# Patient Record
Sex: FEMALE | Race: WHITE | NOT HISPANIC OR LATINO | Employment: OTHER | URBAN - METROPOLITAN AREA
[De-identification: names, ages, dates, MRNs, and addresses within clinical notes are randomized per-mention and may not be internally consistent; named-entity substitution may affect disease eponyms.]

---

## 2018-03-13 LAB — INR PPP: 1.2 (ref 0.9–1.1)

## 2019-01-25 LAB — HEP C HIM: NORMAL

## 2019-03-14 LAB
CHOLEST SERPL-MCNC: 150 MG/DL
HBA1C MFR BLD: 4.8 % (ref 4–6)
HDLC SERPL-MCNC: 71 MG/DL (ref 23–92)
LDLC SERPL CALC-MCNC: 66 MG/DL
NONHDLC SERPL-MCNC: 79 MG/DL
TRIGL SERPL-MCNC: 65 MG/DL

## 2019-04-15 ENCOUNTER — TRANSFERRED RECORDS (OUTPATIENT)
Dept: HEALTH INFORMATION MANAGEMENT | Facility: CLINIC | Age: 44
End: 2019-04-15

## 2019-06-14 ENCOUNTER — TRANSFERRED RECORDS (OUTPATIENT)
Dept: HEALTH INFORMATION MANAGEMENT | Facility: CLINIC | Age: 44
End: 2019-06-14

## 2019-06-28 ENCOUNTER — TRANSFERRED RECORDS (OUTPATIENT)
Dept: HEALTH INFORMATION MANAGEMENT | Facility: CLINIC | Age: 44
End: 2019-06-28

## 2019-08-13 ENCOUNTER — TRANSFERRED RECORDS (OUTPATIENT)
Dept: HEALTH INFORMATION MANAGEMENT | Facility: CLINIC | Age: 44
End: 2019-08-13

## 2019-08-23 ENCOUNTER — MEDICAL CORRESPONDENCE (OUTPATIENT)
Dept: HEALTH INFORMATION MANAGEMENT | Facility: CLINIC | Age: 44
End: 2019-08-23

## 2019-08-30 NOTE — TELEPHONE ENCOUNTER
MEDICAL RECORDS REQUEST   [facility]  Urology Clinic  [address] [place] [address]  PHONE: [phone]  Fax: [phone]        FUTURE VISIT INFORMATION                                                   Marsha Mcneill YOB: 1975 scheduled for future visit at [facility]    APPOINTMENT INFORMATION:    Date: 19 12PM     Provider:  1975    Reason for Visit/Diagnosis: NGB     REFERRAL INFORMATION:    Referring provider:  Cruztio Pardo     Specialty: MD    Referring providers clinic: [facility] contact number:  [phone]    RECORDS REQUESTED FOR VISIT                                                     NOTES  STATUS/DETAILS   OFFICE NOTE from referring provider  yes   OFFICE NOTE from other specialist  mo   DISCHARGE SUMMARY from hospital  no   DISCHARGE REPORT from the ER  no   OPERATIVE REPORT  yes   MEDICATION LIST  yes   NEUROGENIC BLADDER      CT ABDOMEN/PELVIS (IMAGES & REPORT)  yes   CYSTOGRAM (IMAGES & REPORT)  yes   IVP (IMAGES & REPORT)  no   KUB (IMAGES & REPORT)  no   LABS  yes   RENAL/BLADDER ULTRASOUND (IMAGES & REPORT)  Yes      PRE-VISIT CHECKLIST      Record collection complete Yes- [facility] recs scanned in epic   Images in  PACS    If no, please explain: Called and LM for [facility] to fax over recs -   Amy called from [facility], will fax over orders.    Appointment appropriately scheduled           (right time/right provider) Yes   MyChart activation If no, please explain: In process    Questionnaire complete If no, please explain: In process      Completed by: Carie Barcenas

## 2019-09-11 ENCOUNTER — TRANSFERRED RECORDS (OUTPATIENT)
Dept: HEALTH INFORMATION MANAGEMENT | Facility: CLINIC | Age: 44
End: 2019-09-11

## 2019-09-12 ENCOUNTER — TRANSFERRED RECORDS (OUTPATIENT)
Dept: HEALTH INFORMATION MANAGEMENT | Facility: CLINIC | Age: 44
End: 2019-09-12

## 2019-09-18 ENCOUNTER — TRANSFERRED RECORDS (OUTPATIENT)
Dept: HEALTH INFORMATION MANAGEMENT | Facility: CLINIC | Age: 44
End: 2019-09-18

## 2019-09-24 ENCOUNTER — PRE VISIT (OUTPATIENT)
Dept: UROLOGY | Facility: CLINIC | Age: 44
End: 2019-09-24

## 2019-09-24 DIAGNOSIS — N31.9 NEUROGENIC BLADDER: Primary | ICD-10-CM

## 2019-09-24 DIAGNOSIS — G35 MS (MULTIPLE SCLEROSIS) (H): ICD-10-CM

## 2019-09-24 NOTE — TELEPHONE ENCOUNTER
Reason for visit: Neurogenic bladder     Relevant information: history of MS    Records/imaging/labs/orders: labs available, imaging orders placed    Pt called: message routed to scheduling team    At Rooming: regular

## 2019-09-25 ENCOUNTER — TRANSFERRED RECORDS (OUTPATIENT)
Dept: HEALTH INFORMATION MANAGEMENT | Facility: CLINIC | Age: 44
End: 2019-09-25

## 2019-09-26 ENCOUNTER — TRANSFERRED RECORDS (OUTPATIENT)
Dept: HEALTH INFORMATION MANAGEMENT | Facility: CLINIC | Age: 44
End: 2019-09-26

## 2019-09-29 ASSESSMENT — PATIENT HEALTH QUESTIONNAIRE - PHQ9: SUM OF ALL RESPONSES TO PHQ QUESTIONS 1-9: 16

## 2019-09-30 ENCOUNTER — PRE VISIT (OUTPATIENT)
Dept: UROLOGY | Facility: CLINIC | Age: 44
End: 2019-09-30

## 2019-11-04 ENCOUNTER — TELEPHONE (OUTPATIENT)
Dept: UROLOGY | Facility: CLINIC | Age: 44
End: 2019-11-04

## 2019-11-04 ENCOUNTER — PRE VISIT (OUTPATIENT)
Dept: UROLOGY | Facility: CLINIC | Age: 44
End: 2019-11-04

## 2019-11-08 NOTE — TELEPHONE ENCOUNTER
Health Call Center    Phone Message    May a detailed message be left on voicemail: yes    Reason for Call: Other: Patient called she said she cant get a medical cab do to being so far out. Patient want to know if we telemedicine or flash video call? Patient said appointment is with Pietro and she is trying to make it work. Patient said she live 2 1/2 hours away please call to discuss.     Action Taken: Other: p urology

## 2019-11-08 NOTE — TELEPHONE ENCOUNTER
Patient called and message left she needs to come in for the first visit face to face and get her renal ultrasound  Not able to do this on the phone but going forward there is a possibility we could do phone call Manisha Lloyd LPN Staff Nurse

## 2019-11-20 ENCOUNTER — PRE VISIT (OUTPATIENT)
Dept: UROLOGY | Facility: CLINIC | Age: 44
End: 2019-11-20

## 2019-11-26 ENCOUNTER — TRANSFERRED RECORDS (OUTPATIENT)
Dept: HEALTH INFORMATION MANAGEMENT | Facility: CLINIC | Age: 44
End: 2019-11-26

## 2019-12-02 ENCOUNTER — TELEPHONE (OUTPATIENT)
Dept: UROLOGY | Facility: CLINIC | Age: 44
End: 2019-12-02

## 2019-12-02 ENCOUNTER — OFFICE VISIT (OUTPATIENT)
Dept: UROLOGY | Facility: CLINIC | Age: 44
End: 2019-12-02
Payer: COMMERCIAL

## 2019-12-02 VITALS — DIASTOLIC BLOOD PRESSURE: 65 MMHG | SYSTOLIC BLOOD PRESSURE: 99 MMHG | RESPIRATION RATE: 16 BRPM | HEART RATE: 65 BPM

## 2019-12-02 DIAGNOSIS — N31.9 NEUROGENIC BLADDER: Primary | ICD-10-CM

## 2019-12-02 RX ORDER — DIAZEPAM 2 MG
2 TABLET ORAL 2 TIMES DAILY PRN
COMMUNITY
Start: 2018-12-21 | End: 2020-09-23

## 2019-12-02 RX ORDER — OXYCODONE HYDROCHLORIDE 5 MG/1
TABLET ORAL
Status: ON HOLD | COMMUNITY
Start: 2019-10-04 | End: 2020-02-28

## 2019-12-02 RX ORDER — SULFAMETHOXAZOLE/TRIMETHOPRIM 800-160 MG
1 TABLET ORAL 2 TIMES DAILY
Refills: 0 | Status: ON HOLD | COMMUNITY
Start: 2020-01-20 | End: 2020-02-28

## 2019-12-02 RX ORDER — ACETAMINOPHEN 500 MG
1000 TABLET ORAL 3 TIMES DAILY
COMMUNITY
End: 2023-02-14

## 2019-12-02 RX ORDER — SACCHAROMYCES BOULARDII 250 MG
CAPSULE ORAL
Refills: 4 | COMMUNITY
Start: 2019-11-04 | End: 2020-02-03

## 2019-12-02 RX ORDER — IBUPROFEN 800 MG/1
TABLET, FILM COATED ORAL
Refills: 99 | Status: ON HOLD | COMMUNITY
Start: 2019-11-15 | End: 2020-02-28

## 2019-12-02 RX ORDER — VANCOMYCIN HYDROCHLORIDE 1 G/20ML
1500 INJECTION, POWDER, LYOPHILIZED, FOR SOLUTION INTRAVENOUS
COMMUNITY
End: 2020-01-13

## 2019-12-02 RX ORDER — OXYBUTYNIN CHLORIDE 15 MG/1
TABLET, EXTENDED RELEASE ORAL
Refills: 99 | COMMUNITY
Start: 2019-11-13 | End: 2020-05-04

## 2019-12-02 RX ORDER — SODIUM CHLORIDE 0.9 % (FLUSH) 0.9 %
SYRINGE (ML) INJECTION
Refills: 99 | COMMUNITY
Start: 2019-11-15 | End: 2020-02-03

## 2019-12-02 RX ORDER — ONDANSETRON 4 MG/1
TABLET, FILM COATED ORAL
Refills: 99 | COMMUNITY
Start: 2019-09-23 | End: 2020-04-23

## 2019-12-02 RX ORDER — FLUCONAZOLE 100 MG/1
TABLET ORAL
Refills: 0 | COMMUNITY
Start: 2019-11-01 | End: 2020-01-13

## 2019-12-02 RX ORDER — TRAZODONE HYDROCHLORIDE 100 MG/1
TABLET ORAL
Refills: 99 | COMMUNITY
Start: 2019-11-13

## 2019-12-02 RX ORDER — DOXEPIN HYDROCHLORIDE 25 MG/1
CAPSULE ORAL
Refills: 99 | COMMUNITY
Start: 2019-11-13 | End: 2023-02-14

## 2019-12-02 RX ORDER — GABAPENTIN 300 MG/1
300 CAPSULE ORAL
COMMUNITY
Start: 2018-12-21 | End: 2020-02-03

## 2019-12-02 ASSESSMENT — ENCOUNTER SYMPTOMS
SKIN CHANGES: 0
WEAKNESS: 1
DECREASED APPETITE: 0
BACK PAIN: 0
DISTURBANCES IN COORDINATION: 1
PARALYSIS: 1
DYSURIA: 0
DIZZINESS: 1
SEIZURES: 0
POLYDIPSIA: 0
MUSCLE CRAMPS: 0
NECK PAIN: 0
MEMORY LOSS: 0
ARTHRALGIAS: 1
JOINT SWELLING: 0
WEIGHT GAIN: 1
DIFFICULTY URINATING: 1
HEADACHES: 0
MYALGIAS: 1
ALTERED TEMPERATURE REGULATION: 0
NAIL CHANGES: 0
NUMBNESS: 0
CHILLS: 0
INCREASED ENERGY: 1
POLYPHAGIA: 0
WEIGHT LOSS: 0
FATIGUE: 1
STIFFNESS: 1
FEVER: 0
TREMORS: 0
SPEECH CHANGE: 0
LOSS OF CONSCIOUSNESS: 0
HALLUCINATIONS: 0
NIGHT SWEATS: 0
POOR WOUND HEALING: 1
TINGLING: 0
HEMATURIA: 1
FLANK PAIN: 0

## 2019-12-02 ASSESSMENT — PATIENT HEALTH QUESTIONNAIRE - PHQ9
SUM OF ALL RESPONSES TO PHQ QUESTIONS 1-9: 16
10. IF YOU CHECKED OFF ANY PROBLEMS, HOW DIFFICULT HAVE THESE PROBLEMS MADE IT FOR YOU TO DO YOUR WORK, TAKE CARE OF THINGS AT HOME, OR GET ALONG WITH OTHER PEOPLE: EXTREMELY DIFFICULT

## 2019-12-02 NOTE — PROGRESS NOTES
Urology Clinic    Errol Westbrook MD  Date of Service: 2019     Name: Marsha Mcneill  MRN: [id number]  Age: 44 year old  : 1975  Referring provider: Referred Self     Assessment and Plan:  Assessment:  Marsha Mcneill  is a 44 year old female with hx of progressive MS, NGB, and neurogenic bowel. She is s/p ileocystoplasty with Erik catheterizable channel in Dec 2012 with Dr. Westbrook, and diverting ileostomy at [facility] in 2018. She presents for increasing difficulty catheterizing the Erik with incontinence per urethra and Erik, as well as recurrent UTI's. 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 Catheterization today noted needing to turn the catheter due to resistance, probably due to the mass effect of the tumor. We will refer to gyn oncology re the ovarian mass, and perfom cystoscopy to assess her Erik and bladder and rule out tumor invasion. She is to continue her catheterization per Erik and increase the volume of irrigation. We will also obtain a UDS to assess for any changes since her last UDS was back in , prior to the augment.     Plan:  - Refer to gyn oncology re the ovarian tumor  - RTC for endoscopy of the erik channel and cystoscopy as well, UDS  - Will try to coordinate the above appointments/workup to be done at the same time given her travel issues  - C/w current catheterization regimen, but increase the volume used for daily bladder/augment irrigation to 240 (four 60 ml syringes) per Erik. Put 2 syringes in, aspirate one out. Then put another in and aspirate out. Then pun in a fourth and aspirate all out.   Follow up: RTC for UDS and cystoscopy as above  =======================================================  As the attending surgeon I, Errol Westbrook, interviewed and examined the patient. The plan was developed between me and the patient. My findings and plan are as stated by the resident.    Errol Westbrook,  MD      ---------------------------------------------------------------------------------------------------------------------    Chief Complaint:   Neurogenic bladder     HPI:   Marsha Mcneill  is a 44 year old female with a history of neurogenic bladder secondary to progressive Multiple Sclerosis.  Patient is wheelchair bound. She has a hx of NGB (small capacity and DOA on UDS) s/p ileocystoplasty with Erik catheterizable channel in Dec 2012 with Dr. Westbrook. She was last seen for f/u in Jan 2013 but was lost to f/u afterwards. She presents with her mother today.     She returns with progressive issues with catheterization (done by her nurse at her NH) over the past year and leakage per urethra and stoma, and recurrent UTI's - the UTI's p/w cloudy/foul urine, with occasional fevers/chills - she was last treated for a UTI 2 weeks ago with a 7 day course of bactrim. She remains on oxybutinin 15 mg ER qday. She has never had bladder botox but is getting it for muscle spasms later this month.     Of note, she also had a recent diagnosis of left sacral decubitus ulcer s/p I&D, wound vac, and now WTD dressing changes with plan for flap with plastics in the near future. An MRI done to evaluate this noted a large ovarian dermoid tumor, sitting against the posterior bladder wall. A follow-up MUNIRA noted a large mass approximating the posterior bladder wall.     Her MS is stable - she recently received rituximab infusions -- last 6m ago         Previous Bladder Surgeries:  Previous Bladder Augmentation: ileocecal cystoplasty in 12/2012  Catheterizable stoma: erik  Anti-incontinence procedures: none  Botox injections: None    Pertinent Medications:  Current Anticholinergics: oxybtinin 15 mg ER  Current Prophylactic antibiotics: None    Catheterization History:  She is catheterized with nursing assistance using a 14F straight catheter, QID, until the last few days.   They perform irrigation via Erik using 40 ml, once  daily    Incontinence History:  She does leak between voids/caths. She does not consistently experience urgency of urination. She does not experience stress urinary incontinence     Urinary Tract Infection History:  Treated with antibiotics for positive culture and non-specific symptoms: 4 times in the last year, and always treated with bactrim based on sensitivities  Treated with antibiotics for positive culture plus symptoms of UTI: 4 times in the last year    Bowel Movement History:  The patient has an ileostomy for neurogenic bowel - done in June 2018 at [facility].    Review of Systems:   Pertinent items are noted in HPI or as below, remainder of complete ROS is negative.      Active Medications:     Current Outpatient Medications:      baclofen (LIORESAL) 10 MG tablet, Take 20 mg by mouth daily., Disp: , Rfl:      BusPIRone HCl 30 MG TABS, Take 30 mg by mouth 2 times daily., Disp: , Rfl:      cephALEXin (KEFLEX) 250 MG capsule, Take 1 capsule by mouth every 6 hours., Disp: 12 capsule, Rfl: 0     Cholecalciferol (VITAMIN D3), Take 5,000 Units by mouth daily., Disp: , Rfl:      citalopram (CELEXA) 40 MG tablet, Take 40 mg by mouth daily., Disp: , Rfl:      HYDROcodone-acetaminophen 5-325 MG per tablet, Take 1-2 tablets by mouth every 4 hours as needed., Disp: 40 tablet, Rfl: 0     medroxyPROGESTERone (DEPO-PROVERA) 150 MG/ML injection, Inject 150 mg into the muscle every 3 months., Disp: , Rfl:      miconazole (MICATIN; MICRO GUARD) 2 % powder, Apply  topically 2 times daily., Disp: 70 g, Rfl: 0     oxybutynin (DITROPAN) 5 MG tablet, Take 3 tablets by mouth 2 times daily., Disp: 270 tablet, Rfl: 3      Allergies:   Amoxicillin; Bactrim [sulfamethoxazole w-trimethoprim]; Codeine sulfate; and Macrobid [nitrofurantoin anhydrous]      Past Medical History:  Multiple sclerosis   UTI   Neurogenic bladder   DVT   Depression   Anxiety     Past Surgical History:  Diversion Ileostomy at [facility] in 2018  Benji-catheter for  "infusions  Bladder augmentation, appendectomy, erik bladder stoma 12/7/2012   Insert baclofen pump 2009   Exploratory laparotomy, removal of inter abdominal drain 12/20/2012     Family History:   Mother: cancer, diabetes   Maternal grandfather: diabetes       Social History:   No tobacco use.   No alcohol use.      Physical Exam:   B/P: Data Unavailable, T: Data Unavailable, P: Data Unavailable, R: Data Unavailable, Data Unavailable, 0 lbs 0 oz,   Estimated body mass index is 33.67 kg/m  as calculated from the following:    Height as of 1/7/13: 1.702 m (5' 7\").    Weight as of 1/7/13: 97.5 kg (215 lb).  General: age-appropriate appearing female in NAD laying on gurney   HEENT: Head AT/NC, EOMI, CN Grossly intact.  Resp: no respiratory distress  Abdomen: Degree of obesity is mild. Abdomen is soft and nontender. No organomegaly. Surgical scars include midline incision, well healed. Healthy appearing erik stoma. Ileostomy with opaque bag, with air and stool.  Erik stoma catheterized with a straight tip 14F catheter, no stenosis appreciated but some resistance is met at 10+ cm deep, requiring turning the catheter     : deferred  LE: Edema is none   Neuro: Absent in lower limbs  Motor: Atrophy and weakness in upper limbs  Skin: clear of rashes or ecchymoses. No sacral decubitus ulcer.     Imaging:  MUNIRA was done at OSH 11/26/2019  RIGHT KIDNEY: 11.2 cm. Normal without hydronephrosis or masses.   LEFT KIDNEY: 10.7 cm. Normal without hydronephrosis or masses.     BLADDER: There is a large mass approximating the posterior bladder   wall, measuring 5.7 x 4.8 x 4.0 cm.    IMPRESSION:  1.  The kidneys appear unremarkable.  2.  Large bladder mass. Urology consultation recommended.    MRI was done in Sep 2019 and noted a large dermoid tumor:  - Deep left sacral decubitus ulcer around ischial tuberosity  - large fat containing right ovarian dermoid        Seen with Dr. Pietro Augustine,   Urology Resident       "

## 2019-12-02 NOTE — PATIENT INSTRUCTIONS
Please schedule next available Urodynamics and cystoscopy with Dr. Westbrook with the cystoscopy after the Urodynamics.  Referral to Gyn/Onc.      It was a pleasure meeting with you today.  Thank you for allowing me and my team the privilege of caring for you today.  YOU are the reason we are here, and I truly hope we provided you with the excellent service you deserve.  Please let us know if there is anything else we can do for you so that we can be sure you are leaving completely satisfied with your care experience.

## 2019-12-02 NOTE — TELEPHONE ENCOUNTER
ONCOLOGY INTAKE: Records Information      APPT INFORMATION: [id number] Dr. Tommie Barron  Referring provider:  Dr. Errol Hernandez  Referring provider s clinic:  [facility]  Reason for visit/diagnosis:  Ovarian mass  Has patient been notified of appointment date and time?: Yes    RECORDS INFORMATION:  Were the records received with the referral (via [employer])? Internal Referral    Has patient been seen for any external appt for this diagnosis? No    If yes, where? NA    Has patient had any imaging or procedures outside of [facility] for this condition? NO      If Yes, where? NA    ADDITIONAL INFORMATION:  None

## 2019-12-02 NOTE — TELEPHONE ENCOUNTER
[facility] Call Center    Phone Message    May a detailed message be left on voicemail: yes    Reason for Call: Other: Contacted Pt and informed her we could offer her a 10AM appt if she could make it.  Pt said that she was being delivered by ambulance and the drive said they could make if by that time if they didn't run into any further traffic complications.  Pt will attempt to be at the 10 AM Appt.     Action Taken: Message routed to:  [facility] urology

## 2019-12-02 NOTE — TELEPHONE ENCOUNTER
Audie from the adult call center states that the patient is stuck on a block of ice in [place]. Audie moved her appointment to 10:00 am. Audie from the Adult call center was notified that the patient needs to reschedule.       Betty Dutton MA

## 2019-12-02 NOTE — LETTER
2019       RE: Marsha Mcneill  [address]  [facility] [address]     Dear Colleague,    Thank you for referring your patient, Marsha Mcneill, to the [facility] UROLOGY AND INST FOR PROSTATE AND UROLOGIC CANCERS at [facility]. Please see a copy of my visit note below.      Urology Clinic    Errol Westbrook MD  Date of Service: 2019     Name: Marsha Mcneill  MRN: [id number]  Age: 44 year old  : 1975  Referring provider: Referred Self     Assessment and Plan:  Assessment:  Marsha Mcneill  is a 44 year old female with hx of progressive MS, NGB, and neurogenic bowel. She is s/p ileocystoplasty with Erik catheterizable channel in Dec 2012 with Dr. Westbrook, and diverting ileostomy at [facility] in 2018. She presents for increasing difficulty catheterizing the Erik with incontinence per urethra and Erik, as well as recurrent UTI's. 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 Catheterization today noted needing to turn the catheter due to resistance, probably due to the mass effect of the tumor. We will refer to gyn oncology re the ovarian mass, and perfom cystoscopy to assess her Erik and bladder and rule out tumor invasion. She is to continue her catheterization per Erik and increase the volume of irrigation. We will also obtain a UDS to assess for any changes since her last UDS was back in , prior to the augment.     Plan:  - Refer to gyn oncology re the ovarian tumor  - RTC for endoscopy of the erik channel and cystoscopy as well, UDS  - Will try to coordinate the above appointments/workup to be done at the same time given her travel issues  - C/w current catheterization regimen, but increase the volume used for daily bladder/augment irrigation to 240 (four 60 ml syringes) per Erik. Put 2 syringes in, aspirate one out. Then put another in and aspirate out. Then pun in a fourth and aspirate all out.   Follow up:  RTC for UDS and cystoscopy as above  =======================================================  As the attending surgeon I, Errol Westbrook, interviewed and examined the patient. The plan was developed between me and the patient. My findings and plan are as stated by the resident.    Errol Westbrook MD      ---------------------------------------------------------------------------------------------------------------------    Chief Complaint:   Neurogenic bladder     HPI:   Marsha Mcneill  is a 44 year old female with a history of neurogenic bladder secondary to progressive Multiple Sclerosis.  Patient is wheelchair bound. She has a hx of NGB (small capacity and DOA on UDS) s/p ileocystoplasty with Erik catheterizable channel in Dec 2012 with Dr. Westbrook. She was last seen for f/u in Jan 2013 but was lost to f/u afterwards. She presents with her mother today.     She returns with progressive issues with catheterization (done by her nurse at her NH) over the past year and leakage per urethra and stoma, and recurrent UTI's - the UTI's p/w cloudy/foul urine, with occasional fevers/chills - she was last treated for a UTI 2 weeks ago with a 7 day course of bactrim. She remains on oxybutinin 15 mg ER qday. She has never had bladder botox but is getting it for muscle spasms later this month.     Of note, she also had a recent diagnosis of left sacral decubitus ulcer s/p I&D, wound vac, and now WTD dressing changes with plan for flap with plastics in the near future. An MRI done to evaluate this noted a large ovarian dermoid tumor, sitting against the posterior bladder wall. A follow-up MUNIRA noted a large mass approximating the posterior bladder wall.     Her MS is stable - she recently received rituximab infusions -- last 6m ago         Previous Bladder Surgeries:  Previous Bladder Augmentation: ileocecal cystoplasty in 12/2012  Catheterizable stoma: erik  Anti-incontinence procedures: none  Botox injections:  None    Pertinent Medications:  Current Anticholinergics: oxybtinin 15 mg ER  Current Prophylactic antibiotics: None    Catheterization History:  She is catheterized with nursing assistance using a 14F straight catheter, QID, until the last few days.   They perform irrigation via Erik using 40 ml, once daily    Incontinence History:  She does leak between voids/caths. She does not consistently experience urgency of urination. She does not experience stress urinary incontinence     Urinary Tract Infection History:  Treated with antibiotics for positive culture and non-specific symptoms: 4 times in the last year, and always treated with bactrim based on sensitivities  Treated with antibiotics for positive culture plus symptoms of UTI: 4 times in the last year    Bowel Movement History:  The patient has an ileostomy for neurogenic bowel - done in June 2018 at [facility].    Review of Systems:   Pertinent items are noted in HPI or as below, remainder of complete ROS is negative.      Active Medications:     Current Outpatient Medications:      baclofen (LIORESAL) 10 MG tablet, Take 20 mg by mouth daily., Disp: , Rfl:      BusPIRone HCl 30 MG TABS, Take 30 mg by mouth 2 times daily., Disp: , Rfl:      cephALEXin (KEFLEX) 250 MG capsule, Take 1 capsule by mouth every 6 hours., Disp: 12 capsule, Rfl: 0     Cholecalciferol (VITAMIN D3), Take 5,000 Units by mouth daily., Disp: , Rfl:      citalopram (CELEXA) 40 MG tablet, Take 40 mg by mouth daily., Disp: , Rfl:      HYDROcodone-acetaminophen 5-325 MG per tablet, Take 1-2 tablets by mouth every 4 hours as needed., Disp: 40 tablet, Rfl: 0     medroxyPROGESTERone (DEPO-PROVERA) 150 MG/ML injection, Inject 150 mg into the muscle every 3 months., Disp: , Rfl:      miconazole (MICATIN; MICRO GUARD) 2 % powder, Apply  topically 2 times daily., Disp: 70 g, Rfl: 0     oxybutynin (DITROPAN) 5 MG tablet, Take 3 tablets by mouth 2 times daily., Disp: 270 tablet, Rfl: 3      Allergies:  "  Amoxicillin; Bactrim [sulfamethoxazole w-trimethoprim]; Codeine sulfate; and Macrobid [nitrofurantoin anhydrous]      Past Medical History:  Multiple sclerosis   UTI   Neurogenic bladder   DVT   Depression   Anxiety     Past Surgical History:  Diversion Ileostomy at [facility] in 2018  Benji-catheter for infusions  Bladder augmentation, appendectomy, erik bladder stoma 12/7/2012   Insert baclofen pump 2009   Exploratory laparotomy, removal of inter abdominal drain 12/20/2012     Family History:   Mother: cancer, diabetes   Maternal grandfather: diabetes       Social History:   No tobacco use.   No alcohol use.      Physical Exam:   B/P: Data Unavailable, T: Data Unavailable, P: Data Unavailable, R: Data Unavailable, Data Unavailable, 0 lbs 0 oz,   Estimated body mass index is 33.67 kg/m  as calculated from the following:    Height as of 1/7/13: 1.702 m (5' 7\").    Weight as of 1/7/13: 97.5 kg (215 lb).  General: age-appropriate appearing female in NAD laying on gurney   HEENT: Head AT/NC, EOMI, CN Grossly intact.  Resp: no respiratory distress  Abdomen: Degree of obesity is mild. Abdomen is soft and nontender. No organomegaly. Surgical scars include midline incision, well healed. Healthy appearing erik stoma. Ileostomy with opaque bag, with air and stool.  Erik stoma catheterized with a straight tip 14F catheter, no stenosis appreciated but some resistance is met at 10+ cm deep, requiring turning the catheter     : deferred  LE: Edema is none   Neuro: Absent in lower limbs  Motor: Atrophy and weakness in upper limbs  Skin: clear of rashes or ecchymoses. No sacral decubitus ulcer.     Imaging:  MUNIRA was done at OSH 11/26/2019  RIGHT KIDNEY: 11.2 cm. Normal without hydronephrosis or masses.   LEFT KIDNEY: 10.7 cm. Normal without hydronephrosis or masses.     BLADDER: There is a large mass approximating the posterior bladder   wall, measuring 5.7 x 4.8 x 4.0 cm.    IMPRESSION:  1.  The kidneys appear unremarkable.  2.  " Large bladder mass. Urology consultation recommended.    MRI was done in Sep 2019 and noted a large dermoid tumor:  - Deep left sacral decubitus ulcer around ischial tuberosity  - large fat containing right ovarian dermoid        Seen with Dr. Pietro Augustine,   Urology Resident         Again, thank you for allowing me to participate in the care of your patient.      Sincerely,    Errol Westbrook MD

## 2019-12-03 ASSESSMENT — PATIENT HEALTH QUESTIONNAIRE - PHQ9: SUM OF ALL RESPONSES TO PHQ QUESTIONS 1-9: 16

## 2019-12-03 NOTE — TELEPHONE ENCOUNTER
RECORDS STATUS - ALL OTHER DIAGNOSIS      Action    Action Taken December 3, 2019  Call to PT and she verified that there are no other outside records     RECORDS RECEIVED FROM: Internal Referral   DATE RECEIVED: in epic   NOTES STATUS DETAILS   OFFICE NOTE from referring provider     OFFICE NOTE from medical oncologist     DISCHARGE SUMMARY from hospital     DISCHARGE REPORT from the ER     OPERATIVE REPORT     MEDICATION LIST     CLINICAL TRIAL TREATMENTS TO DATE     LABS     PATHOLOGY REPORTS     ANYTHING RELATED TO DIAGNOSIS     GENONOMIC TESTING     TYPE:     IMAGING (NEED IMAGES & REPORT)     CT SCANS     MRI     MAMMO     ULTRASOUND     PET     '

## 2019-12-05 LAB
ALT SERPL-CCNC: 23 U/L (ref 7–52)
AST SERPL-CCNC: 11 U/L (ref 13–39)

## 2019-12-12 ENCOUNTER — MEDICAL CORRESPONDENCE (OUTPATIENT)
Dept: HEALTH INFORMATION MANAGEMENT | Facility: CLINIC | Age: 44
End: 2019-12-12

## 2019-12-27 ENCOUNTER — PRE VISIT (OUTPATIENT)
Dept: UROLOGY | Facility: CLINIC | Age: 44
End: 2019-12-27

## 2019-12-27 DIAGNOSIS — N31.9 NEUROGENIC BLADDER: Primary | ICD-10-CM

## 2019-12-27 NOTE — TELEPHONE ENCOUNTER
Reason for visit: cystoscopy and review UDS     Relevant information: Neurogenic bladder, pt is wheelchair bound    Records/imaging/labs/orders: all appointments scheduled    Pt called: yes, message left asking pt to have sling in place    At Rooming: regular

## 2019-12-28 NOTE — TELEPHONE ENCOUNTER
Chief Complaint : UDS-Dr. Westbrook    Hx/Sx: NGB    Records/Orders: UA/UC Requested     Pt Contacted: Khushi on 12/27; called on 1/6/20 and spoke to Marsha's Nurse Kasia and told her we need a UA/UC for the upcoming appointment; fax number given for results.    At Rooming: [place]

## 2020-01-06 ENCOUNTER — TRANSFERRED RECORDS (OUTPATIENT)
Dept: HEALTH INFORMATION MANAGEMENT | Facility: CLINIC | Age: 45
End: 2020-01-06

## 2020-01-07 LAB
CREAT SERPL-MCNC: 0.4 MG/DL (ref 0.7–1.3)
GFR SERPL CREATININE-BSD FRML MDRD: >60 ML/MIN/1.73M2
GLUCOSE SERPL-MCNC: 81 MG/DL (ref 70–105)
POTASSIUM SERPL-SCNC: 3.9 MMOL/L (ref 3.5–5.1)

## 2020-01-09 ENCOUNTER — TELEPHONE (OUTPATIENT)
Dept: UROLOGY | Facility: CLINIC | Age: 45
End: 2020-01-09

## 2020-01-09 NOTE — TELEPHONE ENCOUNTER
[facility] Call Center    Phone Message    May a detailed message be left on voicemail: yes    Reason for Call: Other:     Pt calling in about her UA she did yesterday.  Her pcp saw the results and prescribed her some meds.     Marsha wants to make sure that this is the same solution that Dr Hernandez would like to take.     Please call back to discuss.       Action Taken: Message routed to:  [facility] Urology

## 2020-01-10 NOTE — TELEPHONE ENCOUNTER
Patient notified that the Bactrim prescription should treat her UTI done at her PCP clinic. Patient agreed with the plan.      Betty Dutton MA

## 2020-01-10 NOTE — TELEPHONE ENCOUNTER
[facility] Call Center    Phone Message    May a detailed message be left on voicemail: yes    Reason for Call: Other: Patient said her primary prescribed BACTRUIM she wanted to inform Manisha of that.     Action Taken: Other: ump Urology     No, not prescribed...

## 2020-01-12 NOTE — PROGRESS NOTES
Consult Notes on Referred Patient    Date: 2020       Dr. Errol Westbrook MD  [address] [facility] [address]  [place] [address]       RE: Marsha Mcneill  : 1975  VINI: 2020    Dear Dr. Errol Westbrook:    I had the pleasure of seeing your patient Marsha Mcneill here at the Gynecologic Cancer Clinic at the [facility] on 2020.  As you know she is a very pleasant 44 year old woman with progressive  a recent diagnosis of pelvic mass causing obstructive symptoms to her urinary diversion.  Given these findings she was subsequently sent to the Gynecologic Cancer Clinic for new patient consultation.     HPI - She began noting some difficulty with catheterization months ago, but reports it has been a slow and steady change.  Ultimately this became a more acute issue, for which she sought attention.  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  She is doing well and can currently catheterize with some difficulty.    She has a long standing h/o MS for which she has an appointment next week.  This has been progressive and though she is greatly restricted in her motion, she does have sensation.    She has been out of routine health care screening for some time and has had neither Pap or Mammogram to date.      Review of Systems:    Systemic           no weight changes; no fever; no chills; no night sweats; no appetite changes  Skin           no rashes, or lesions  Eye           no irritation; no changes in vision  Jenaro-Laryngeal           no dysphagia; no hoarseness   Pulmonary    no cough; no shortness of breath  Cardiovascular    no chest pain; no palpitations  Gastrointestinal    no diarrhea; no constipation; no abdominal pain; no changes in bowel  habits; no blood in stool  Genitourinary   no urinary frequency; no urinary urgency; no dysuria; no pain; no abnormal vaginal  discharge; no abnormal vaginal bleeding  Breast   no breast discharge; no breast changes; no breast pain  Musculoskeletal    no myalgias; no arthralgias; no back pain  Psychiatric           no depressed mood; no anxiety    Hematologic           no tender lymph nodes; no noticeable swellings or lumps   Endocrine    no hot flashes; no heat/cold intolerance         Neurological   no tremor; no numbness and tingling; no headaches; no difficulty  sleeping      Past Medical History:    Past Medical History:   Diagnosis Date     Multiple scleroses      Neurological disorders      Urinary tract infection          Past Surgical History:    Past Surgical History:   Procedure Laterality Date     BLADDER AUGMENTATION  12/7/2012    Procedure: BLADDER AUGMENTATION;  APPENDECTOMY, BLADDER AUGMENTATION, HUMBERTO BLADDER STOMA;  Surgeon: Errol Westbrook MD;  Location: [facility] OR     INSERT PUMP BACLOFEN  2009     LAPAROTOMY EXPLORATORY  12/20/2012    Procedure: LAPAROTOMY EXPLORATORY;  Removal of Inter Abdominal Drain;  Surgeon: Errol Westbrook MD;  Location: [facility] OR     MITROFANOFF PROCEDURE (APPENDIX CONDUIT)  12/7/2012    Procedure: MITROFANOFF PROCEDURE (APPENDIX CONDUIT);;  Surgeon: Errol Westbrook MD;  Location: [facility] OR         Health Maintenance:  Health Maintenance Due   Topic Date Due     PREVENTIVE CARE VISIT  1975     DEPRESSION ACTION PLAN  1975     DTAP/TDAP/TD IMMUNIZATION (1 - Tdap) 07/06/1987     HIV SCREENING  07/06/1991     HPV  07/06/1997     PAP  07/06/2001     INFLUENZA VACCINE (1) 09/01/2019       Last Pap Smear: No recent ones, no history of abnormal paps per patient    Last Mammogram: none    Last Colonoscopy: none                       Current Medications:     has a current medication list which includes the following prescription(s): acetaminophen, baclofen, buspirone hcl, calcium carbonate-vitamin d3, cephalexin, cholecalciferol, citalopram, diazepam, doxepin, florastor, fluconazole,  fluoxetine, gabapentin, heparin, hydrocodone-acetaminophen, ibuprofen, medroxyprogesterone, miconazole, normal saline flush, nutritional supplements, ondansetron, oxybutynin, oxybutynin er, oxycodone, sulfamethoxazole-trimethoprim, trazodone, and vancomycin.       Allergies:        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     Bactrim [Sulfamethoxazole W-Trimethoprim] Nausea     Macrobid [Nitrofurantoin Anhydrous] Nausea and Vomiting             Social History:     Social History     Tobacco Use     Smoking status: Never Smoker     Smokeless tobacco: Never Used   Substance Use Topics     Alcohol use: No       History   Drug Use No           Family History:     The patient's family history is notable for .    Family History   Problem Relation Age of Onset     Cancer Mother      Diabetes Mother      Diabetes Maternal Grandfather          Physical Exam:     PS 3  VS: BP 91/58   Pulse 86   Temp 98.1  F (36.7  C) (Oral)   Resp 14   SpO2 98%      General Appearance: healthy and alert, no distress     HEENT:  no thyromegaly, no palpable nodules or masses        Cardiovascular: regular rate and rhythm, no gallops, rubs or murmurs     Respiratory: lungs clear, no rales, rhonchi or wheezes, normal diaphragmatic excursion    Musculoskeletal: extremities non tender and without edema    Skin: no lesions or rashes     Neurological: normal gait, no gross defects     Psychiatric: appropriate mood and affect                               Hematological: normal cervical, supraclavicular and inguinal lymph nodes     Gastrointestinal:       abdomen soft, non-tender, non-distended, no organomegaly or masses    Genitourinary: External genitalia and urethral meatus appears normal.  Vagina is smooth without  nodularity or masses.  Cervix appears normal and without lesions.  Bimanual exam reveals fullness, but I can not appreciate a salvatore mass.  Recto-vaginal exam confirms these findings.  There is no peritoneal studding    Breast - no dominant masses, no axillary masses, no expression from the nipples.      Assessment:    Marsha Mcneill is a 44 year old woman with a new diagnosis of pelvic mass.     A total of 60 minutes was spent with the patient, 40 minutes of which were spent in counseling the patient and/or treatment planning.      Plan:     1.)   Pelvic mass: We have discussed the clinical and radiologic findings.  I have instructed the patient that malignancy is not excluded, though felt to be less likely.  We have discussed options and she is prepared for a laparotomy and resection of pelvic mass with possible open surgery or debulking in the setting of malignancy.  We have discussed laparoscopy as well - but her multiple previous surgeries, ostomies, and implanted device (baclofen pump) make this a higher risk circumstance.  As such I think she would be best served by an infra-umbilical midline laparotomy.  Will begin the pre-operative processes today.    I have recommended resection of the mass and both fallopian tubes.  She would like to keep the other ovary and is having problems with osteopenia already, but is prepared for excision in the setting of cancer       2.) Genetic risk factors were assessed and the patient does not meet the qualifications for a referral.      3.) Labs and/or tests ordered include:  Pre-op labs, will go to PAC .     4.) Health maintenance issues addressed today include: pap done, breast examination done.    5.) Pre-op teaching was completed today.  Risks of surgery were discussed to include: bleeding, transfusion, infection, unintentional injury to surrounding organs/structures.      Thank you for allowing us to participate in the care of your patient.          Sincerely,      CC  Patient Care Team:  Maddi Mohr, JUMANA as PCP - General  Cruzito Pardo (Family Practice)  Errol Westbrook MD as MD (Urology)  ERROL WESTBROOK

## 2020-01-13 ENCOUNTER — OFFICE VISIT (OUTPATIENT)
Dept: UROLOGY | Facility: CLINIC | Age: 45
End: 2020-01-13
Payer: COMMERCIAL

## 2020-01-13 ENCOUNTER — ONCOLOGY VISIT (OUTPATIENT)
Dept: ONCOLOGY | Facility: CLINIC | Age: 45
End: 2020-01-13
Attending: UROLOGY
Payer: COMMERCIAL

## 2020-01-13 ENCOUNTER — PRE VISIT (OUTPATIENT)
Dept: ONCOLOGY | Facility: CLINIC | Age: 45
End: 2020-01-13

## 2020-01-13 ENCOUNTER — ANCILLARY PROCEDURE (OUTPATIENT)
Dept: RADIOLOGY | Facility: AMBULATORY SURGERY CENTER | Age: 45
End: 2020-01-13
Attending: NURSE PRACTITIONER
Payer: COMMERCIAL

## 2020-01-13 VITALS
HEIGHT: 67 IN | DIASTOLIC BLOOD PRESSURE: 68 MMHG | HEART RATE: 76 BPM | WEIGHT: 161 LBS | SYSTOLIC BLOOD PRESSURE: 100 MMHG | BODY MASS INDEX: 25.27 KG/M2

## 2020-01-13 VITALS
OXYGEN SATURATION: 98 % | RESPIRATION RATE: 14 BRPM | TEMPERATURE: 98.1 F | DIASTOLIC BLOOD PRESSURE: 58 MMHG | HEART RATE: 86 BPM | SYSTOLIC BLOOD PRESSURE: 91 MMHG

## 2020-01-13 VITALS
HEIGHT: 67 IN | WEIGHT: 161 LBS | HEART RATE: 76 BPM | DIASTOLIC BLOOD PRESSURE: 68 MMHG | BODY MASS INDEX: 25.27 KG/M2 | SYSTOLIC BLOOD PRESSURE: 100 MMHG

## 2020-01-13 DIAGNOSIS — N83.8 OVARIAN MASS: Primary | ICD-10-CM

## 2020-01-13 DIAGNOSIS — N31.9 NEUROGENIC BLADDER: Primary | ICD-10-CM

## 2020-01-13 DIAGNOSIS — G35 MS (MULTIPLE SCLEROSIS) (H): ICD-10-CM

## 2020-01-13 DIAGNOSIS — I82.409 DVT (DEEP VENOUS THROMBOSIS) (H): ICD-10-CM

## 2020-01-13 DIAGNOSIS — N31.9 NEUROGENIC BLADDER: ICD-10-CM

## 2020-01-13 PROBLEM — I87.8 POOR VENOUS ACCESS: Status: ACTIVE | Noted: 2019-05-30

## 2020-01-13 PROBLEM — K59.00 CONSTIPATION: Status: ACTIVE | Noted: 2018-03-15

## 2020-01-13 PROBLEM — L60.1 ONYCHOLYSIS OF TOENAIL: Status: ACTIVE | Noted: 2018-10-04

## 2020-01-13 PROBLEM — R60.0 BILATERAL LOWER EXTREMITY EDEMA: Status: ACTIVE | Noted: 2018-03-12

## 2020-01-13 PROBLEM — Z93.2 ILEOSTOMY IN PLACE (H): Status: ACTIVE | Noted: 2019-11-01

## 2020-01-13 PROBLEM — R53.82 CHRONIC FATIGUE: Status: ACTIVE | Noted: 2019-03-20

## 2020-01-13 PROBLEM — B37.31 VAGINAL CANDIDIASIS: Status: ACTIVE | Noted: 2019-02-27

## 2020-01-13 PROBLEM — N39.0 RECURRENT UTI: Status: ACTIVE | Noted: 2019-12-12

## 2020-01-13 PROBLEM — T83.511A URINARY TRACT INFECTION ASSOCIATED WITH CATHETERIZATION OF URINARY TRACT (H): Status: ACTIVE | Noted: 2018-03-12

## 2020-01-13 PROBLEM — G89.4 CHRONIC PAIN DISORDER: Status: ACTIVE | Noted: 2019-11-01

## 2020-01-13 PROBLEM — M86.9 OSTEOMYELITIS (H): Status: ACTIVE | Noted: 2019-10-15

## 2020-01-13 PROBLEM — G47.9 SLEEP DIFFICULTIES: Status: ACTIVE | Noted: 2018-04-10

## 2020-01-13 PROBLEM — N39.0 URINARY TRACT INFECTION ASSOCIATED WITH CATHETERIZATION OF URINARY TRACT (H): Status: ACTIVE | Noted: 2018-03-12

## 2020-01-13 PROBLEM — R25.2 SPASTICITY: Status: ACTIVE | Noted: 2018-04-02

## 2020-01-13 PROBLEM — K56.7 ILEUS (H): Status: ACTIVE | Noted: 2018-03-12

## 2020-01-13 PROBLEM — Z87.440 HISTORY OF RECURRENT UTI (URINARY TRACT INFECTION): Status: ACTIVE | Noted: 2018-10-04

## 2020-01-13 PROBLEM — D70.9 NEUTROPENIA (H): Status: ACTIVE | Noted: 2018-03-14

## 2020-01-13 PROBLEM — R10.9 ABDOMINAL PAIN: Status: ACTIVE | Noted: 2018-03-12

## 2020-01-13 PROBLEM — L89.154 PRESSURE INJURY OF SACRAL REGION, STAGE 4 (H): Status: ACTIVE | Noted: 2019-11-01

## 2020-01-13 PROBLEM — G82.50 TETRAPARESIS (H): Status: ACTIVE | Noted: 2018-04-02

## 2020-01-13 PROCEDURE — G0463 HOSPITAL OUTPT CLINIC VISIT: HCPCS | Mod: ZF

## 2020-01-13 PROCEDURE — G0476 HPV COMBO ASSAY CA SCREEN: HCPCS | Performed by: OBSTETRICS & GYNECOLOGY

## 2020-01-13 PROCEDURE — 88175 CYTOPATH C/V AUTO FLUID REDO: CPT | Performed by: OBSTETRICS & GYNECOLOGY

## 2020-01-13 PROCEDURE — 87624 HPV HI-RISK TYP POOLED RSLT: CPT | Performed by: OBSTETRICS & GYNECOLOGY

## 2020-01-13 PROCEDURE — 99215 OFFICE O/P EST HI 40 MIN: CPT | Mod: ZP | Performed by: OBSTETRICS & GYNECOLOGY

## 2020-01-13 RX ORDER — PREGABALIN 50 MG/1
CAPSULE ORAL
COMMUNITY
Start: 2019-12-12 | End: 2020-01-30

## 2020-01-13 RX ORDER — PHENAZOPYRIDINE HYDROCHLORIDE 200 MG/1
200 TABLET, FILM COATED ORAL ONCE
Status: CANCELLED | OUTPATIENT
Start: 2020-01-13 | End: 2020-01-13

## 2020-01-13 RX ORDER — CIPROFLOXACIN 2 MG/ML
400 INJECTION, SOLUTION INTRAVENOUS SEE ADMIN INSTRUCTIONS
Status: CANCELLED | OUTPATIENT
Start: 2020-01-13

## 2020-01-13 RX ORDER — CIPROFLOXACIN 2 MG/ML
400 INJECTION, SOLUTION INTRAVENOUS
Status: CANCELLED | OUTPATIENT
Start: 2020-01-13

## 2020-01-13 ASSESSMENT — PAIN SCALES - GENERAL
PAINLEVEL: NO PAIN (0)

## 2020-01-13 ASSESSMENT — MIFFLIN-ST. JEOR
SCORE: 1417.92
SCORE: 1417.92

## 2020-01-13 NOTE — NURSING NOTE
"Chief Complaint   Patient presents with     Cystoscopy     NGB, review UDS       Blood pressure 100/68, pulse 76, height 1.702 m (5' 7\"), weight 73 kg (161 lb), not currently breastfeeding. Body mass index is 25.22 kg/m .    Patient Active Problem List   Diagnosis     Neurogenic bladder     MS (multiple sclerosis) (H)     DVT (deep venous thrombosis) (H)     Mild major depression (H)     Anxiety     Abdominal pain     Bilateral lower extremity edema     Bladder spasm     Chronic fatigue     Chronic pain disorder     Constipation     Depression     History of recurrent UTI (urinary tract infection)     Ileostomy in place (H)     Ileus (H)     Neutropenia (H)     Onycholysis of toenail     Osteomyelitis (H)     Poor venous access     Pressure injury of sacral region, stage 4 (H)     Recurrent UTI     Sleep difficulties     Spasticity     Tetraparesis (H)     Vaginal candidiasis     Urinary tract infection associated with catheterization of urinary tract (H)       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     Bactrim [Sulfamethoxazole W-Trimethoprim] Nausea     Macrobid [Nitrofurantoin Anhydrous] Nausea and Vomiting       Current Outpatient Medications   Medication Sig Dispense Refill     acetaminophen (TYLENOL) 500 MG tablet Take 1,000 mg by mouth       baclofen (LIORESAL) 10 MG tablet Take 20 mg by mouth daily.       BusPIRone HCl 30 MG TABS Take 30 mg by mouth 2 times daily.       Calcium Carbonate-Vitamin D3 (CALCIUM 600/VITAMIN D) 600-400 MG-UNIT TABS Take 1 tablet by mouth       cephALEXin (KEFLEX) 250 MG capsule Take 1 capsule by mouth every 6 hours. (Patient not taking: Reported on 1/13/2020) 12 capsule 0     Cholecalciferol (VITAMIN D3) Take 5,000 Units by mouth daily.       " citalopram (CELEXA) 40 MG tablet Take 40 mg by mouth daily.       diazepam (VALIUM) 2 MG tablet Take 2 mg by mouth       doxepin (SINEQUAN) 25 MG capsule TAKE 1 CAPSULE BY MOUTH DAILY AT BEDTIME  99     FLORASTOR 250 MG capsule TAKE 2 CAPSULES BY MOUTH TWICE A DAY FOR 2 MONTHS  4     FLUoxetine (PROZAC) 20 MG capsule Take 60 mg by mouth       gabapentin (NEURONTIN) 300 MG capsule Take 300 mg by mouth       heparin 100 UNIT/ML SOLN flush USE 1 SYRINGE TO FLUSH ONCE A DAY PER INFUSION. DO AFTER 20 CC FLUSH OF NS  99     HYDROcodone-acetaminophen 5-325 MG per tablet Take 1-2 tablets by mouth every 4 hours as needed. (Patient not taking: Reported on 1/13/2020) 40 tablet 0     ibuprofen (ADVIL/MOTRIN) 800 MG tablet TAKE 1 TABLET BY MOUTH TWICE A DAY AS NEEDED  99     medroxyPROGESTERone (DEPO-PROVERA) 150 MG/ML injection Inject 150 mg into the muscle every 3 months.       miconazole (MICATIN; MICRO GUARD) 2 % powder Apply  topically 2 times daily. 70 g 0     NORMAL SALINE FLUSH 0.9 % flush USE 10CC TO FLUSH BEFORE IV AND FLUSH 20CC AFTER TWICE A DAY  99     Nutritional Supplements (PROSOURCE CURTIS PO) 30 mLs       ondansetron (ZOFRAN) 4 MG tablet TAKE 1 TABLET BY MOUTH EVERY 4 HOURS AS NEEDED  99     oxybutynin (DITROPAN) 5 MG tablet Take 3 tablets by mouth 2 times daily. 270 tablet 3     oxybutynin ER (DITROPAN XL) 15 MG 24 hr tablet TAKE 1 TAB BY MOUTH AT BEDTIME  99     oxyCODONE (ROXICODONE) 5 MG tablet TAKE 1 TO 2 TABLETS BY MOUTH EVERY 4 HOURS AS NEEDED FOR SEVERE PAIN.       pregabalin (LYRICA) 50 MG capsule        sulfamethoxazole-trimethoprim (BACTRIM DS/SEPTRA DS) 800-160 MG tablet TAKE 1 TABLET BY MOUTH TWICE A DAY  0     traZODone (DESYREL) 100 MG tablet TAKE 1 TAB BY MOUTH AT BEDTIME  99       Social History     Tobacco Use     Smoking status: Never Smoker     Smokeless tobacco: Never Used   Substance Use Topics     Alcohol use: No     Drug use: No       Invasive Procedure Safety Checklist:    Procedure:  Cystoscopy    Action: Complete sections and checkboxes as appropriate.    Pre-procedure:  1. Patient ID Verified with 2 identifiers (Bianca and  or MRN) : YES    2. Procedure and site verified with patient/designee (when able) : YES    3. Accurate consent documentation in medical record : YES    4. H&P (or appropriate assessment) documented in medical record : N/A  H&P must be up to 30 days prior to procedure an updated within 24 hours of                 Procedure as applicable.     5. Relevant diagnostic and radiology test results appropriately labeled and displayed as applicable : YES    6. Blood products, implants, devices, and/or special equipment available for the procedure as applicable : YES    7. Procedure site(s) marked with provider initials [Exclusions: none] : NO    8. Marking not required. Reason : Yes  Procedure does not require site marking    Time Out:     Time-Out performed immediately prior to starting procedure, including verbal and active participation of all team members addressing: YES    1. Correct patient identity.  2. Confirmed that the correct side and site are marked.  3. An accurate procedure to be done.  4. Agreement on the procedure to be done.  5. Correct patient position.  6. Relevant images and results are properly labeled and appropriately displayed.  7. The need to administer antibiotics or fluids for irrigation purposes during the procedure as applicable.  8. Safety precautions based on patient history or medication use.    During Procedure: Verification of correct person, site, and procedure occurs any time the responsibility for care of the patient is transferred to another member of the care team.    No medications given during this visit.    Coty Velasquez LPN  2020  2:43 PM

## 2020-01-13 NOTE — LETTER
1/13/2020       RE: Marsha Mcneill  [address]  [facility] [address]     Dear Colleague,    Thank you for referring your patient, Marsha Mcneill, to the [facility] UROLOGY AND INST FOR PROSTATE AND UROLOGIC CANCERS at [facility]. Please see a copy of my visit note below.    PREPROCEDURE DIAGNOSES:    1. 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 Incontinence per urethra  3. Recurrent UTIs    POSTPROCEDURE DIAGNOSES:  -Normal bladder capacity (620 mL) with slightly delayed filling sensations.  -Good bladder compliance without DO/DOI.  -Leakage per Erik stoma occurring at fill volume of 620 mL, with Pdet at ~5 cm H20, at which time patient reports max capacity.  -No formal voiding phase as the patient does not void volitionally.  -EMG quiet throughout the study  -Fluoroscopy reveals a moderately-trabeculated, lobulated bladder wall with diverticulae to the right upper aspect.  No vesicoureteral reflux was observed.  The bladder neck appears open during filling.    PROCEDURE:    1. Sterile urethral catheterization for measurement of postvoid residual urine volume.  2. Complex filling cystometrogram with measurement of bladder and rectal pressures.  3. Electromyography of the pelvic floor during urodynamics.  4. Fluoroscopic imaging of the bladder during urodynamics, at least 3 views.    5. Interpretation of urodynamics and flouroscopic imaging.      INDICATIONS FOR PROCEDURE:  Ms. 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 Video urodynamic assessment is requested today by Dr. Westbrook to better characterize Ms. Marsha Mcneill's voiding dysfunction.      VOIDING DIARY:  Not completed.    DESCRIPTION OF PROCEDURE:  Risks, benefits, and alternatives to urodynamics were discussed with the patient and she wished to proceed.  Urodynamics are planned to better assess the primary  etiology for Ms. Mcneill's urologic dysfunction.  The patient last took anticholinergic medication 6 hours ago.  After informed consent was obtained, the patient was taken to the procedure room where the study was initiated. Findings below.     PRE-STUDY UROFLOWMETRY:  Uroflow not completed.  Postvoid residual by catheter: 325 mL.    Next a 7F double-lumen urodynamics catheter was inserted into the bladder under sterile technique via the Erik channel.  A 7F abdominal manometry catheter was placed in the rectum.  EMG pads were placed on both sides of the anal verge.  The bladder was filled with 200 mL of Omnipaque at 30 mL/minute and serial pressures were recorded.  With coughing there was an appropriate rise in vesical and abdominal pressures with no change in detrusor pressure, confirming good study catheter placement.    DURING THE FILLING PHASE:  First sensation: 284 mL.  First Desire: 373 mL.  Strong Desire: 620 mL.  Maximum Capacity: 620 mL.    Uninhibited detrusor contractions: None.  Compliance: Good. PDet=3.5 cmH20 at capacity. Compliance ratio of 177.  Continence: Patient leaks per Erik stoma at fill volume of 620 mL, with Pdet reaching ~5 cm H20, at which time patient reports max capacity.  EMG: Concordant during filling.    DURING THE VOIDING PHASE:  No formal voiding phase as the patient does not void volitionally.    FLUOROSCOPIC IMAGING OF THE BLADDER DURING URODYNAMICS:  Please note, image numbers on UDS tracings correlate with iSite series numbers on PACS images. Fluoroscopy during today's procedure demonstrated a moderately-trabeculated, lobulated bladder wall with diverticulae to the right upper aspect.  No vesicoureteral reflux was observed.  The bladder neck appears open during filling.  After voiding to completion, all catheters were removed and the patient was brought back into the consultation room to further discuss today's study results.      ASSESSMENT/PLAN:  Ms. Marsha Mcneill is a pleasant  44 year old female with NGB 2/2 progressive MS, managed with Erik channel, incontinence per urethra, and recurrent UTIs who demonstrated the following findings today on urodynamic evaluation:    -Normal bladder capacity (620 mL) with slightly delayed filling sensations.  -Good bladder compliance without DO/DOI.  -Leakage per Erik stoma occurring at fill volume of 620 mL, with Pdet at ~5 cm H20, at which time patient reports max capacity.  -No formal voiding phase as the patient does not void volitionally.  -EMG quiet throughout the study  -Fluoroscopy reveals a moderately-trabeculated, lobulated bladder wall with diverticulae to the right upper aspect.  No vesicoureteral reflux was observed.  The bladder neck appears open during filling.    The patient will follow up as scheduled with Dr. Westbrook to further discuss today's study results and make plans for how best to proceed.      - As the patient is currently completing a course of antibiotics for treatment of a UTI, no additional UTI prophylaxis was provided.    Thank you for allowing me to participate in the care of Ms. Marsha Mcneill and please don't hesitate to contact me with any questions or concerns.      This procedure was performed under a collaborative agreement with Dr. Errol Westbrook, Professor and  of Urology, [facility].    OLIVIA Moses, CNP  Department of Urology

## 2020-01-13 NOTE — NURSING NOTE
"Oncology Rooming Note    January 13, 2020 11:19 AM   Marsha Mcneill is a 44 year old female who presents for:    Chief Complaint   Patient presents with     Oncology Clinic Visit     New; Ovarian Mass     Initial Vitals: BP 91/58   Pulse 86   Temp 98.1  F (36.7  C) (Oral)   Resp 14   SpO2 98%  Estimated body mass index is 33.67 kg/m  as calculated from the following:    Height as of 1/7/13: 1.702 m (5' 7\").    Weight as of 1/7/13: 97.5 kg (215 lb). There is no height or weight on file to calculate BSA.  No Pain (0) Comment: Data Unavailable   No LMP recorded. (Menstrual status: Amenorrhea).  Allergies reviewed: Yes  Medications reviewed: Yes    Medications: Medication refills not needed today.  Pharmacy name entered into Jumbas:    [id number] - [place], [place] - [address] [place], [place] - [address] [facility] [place], [place] - [id number] [place], [place] - [address]    Clinical concerns: No concerns        Becky Morgan CMA              "

## 2020-01-13 NOTE — PATIENT INSTRUCTIONS
Pap smear done today, will be notified with test results  Surgery to be scheduled  Post operative appointment to be scheduled

## 2020-01-13 NOTE — PROGRESS NOTES
Urology Clinic    Errol Westbrook MD  Date of Service: 2020     Name: Marsha Mcneill  MRN: [id number]  Age: 44 year old  : 1975  Referring provider: Errol Westbrook     Assessment and Plan:  Assessment:  Marsha Mcneill  is a 44 year old female with hx of progressive MS, NGB, and neurogenic bowel. She is s/p ileocystoplasty with Erik catheterizable channel in Dec 2012, and diverting ileostomy at [facility] in 2018. She has had increasing difficulty catheterizing the Erik with incontinence per urethra and Erik, as well as recurrent UTI's.     Plan:  We will proceed with excision of dermoid cyst with Gyn. This may involve some dissection of bladder and augment or channel off the mass. We will then reassess leakage and UTIs. For now we gave her an Rx for Gent nighttime instillation.         ---------------------------------------------------------------------------------------------------------------------    HPI:   Marsha Mcneill  is a 44 year old female with a history of neurogenic bladder secondary to progressive Multiple Sclerosis.  Patient is wheelchair bound. She has a hx of NGB (small capacity and DOA on UDS) s/p ileocystoplasty with Erik catheterizable channel in Dec 2012.     She returned in 2019, previously last seen in 2013, with progressive issues with catheterization (done by her nurse at her NH) over the past year and leakage per urethra and stoma, and recurrent UTI's - the UTI's p/w cloudy/foul urine, with occasional fevers/chills. She remains on oxybutinin 15 mg ER qday.      Of note, she also had a recent diagnosis of left sacral decubitus ulcer s/p I&D, wound vac, and now WTD dressing changes with plan for flap with plastics in the near future. An MRI done to evaluate this noted a large ovarian dermoid tumor, sitting against the posterior bladder wall. A follow-up MUNIRA noted a large mass approximating the posterior bladder wall.      Her MS is stable - she recently received rituximab  infusions -- last 6m ago        Previous Bladder Surgeries:  Previous Bladder Augmentation: ileocecal cystoplasty in 12/2012  Catheterizable stoma: erik  Anti-incontinence procedures: none  Botox injections: None     Pertinent Medications:  Current Anticholinergics: oxybtinin 15 mg ER  Current Prophylactic antibiotics: None     Catheterization History:  She is catheterized with nursing assistance using a 14F straight catheter, QID, until the last few days.   They perform irrigation via Erik using 40 ml, once daily     Incontinence History:  She does leak between voids/caths. She does not consistently experience urgency of urination. She does not experience stress urinary incontinence      Urinary Tract Infection History:  Treated with antibiotics for positive culture and non-specific symptoms: 4 times in the last year, and always treated with bactrim based on sensitivities  Treated with antibiotics for positive culture plus symptoms of UTI: 4 times in the last year     Bowel Movement History:  The patient has an ileostomy for neurogenic bowel - done in June 2018 at [facility].    Review of Systems:   Pertinent items are noted in HPI or as below, remainder of complete ROS is negative.      Active Medications:     Current Outpatient Medications:      acetaminophen (TYLENOL) 500 MG tablet, Take 1,000 mg by mouth, Disp: , Rfl:      baclofen (LIORESAL) 10 MG tablet, Take 20 mg by mouth daily., Disp: , Rfl:      BusPIRone HCl 30 MG TABS, Take 30 mg by mouth 2 times daily., Disp: , Rfl:      Calcium Carbonate-Vitamin D3 (CALCIUM 600/VITAMIN D) 600-400 MG-UNIT TABS, Take 1 tablet by mouth, Disp: , Rfl:      cephALEXin (KEFLEX) 250 MG capsule, Take 1 capsule by mouth every 6 hours. (Patient not taking: Reported on 1/13/2020), Disp: 12 capsule, Rfl: 0     Cholecalciferol (VITAMIN D3), Take 5,000 Units by mouth daily., Disp: , Rfl:      citalopram (CELEXA) 40 MG tablet, Take 40 mg by mouth daily., Disp: , Rfl:      diazepam  (VALIUM) 2 MG tablet, Take 2 mg by mouth, Disp: , Rfl:      doxepin (SINEQUAN) 25 MG capsule, TAKE 1 CAPSULE BY MOUTH DAILY AT BEDTIME, Disp: , Rfl: 99     FLORASTOR 250 MG capsule, TAKE 2 CAPSULES BY MOUTH TWICE A DAY FOR 2 MONTHS, Disp: , Rfl: 4     FLUoxetine (PROZAC) 20 MG capsule, Take 60 mg by mouth, Disp: , Rfl:      gabapentin (NEURONTIN) 300 MG capsule, Take 300 mg by mouth, Disp: , Rfl:      heparin 100 UNIT/ML SOLN flush, USE 1 SYRINGE TO FLUSH ONCE A DAY PER INFUSION. DO AFTER 20 CC FLUSH OF NS, Disp: , Rfl: 99     HYDROcodone-acetaminophen 5-325 MG per tablet, Take 1-2 tablets by mouth every 4 hours as needed., Disp: 40 tablet, Rfl: 0     ibuprofen (ADVIL/MOTRIN) 800 MG tablet, TAKE 1 TABLET BY MOUTH TWICE A DAY AS NEEDED, Disp: , Rfl: 99     medroxyPROGESTERone (DEPO-PROVERA) 150 MG/ML injection, Inject 150 mg into the muscle every 3 months., Disp: , Rfl:      miconazole (MICATIN; MICRO GUARD) 2 % powder, Apply  topically 2 times daily. (Patient not taking: Reported on 1/13/2020), Disp: 70 g, Rfl: 0     NORMAL SALINE FLUSH 0.9 % flush, USE 10CC TO FLUSH BEFORE IV AND FLUSH 20CC AFTER TWICE A DAY, Disp: , Rfl: 99     Nutritional Supplements (PROSOURCE CURTIS PO), 30 mLs, Disp: , Rfl:      ondansetron (ZOFRAN) 4 MG tablet, TAKE 1 TABLET BY MOUTH EVERY 4 HOURS AS NEEDED, Disp: , Rfl: 99     oxybutynin (DITROPAN) 5 MG tablet, Take 3 tablets by mouth 2 times daily., Disp: 270 tablet, Rfl: 3     oxybutynin ER (DITROPAN XL) 15 MG 24 hr tablet, TAKE 1 TAB BY MOUTH AT BEDTIME, Disp: , Rfl: 99     oxyCODONE (ROXICODONE) 5 MG tablet, TAKE 1 TO 2 TABLETS BY MOUTH EVERY 4 HOURS AS NEEDED FOR SEVERE PAIN., Disp: , Rfl:      pregabalin (LYRICA) 50 MG capsule, , Disp: , Rfl:      sulfamethoxazole-trimethoprim (BACTRIM DS/SEPTRA DS) 800-160 MG tablet, TAKE 1 TABLET BY MOUTH TWICE A DAY, Disp: , Rfl: 0     traZODone (DESYREL) 100 MG tablet, TAKE 1 TAB BY MOUTH AT BEDTIME, Disp: , Rfl: 99      Allergies:   Augmentin;  Mushroom; Povidone iodine; Amoxicillin; Aspirin; Bactrim [sulfamethoxazole w-trimethoprim]; and Macrobid [nitrofurantoin anhydrous]      Past Medical History:  Multiple sclerosis   UTI   Neurogenic bladder   DVT   Depression   Anxiety      Past Surgical History:  Diversion Ileostomy at [facility] in 2018  Benji-catheter for infusions  Bladder augmentation, appendectomy, zahra bladder stoma 12/7/2012   Insert baclofen pump 2009   Exploratory laparotomy, removal of inter abdominal drain 12/20/2012      Family History:   Mother: cancer, diabetes   Maternal grandfather: diabetes       Social History:   No tobacco use.   No alcohol use.      Procedure:  After dilating the stoma up with a 14 Greek, 16 Greek and 18 Greek catheter the flexible cystoscope was inserted through the catheterizable stoma and down into the bladder.  The catheterizable channel was healthy throughout with no evidence of strictures or false passages.  The connection between the channel and the bladder was wide open with only weak evidence of a valve or tunnel.  The bladder was then entered with the scope and there was no evidence of any stones or mucus.  I could see the desmoid tumor pressing in on the back wall the bladder at about the junction between the native bladder and the augment on the right posterior wall the seem to be well away from the insertion of the catheterizable channel but may be close to the right ureteral orifice.  The scope was then removed.  Laboratory:   I personally reviewed all applicable laboratory data and went over findings with patient  Significant for:    CBC RESULTS:  Recent Labs   Lab Test 12/23/12  0715 12/20/12  0707 12/17/12  0732 12/15/12  0750 12/13/12  0723 12/12/12  0640   WBC  --   --  8.7 10.3 3.8* 3.7*   HGB  --   --  10.3* 9.8* 9.8* 10.2*    254 281 218 176 179     BMP RESULTS:  Recent Labs   Lab Test 12/21/12  0657 12/19/12  0855 12/17/12  0732 12/16/12  2352 12/16/12  0703 12/15/12  0750   12/14/12  0703 12/13/12  0723    142 141  --  140 137  --  140 139   POTASSIUM 3.8 3.8 3.7 3.6 3.0* 3.5   < > 3.0* 3.2*   CHLORIDE 109 109 110*  --   --  108  --  108 110*   CO2 21 25 25  --   --  22  --  24 24   ANIONGAP  --   --  7  --   --  7  --  7 6   GLC 77 84 105*  --  94 102*  --  94 96   BUN 6 9 9  --   --  9  --  6 7   CR 0.54 0.54 0.47*  --  0.47* 0.53  --  0.51* 0.56   GFRESTIMATED >90 >90 >90  --  >90 >90  --  >90 >90   GFRESTBLACK >90 >90 >90  --  >90 >90  --  >90 >90   LILIBETH 8.4* 8.9 8.3*  --   --  8.1*  --  8.3* 8.0*    < > = values in this interval not displayed.       Scribe Disclosure:      Keira BARBOSA, a scribe, prepared the chart for today's encounter.

## 2020-01-13 NOTE — LETTER
2020       RE: Marsha Mcneill  [address]  [facility] [address]     Dear Colleague,    Thank you for referring your patient, Marsha Mcneill, to the [facility] UROLOGY AND INST FOR PROSTATE AND UROLOGIC CANCERS at [facility]. Please see a copy of my visit note below.      Urology Clinic    Errol Westbrook MD  Date of Service: 2020     Name: Marsha Mcneill  MRN: [id number]  Age: 44 year old  : 1975  Referring provider: Errol Westbrook     Assessment and Plan:  Assessment:  Marsha Mcneill  is a 44 year old female with hx of progressive MS, NGB, and neurogenic bowel. She is s/p ileocystoplasty with Erik catheterizable channel in Dec 2012 , and diverting ileostomy at [facility] in 2018. She has had increasing difficulty catheterizing the Erik with incontinence per urethra and Erik, as well as recurrent UTI's.     Plan:  We will proceed with excision of dermoid cyst with Gyn. This may involve some dissection of bladder and augment or channel off the mass. We will then reassess leakage and UTIs. For now we gave her an Rx for Gent nighttime instillation.         ---------------------------------------------------------------------------------------------------------------------    HPI:   Marsha Mcneill  is a 44 year old female with a history of neurogenic bladder secondary to progressive Multiple Sclerosis.  Patient is wheelchair bound. She has a hx of NGB (small capacity and DOA on UDS) s/p ileocystoplasty with Erik catheterizable channel in Dec 2012.     She returned in 2019, previously last seen in 2013, with progressive issues with catheterization (done by her nurse at her NH) over the past year and leakage per urethra and stoma, and recurrent UTI's - the UTI's p/w cloudy/foul urine, with occasional fevers/chills. She remains on oxybutinin 15 mg ER qday.      Of note, she also had a recent diagnosis of left sacral decubitus ulcer s/p I&D, wound vac, and now WTD  dressing changes with plan for flap with plastics in the near future. An MRI done to evaluate this noted a large ovarian dermoid tumor, sitting against the posterior bladder wall. A follow-up MUNIRA noted a large mass approximating the posterior bladder wall.      Her MS is stable - she recently received rituximab infusions -- last 6m ago        Previous Bladder Surgeries:  Previous Bladder Augmentation: ileocecal cystoplasty in 12/2012  Catheterizable stoma: erik  Anti-incontinence procedures: none  Botox injections: None     Pertinent Medications:  Current Anticholinergics: oxybtinin 15 mg ER  Current Prophylactic antibiotics: None     Catheterization History:  She is catheterized with nursing assistance using a 14F straight catheter, QID, until the last few days.   They perform irrigation via Erik using 40 ml, once daily     Incontinence History:  She does leak between voids/caths. She does not consistently experience urgency of urination. She does not experience stress urinary incontinence      Urinary Tract Infection History:  Treated with antibiotics for positive culture and non-specific symptoms: 4 times in the last year, and always treated with bactrim based on sensitivities  Treated with antibiotics for positive culture plus symptoms of UTI: 4 times in the last year     Bowel Movement History:  The patient has an ileostomy for neurogenic bowel - done in June 2018 at [facility].    Review of Systems:   Pertinent items are noted in HPI or as below, remainder of complete ROS is negative.      Active Medications:     Current Outpatient Medications:      acetaminophen (TYLENOL) 500 MG tablet, Take 1,000 mg by mouth, Disp: , Rfl:      baclofen (LIORESAL) 10 MG tablet, Take 20 mg by mouth daily., Disp: , Rfl:      BusPIRone HCl 30 MG TABS, Take 30 mg by mouth 2 times daily., Disp: , Rfl:      Calcium Carbonate-Vitamin D3 (CALCIUM 600/VITAMIN D) 600-400 MG-UNIT TABS, Take 1 tablet by mouth, Disp: , Rfl:      cephALEXin  (KEFLEX) 250 MG capsule, Take 1 capsule by mouth every 6 hours. (Patient not taking: Reported on 1/13/2020), Disp: 12 capsule, Rfl: 0     Cholecalciferol (VITAMIN D3), Take 5,000 Units by mouth daily., Disp: , Rfl:      citalopram (CELEXA) 40 MG tablet, Take 40 mg by mouth daily., Disp: , Rfl:      diazepam (VALIUM) 2 MG tablet, Take 2 mg by mouth, Disp: , Rfl:      doxepin (SINEQUAN) 25 MG capsule, TAKE 1 CAPSULE BY MOUTH DAILY AT BEDTIME, Disp: , Rfl: 99     FLORASTOR 250 MG capsule, TAKE 2 CAPSULES BY MOUTH TWICE A DAY FOR 2 MONTHS, Disp: , Rfl: 4     FLUoxetine (PROZAC) 20 MG capsule, Take 60 mg by mouth, Disp: , Rfl:      gabapentin (NEURONTIN) 300 MG capsule, Take 300 mg by mouth, Disp: , Rfl:      heparin 100 UNIT/ML SOLN flush, USE 1 SYRINGE TO FLUSH ONCE A DAY PER INFUSION. DO AFTER 20 CC FLUSH OF NS, Disp: , Rfl: 99     HYDROcodone-acetaminophen 5-325 MG per tablet, Take 1-2 tablets by mouth every 4 hours as needed., Disp: 40 tablet, Rfl: 0     ibuprofen (ADVIL/MOTRIN) 800 MG tablet, TAKE 1 TABLET BY MOUTH TWICE A DAY AS NEEDED, Disp: , Rfl: 99     medroxyPROGESTERone (DEPO-PROVERA) 150 MG/ML injection, Inject 150 mg into the muscle every 3 months., Disp: , Rfl:      miconazole (MICATIN; MICRO GUARD) 2 % powder, Apply  topically 2 times daily. (Patient not taking: Reported on 1/13/2020), Disp: 70 g, Rfl: 0     NORMAL SALINE FLUSH 0.9 % flush, USE 10CC TO FLUSH BEFORE IV AND FLUSH 20CC AFTER TWICE A DAY, Disp: , Rfl: 99     Nutritional Supplements (PROSOURCE CURTIS PO), 30 mLs, Disp: , Rfl:      ondansetron (ZOFRAN) 4 MG tablet, TAKE 1 TABLET BY MOUTH EVERY 4 HOURS AS NEEDED, Disp: , Rfl: 99     oxybutynin (DITROPAN) 5 MG tablet, Take 3 tablets by mouth 2 times daily., Disp: 270 tablet, Rfl: 3     oxybutynin ER (DITROPAN XL) 15 MG 24 hr tablet, TAKE 1 TAB BY MOUTH AT BEDTIME, Disp: , Rfl: 99     oxyCODONE (ROXICODONE) 5 MG tablet, TAKE 1 TO 2 TABLETS BY MOUTH EVERY 4 HOURS AS NEEDED FOR SEVERE PAIN., Disp: ,  Rfl:      pregabalin (LYRICA) 50 MG capsule, , Disp: , Rfl:      sulfamethoxazole-trimethoprim (BACTRIM DS/SEPTRA DS) 800-160 MG tablet, TAKE 1 TABLET BY MOUTH TWICE A DAY, Disp: , Rfl: 0     traZODone (DESYREL) 100 MG tablet, TAKE 1 TAB BY MOUTH AT BEDTIME, Disp: , Rfl: 99      Allergies:   Augmentin; Mushroom; Povidone iodine; Amoxicillin; Aspirin; Bactrim [sulfamethoxazole w-trimethoprim]; and Macrobid [nitrofurantoin anhydrous]      Past Medical History:  Multiple sclerosis   UTI   Neurogenic bladder   DVT   Depression   Anxiety      Past Surgical History:  Diversion Ileostomy at [facility] in 2018  Benji-catheter for infusions  Bladder augmentation, appendectomy, zahra bladder stoma 12/7/2012   Insert baclofen pump 2009   Exploratory laparotomy, removal of inter abdominal drain 12/20/2012      Family History:   Mother: cancer, diabetes   Maternal grandfather: diabetes       Social History:   No tobacco use.   No alcohol use.      Procedure:  After dilating the stoma up with a 14 [place] citizen, 16 [place] citizen and 18 [place] citizen catheter the flexible cystoscope was inserted through the catheterizable stoma and down into the bladder.  The catheterizable channel was healthy throughout with no evidence of strictures or false passages.  The connection between the channel and the bladder was wide open with only weak evidence of a valve or tunnel.  The bladder was then entered with the scope and there was no evidence of any stones or mucus.  I could see the desmoid tumor pressing in on the back wall the bladder at about the junction between the native bladder and the augment on the right posterior wall the seem to be well away from the insertion of the catheterizable channel but may be close to the right ureteral orifice.  The scope was then removed.  Laboratory:   I personally reviewed all applicable laboratory data and went over findings with patient  Significant for:    CBC RESULTS:  Recent Labs   Lab Test 12/23/12  0715 12/20/12  0707  12/17/12  0732 12/15/12  0750 12/13/12  0723 12/12/12  0640   WBC  --   --  8.7 10.3 3.8* 3.7*   HGB  --   --  10.3* 9.8* 9.8* 10.2*    254 281 218 176 179     BMP RESULTS:  Recent Labs   Lab Test 12/21/12  0657 12/19/12  0855 12/17/12  0732 12/16/12  2352 12/16/12  0703 12/15/12  0750  12/14/12  0703 12/13/12  0723    142 141  --  140 137  --  140 139   POTASSIUM 3.8 3.8 3.7 3.6 3.0* 3.5   < > 3.0* 3.2*   CHLORIDE 109 109 110*  --   --  108  --  108 110*   CO2 21 25 25  --   --  22  --  24 24   ANIONGAP  --   --  7  --   --  7  --  7 6   GLC 77 84 105*  --  94 102*  --  94 96   BUN 6 9 9  --   --  9  --  6 7   CR 0.54 0.54 0.47*  --  0.47* 0.53  --  0.51* 0.56   GFRESTIMATED >90 >90 >90  --  >90 >90  --  >90 >90   GFRESTBLACK >90 >90 >90  --  >90 >90  --  >90 >90   LILIBETH 8.4* 8.9 8.3*  --   --  8.1*  --  8.3* 8.0*    < > = values in this interval not displayed.       Scribe Disclosure:      I, Keira Pereyra, a scribe, prepared the chart for today's encounter.             Again, thank you for allowing me to participate in the care of your patient.      Sincerely,    Errol Westbrook MD

## 2020-01-13 NOTE — LETTER
2020       RE: Marsha Mcneill  [address]  [facility] [address]     Dear Colleague,    Thank you for referring your patient, Marsha Mcneill, to the [facility]. Please see a copy of my visit note below.                            Consult Notes on Referred Patient    Date: 2020       Dr. Errol Westbrook MD  [address]  [place] [address]       RE: Marsha Mcneill  : 1975  VINI: 2020    Dear Dr. Errol Westbrook:    I had the pleasure of seeing your patient Marsha Mcneill here at the Gynecologic Cancer Clinic at the [facility] on 2020.  As you know she is a very pleasant 44 year old woman with progressive  a recent diagnosis of pelvic mass causing obstructive symptoms to her urinary diversion.  Given these findings she was subsequently sent to the Gynecologic Cancer Clinic for new patient consultation.     HPI - She began noting some difficulty with catheterization months ago, but reports it has been a slow and steady change.  Ultimately this became a more acute issue, for which she sought attention.  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  She is doing well and can currently catheterize with some difficulty.    She has a long standing h/o MS for which she has an appointment next week.  This has been progressive and though she is greatly restricted in her motion, she does have sensation.    She has been out of routine health care screening for some time and has had neither Pap or Mammogram to date.      Review of Systems:    Systemic           no weight changes; no fever; no chills; no night sweats; no appetite changes  Skin           no rashes, or lesions  Eye           no irritation; no changes in vision  Jenaro-Laryngeal           no dysphagia; no hoarseness   Pulmonary    no cough; no shortness of breath  Cardiovascular    no chest pain; no  palpitations  Gastrointestinal    no diarrhea; no constipation; no abdominal pain; no changes in bowel  habits; no blood in stool  Genitourinary   no urinary frequency; no urinary urgency; no dysuria; no pain; no abnormal vaginal discharge; no abnormal vaginal bleeding  Breast   no breast discharge; no breast changes; no breast pain  Musculoskeletal    no myalgias; no arthralgias; no back pain  Psychiatric           no depressed mood; no anxiety    Hematologic           no tender lymph nodes; no noticeable swellings or lumps   Endocrine    no hot flashes; no heat/cold intolerance         Neurological   no tremor; no numbness and tingling; no headaches; no difficulty  sleeping      Past Medical History:    Past Medical History:   Diagnosis Date     Multiple scleroses      Neurological disorders      Urinary tract infection          Past Surgical History:    Past Surgical History:   Procedure Laterality Date     BLADDER AUGMENTATION  12/7/2012    Procedure: BLADDER AUGMENTATION;  APPENDECTOMY, BLADDER AUGMENTATION, HUMBERTO BLADDER STOMA;  Surgeon: Errol Westbrook MD;  Location: [facility] OR     INSERT PUMP BACLOFEN  2009     LAPAROTOMY EXPLORATORY  12/20/2012    Procedure: LAPAROTOMY EXPLORATORY;  Removal of Inter Abdominal Drain;  Surgeon: Errlo Westbrook MD;  Location: [facility] OR     MITROFANOFF PROCEDURE (APPENDIX CONDUIT)  12/7/2012    Procedure: MITROFANOFF PROCEDURE (APPENDIX CONDUIT);;  Surgeon: Errol Westbrook MD;  Location: [facility] OR         Health Maintenance:  Health Maintenance Due   Topic Date Due     PREVENTIVE CARE VISIT  1975     DEPRESSION ACTION PLAN  1975     DTAP/TDAP/TD IMMUNIZATION (1 - Tdap) 07/06/1987     HIV SCREENING  07/06/1991     HPV  07/06/1997     PAP  07/06/2001     INFLUENZA VACCINE (1) 09/01/2019       Last Pap Smear: No recent ones, no history of abnormal paps per patient    Last Mammogram: none    Last Colonoscopy: none                       Current Medications:      has a current medication list which includes the following prescription(s): acetaminophen, baclofen, buspirone hcl, calcium carbonate-vitamin d3, cephalexin, cholecalciferol, citalopram, diazepam, doxepin, florastor, fluconazole, fluoxetine, gabapentin, heparin, hydrocodone-acetaminophen, ibuprofen, medroxyprogesterone, miconazole, normal saline flush, nutritional supplements, ondansetron, oxybutynin, oxybutynin er, oxycodone, sulfamethoxazole-trimethoprim, trazodone, and vancomycin.       Allergies:        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     Bactrim [Sulfamethoxazole W-Trimethoprim] Nausea     Macrobid [Nitrofurantoin Anhydrous] Nausea and Vomiting             Social History:     Social History     Tobacco Use     Smoking status: Never Smoker     Smokeless tobacco: Never Used   Substance Use Topics     Alcohol use: No       History   Drug Use No           Family History:     The patient's family history is notable for .    Family History   Problem Relation Age of Onset     Cancer Mother      Diabetes Mother      Diabetes Maternal Grandfather          Physical Exam:     PS 3  VS: BP 91/58   Pulse 86   Temp 98.1  F (36.7  C) (Oral)   Resp 14   SpO2 98%      General Appearance: healthy and alert, no distress     HEENT:  no thyromegaly, no palpable nodules or masses        Cardiovascular: regular rate and rhythm, no gallops, rubs or murmurs     Respiratory: lungs clear, no rales, rhonchi or wheezes, normal diaphragmatic excursion    Musculoskeletal: extremities non tender and without edema    Skin: no lesions or rashes     Neurological: normal gait, no gross defects     Psychiatric: appropriate mood and affect                               Hematological: normal cervical,  supraclavicular and inguinal lymph nodes     Gastrointestinal:       abdomen soft, non-tender, non-distended, no organomegaly or masses    Genitourinary: External genitalia and urethral meatus appears normal.  Vagina is smooth without nodularity or masses.  Cervix appears normal and without lesions.  Bimanual exam reveals fullness, but I can not appreciate a salvatore mass.  Recto-vaginal exam confirms these findings.  There is no peritoneal studding    Breast - no dominant masses, no axillary masses, no expression from the nipples.      Assessment:    Marsha Mcneill is a 44 year old woman with a new diagnosis of pelvic mass.     A total of 60 minutes was spent with the patient, 40 minutes of which were spent in counseling the patient and/or treatment planning.      Plan:     1.)   Pelvic mass: We have discussed the clinical and radiologic findings.  I have instructed the patient that malignancy is not excluded, though felt to be less likely.  We have discussed options and she is prepared for a laparotomy and resection of pelvic mass with possible open surgery or debulking in the setting of malignancy.  We have discussed laparoscopy as well - but her multiple previous surgeries, ostomies, and implanted device (baclofen pump) make this a higher risk circumstance.  As such I think she would be best served by an infra-umbilical midline laparotomy.  Will begin the pre-operative processes today.    I have recommended resection of the mass and both fallopian tubes.  She would like to keep the other ovary and is having problems with osteopenia already, but is prepared for excision in the setting of cancer       2.) Genetic risk factors were assessed and the patient does not meet the qualifications for a referral.      3.) Labs and/or tests ordered include:  Pre-op labs, will go to PAC .     4.) Health maintenance issues addressed today include: pap done, breast examination done.    5.) Pre-op teaching was completed today.  Risks  of surgery were discussed to include: bleeding, transfusion, infection, unintentional injury to surrounding organs/structures.      Thank you for allowing us to participate in the care of your patient.         Sincerely,      Tommie Barron MD        CC  Patient Care Team:  Maddi Mohr NP as PCP - Cruzito Avendano ([facility])  Errol Westbrook MD as MD (Urology)  ERROL WESTBROOK

## 2020-01-13 NOTE — NURSING NOTE
Pre Op Nurse Teaching Template    Relevant Diagnosis: ovarian mass    Teaching Topic:  Open surgery  Removal of pelvic mass both fallopian tubes, possible cancer operation    Person(s) involved in teaching :  Patient  }  Motivation Level:  Asks Questions:    Yes      Eager to Learn:     Yes     Cooperative:          Yes    Receptive (willing. Able to accept information):    Yes      Patient and those who are listed above demonstrates understanding of the following:   Reason for the appointment, diagnosis and treatment plan:   Yes   Knowledge of proper use of medications and conditions for which they are ordered (with special attention to potential side effects or drug interactions): Yes   Which situations necessitate calling provider and whom to contact: Yes         Nutritional needs and diet plan:  Yes      Pain management techniques:     Yes, Pain Scale   Diet:   Yes, [facility] Diet Instructions    Teaching Concerns addressed: Yes    Infection Prevention:  Patient and those who are listed above demonstrate understanding of the following:  Pre-Op CHG Bathing Instructions: Yes  Surgical procedure site care taught:   Yes   Signs and symptoms of infection taught: Yes       Instructional Materials Used/Given:  The [place] Before You Surgery Booklet  Showering or Bathing before Surgery Instructions & CHG Product  Hysterectomy Guidelines  Pain Assessment Tool   Home Care after Major Abdominal or Vaginal Surgery  Map  Accommodations Brochure  Phone numbers for [facility] and [facility]  Copy of Surgical Consent    Done Today:    Preop Visit needed with PAC   Tests Ordered:  Post Op Visit Scheduled:tbd  Comments:    Surgery date/time:tbd    Consent to file in medical records  .

## 2020-01-13 NOTE — NURSING NOTE
"[facility]-Dr. Westbrook    She does cath via channel with a 14 Fr without difficulty on sensation.  She does leak continuously weather she is full or not.      Chief Complaint   Patient presents with     Urodynamics Study     Neurogenic Bladder       Blood pressure 100/68, pulse 76, height 1.702 m (5' 7\"), weight 73 kg (161 lb), not currently breastfeeding. Body mass index is 25.22 kg/m .    Patient Active Problem List   Diagnosis     Neurogenic bladder     MS (multiple sclerosis) (H)     DVT (deep venous thrombosis) (H)     Mild major depression (H)     Anxiety     Abdominal pain     Bilateral lower extremity edema     Bladder spasm     Chronic fatigue     Chronic pain disorder     Constipation     Depression     History of recurrent UTI (urinary tract infection)     Ileostomy in place (H)     Ileus (H)     Neutropenia (H)     Onycholysis of toenail     Osteomyelitis (H)     Poor venous access     Pressure injury of sacral region, stage 4 (H)     Recurrent UTI     Sleep difficulties     Spasticity     Tetraparesis (H)     Vaginal candidiasis     Urinary tract infection associated with catheterization of urinary tract (H)       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     Bactrim [Sulfamethoxazole W-Trimethoprim] Nausea     Macrobid [Nitrofurantoin Anhydrous] Nausea and Vomiting       Current Outpatient Medications   Medication Sig Dispense Refill     acetaminophen (TYLENOL) 500 MG tablet Take 1,000 mg by mouth       baclofen (LIORESAL) 10 MG tablet Take 20 mg by mouth daily.       BusPIRone HCl 30 MG TABS Take 30 mg by mouth 2 times daily.       Calcium Carbonate-Vitamin D3 (CALCIUM 600/VITAMIN D) 600-400 MG-UNIT TABS Take 1 tablet by mouth       Cholecalciferol (VITAMIN D3) Take 5,000 Units by " mouth daily.       citalopram (CELEXA) 40 MG tablet Take 40 mg by mouth daily.       diazepam (VALIUM) 2 MG tablet Take 2 mg by mouth       doxepin (SINEQUAN) 25 MG capsule TAKE 1 CAPSULE BY MOUTH DAILY AT BEDTIME  99     FLORASTOR 250 MG capsule TAKE 2 CAPSULES BY MOUTH TWICE A DAY FOR 2 MONTHS  4     FLUoxetine (PROZAC) 20 MG capsule Take 60 mg by mouth       gabapentin (NEURONTIN) 300 MG capsule Take 300 mg by mouth       heparin 100 UNIT/ML SOLN flush USE 1 SYRINGE TO FLUSH ONCE A DAY PER INFUSION. DO AFTER 20 CC FLUSH OF NS  99     ibuprofen (ADVIL/MOTRIN) 800 MG tablet TAKE 1 TABLET BY MOUTH TWICE A DAY AS NEEDED  99     medroxyPROGESTERone (DEPO-PROVERA) 150 MG/ML injection Inject 150 mg into the muscle every 3 months.       miconazole (MICATIN; MICRO GUARD) 2 % powder Apply  topically 2 times daily. 70 g 0     NORMAL SALINE FLUSH 0.9 % flush USE 10CC TO FLUSH BEFORE IV AND FLUSH 20CC AFTER TWICE A DAY  99     Nutritional Supplements (PROSOURCE CURTIS PO) 30 mLs       ondansetron (ZOFRAN) 4 MG tablet TAKE 1 TABLET BY MOUTH EVERY 4 HOURS AS NEEDED  99     oxybutynin (DITROPAN) 5 MG tablet Take 3 tablets by mouth 2 times daily. 270 tablet 3     oxybutynin ER (DITROPAN XL) 15 MG 24 hr tablet TAKE 1 TAB BY MOUTH AT BEDTIME  99     oxyCODONE (ROXICODONE) 5 MG tablet TAKE 1 TO 2 TABLETS BY MOUTH EVERY 4 HOURS AS NEEDED FOR SEVERE PAIN.       pregabalin (LYRICA) 50 MG capsule        sulfamethoxazole-trimethoprim (BACTRIM DS/SEPTRA DS) 800-160 MG tablet TAKE 1 TABLET BY MOUTH TWICE A DAY  0     traZODone (DESYREL) 100 MG tablet TAKE 1 TAB BY MOUTH AT BEDTIME  99     cephALEXin (KEFLEX) 250 MG capsule Take 1 capsule by mouth every 6 hours. (Patient not taking: Reported on 1/13/2020) 12 capsule 0     HYDROcodone-acetaminophen 5-325 MG per tablet Take 1-2 tablets by mouth every 4 hours as needed. (Patient not taking: Reported on 1/13/2020) 40 tablet 0       Social History     Tobacco Use     Smoking status: Never Smoker      Smokeless tobacco: Never Used   Substance Use Topics     Alcohol use: No     Drug use: No       Invasive Procedure Safety Checklist:    Procedure: Urodynamics    Action: Complete sections and checkboxes as appropriate.  Pre-procedure:  1. Patient ID Verified with 2 identifiers (Bianca and  or MRN) : YES    2. Procedure and site verified with patient/designee (when able) : YES    3. Accurate consent documentation in medical record : YES    4. H&P (or appropriate assessment) documented in medical record : N/A  H&P must be up to 30 days prior to procedure an updated within 24 hours of Procedure as applicable.     5. Relevant diagnostic and radiology test results appropriately labeled and displayed as applicable : YES    6. Blood products, implants, devices, and/or special equipment available for the procedure as applicable : YES    7. Procedure site(s) marked with provider initials [Exclusions: none] : NO    8. Marking not required. Reason : Yes  Procedure does not require site marking    Time Out:     Time-Out performed immediately prior to starting procedure, including verbal and active participation of all team members addressing: YES    1. Correct patient identity.  2. Confirmed that the correct side and site are marked.  3. An accurate procedure to be done.  4. Agreement on the procedure to be done.  5. Correct patient position.  6. Relevant images and results are properly labeled and appropriately displayed.  7. The need to administer antibiotics or fluids for irrigation purposes during the procedure as applicable.  8. Safety precautions based on patient history or medication use.    During Procedure: Verification of correct person, site, and procedure occurs any time the responsibility for care of the patient is transferred to another member of the care team.    The following medication was given: Patient is on an antibiotic and another dose was not given in clinic.    Prior to administration, verified  patient identity using patient's name and date of birth.  Due to administration, patient instructed to remain in clinic for 15 minutes  afterwards, and to report any adverse reaction to me immediately.    Drug Amount Wasted:  None.  Vial/Syringe: Single dose vial      The following medication was given:     MEDICATION:  Omnipaque (Iohexol Injection)  ROUTE: Provider Administered  SITE: Provider Administered via catheter  DOSE: 240mL  LOT #: [id number]  : Acupera  EXPIRATION DATE: 29/05/2022  NDC#: [address]-13   Was there drug waste? No    Prior to injection, verified patient identity using patient's name and date of birth.  Due to injection administration, patient instructed to remain in clinic for 15 minutes  afterwards, and to report any adverse reaction to me immediately.    Drug Amount Wasted:  None.  Vial/Syringe: Single dose vial    Marsha Mcneill comes into clinic today at the request of Dr. Westbrook for Urodynamics.    Order has been verified: Yes.      The following medication was given: See Above    Insertion:  16 Fr straight tipped vinyl straight catheter inserted into mitrofanoff meatus in the usual sterile fashion without difficulty.  Received 325 ml yellow urine output.     Patient did tolerate procedure well.    Patient instructed as to where to call or go for pain, fever, leakage, or decreased urine flow.     Patient instructed as to where to call or go for pain, fever, leakage, or decreased urine flow.     This service provided today was under the direct supervision of Brigette Garcia, who was available if needed.    Yogi Almonte, EMT  1/13/2020  1:29 PM

## 2020-01-13 NOTE — PROGRESS NOTES
PREPROCEDURE DIAGNOSES:    1. 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 Incontinence per urethra  3. Recurrent UTIs    POSTPROCEDURE DIAGNOSES:  -Normal bladder capacity (620 mL) with slightly delayed filling sensations.  -Good bladder compliance without DO/DOI.  -Leakage per Erik stoma occurring at fill volume of 620 mL, with Pdet at ~5 cm H20, at which time patient reports max capacity.  -No formal voiding phase as the patient does not void volitionally.  -EMG quiet throughout the study  -Fluoroscopy reveals a moderately-trabeculated, lobulated bladder wall with diverticulae to the right upper aspect.  No vesicoureteral reflux was observed.  The bladder neck appears open during filling.    PROCEDURE:    1. Sterile urethral catheterization for measurement of postvoid residual urine volume.  2. Complex filling cystometrogram with measurement of bladder and rectal pressures.  3. Electromyography of the pelvic floor during urodynamics.  4. Fluoroscopic imaging of the bladder during urodynamics, at least 3 views.    5. Interpretation of urodynamics and flouroscopic imaging.      INDICATIONS FOR PROCEDURE:  Ms. 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 Video urodynamic assessment is requested today by Dr. Westbrook to better characterize Ms. Marsha Mcneill's voiding dysfunction.      VOIDING DIARY:  Not completed.    DESCRIPTION OF PROCEDURE:  Risks, benefits, and alternatives to urodynamics were discussed with the patient and she wished to proceed.  Urodynamics are planned to better assess the primary etiology for Ms. Mcneill's urologic dysfunction.  The patient last took anticholinergic medication 6 hours ago.  After informed consent was obtained, the patient was taken to the procedure room where the study was initiated. Findings below.     PRE-STUDY UROFLOWMETRY:  Uroflow not completed.  Postvoid residual  by catheter: 325 mL.    Next a 7F double-lumen urodynamics catheter was inserted into the bladder under sterile technique via the Erik channel.  A 7F abdominal manometry catheter was placed in the rectum.  EMG pads were placed on both sides of the anal verge.  The bladder was filled with 200 mL of Omnipaque at 30 mL/minute and serial pressures were recorded.  With coughing there was an appropriate rise in vesical and abdominal pressures with no change in detrusor pressure, confirming good study catheter placement.    DURING THE FILLING PHASE:  First sensation: 284 mL.  First Desire: 373 mL.  Strong Desire: 620 mL.  Maximum Capacity: 620 mL.    Uninhibited detrusor contractions: None.  Compliance: Good. PDet=3.5 cmH20 at capacity. Compliance ratio of 177.  Continence: Patient leaks per Erik stoma at fill volume of 620 mL, with Pdet reaching ~5 cm H20, at which time patient reports max capacity.  EMG: Concordant during filling.    DURING THE VOIDING PHASE:  No formal voiding phase as the patient does not void volitionally.    FLUOROSCOPIC IMAGING OF THE BLADDER DURING URODYNAMICS:  Please note, image numbers on UDS tracings correlate with iSite series numbers on PACS images. Fluoroscopy during today's procedure demonstrated a moderately-trabeculated, lobulated bladder wall with diverticulae to the right upper aspect.  No vesicoureteral reflux was observed.  The bladder neck appears open during filling.  After voiding to completion, all catheters were removed and the patient was brought back into the consultation room to further discuss today's study results.      ASSESSMENT/PLAN:  Ms. 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  -Good bladder compliance without  DO/DOI.  -Leakage per Erik stoma occurring at fill volume of 620 mL, with Pdet at ~5 cm H20, at which time patient reports max capacity.  -No formal voiding phase as the patient does not void volitionally.  -EMG quiet throughout the study  -Fluoroscopy reveals a moderately-trabeculated, lobulated bladder wall with diverticulae to the right upper aspect.  No vesicoureteral reflux was observed.  The bladder neck appears open during filling.    The patient will follow up as scheduled with Dr. Westbrook to further discuss today's study results and make plans for how best to proceed.      - As the patient is currently completing a course of antibiotics for treatment of a UTI, no additional UTI prophylaxis was provided.    Thank you for allowing me to participate in the care of Ms. Marsha Mcneill and please don't hesitate to contact me with any questions or concerns.      This procedure was performed under a collaborative agreement with Dr. Errol Westbrook, Professor and  of Urology, [facility].    OLIVIA Moses, CNP  Department of Urology

## 2020-01-14 ENCOUNTER — TELEPHONE (OUTPATIENT)
Dept: UROLOGY | Facility: CLINIC | Age: 45
End: 2020-01-14

## 2020-01-14 NOTE — TELEPHONE ENCOUNTER
Health Call Center    Phone Message    May a detailed message be left on voicemail: yes    Reason for Call: Medication Question or concern regarding medication   Prescription Clarification  Name of Medication: Gent nighttime instillation.  Prescribing Provider: Dr Westbrook   Pharmacy: [facility]  [address], phone:  [phone]   What on the order needs clarification? Katherine calling to request that the Rx be sent to [facility] pharmacy for the irrigations.  She would also greatly appreciate a copy of the orders that go along with that and any office notes that the clinic is willing to send.  Please call her with any questions or concerns          Action Taken: Message routed to:  [facility]

## 2020-01-15 ENCOUNTER — TELEPHONE (OUTPATIENT)
Dept: UROLOGY | Facility: CLINIC | Age: 45
End: 2020-01-15

## 2020-01-15 DIAGNOSIS — N39.0 RECURRENT UTI: Primary | ICD-10-CM

## 2020-01-15 NOTE — TELEPHONE ENCOUNTER
Sent Gentamicin Rx to [facility] pharmacy.    Radha Earl, RN, BSN  Care Coordinator- Reconstructive Urology

## 2020-01-15 NOTE — TELEPHONE ENCOUNTER
[facility] Call Center    Phone Message    May a detailed message be left on voicemail: yes    Reason for Call: Other: Rut called from [facility], she said the patient has no orders from Dr. Dillard. Please fax all orders and informaation need for the patient to [phone] ASAP. Rut said they need these order right away.     Action Taken: Other: [facility]

## 2020-01-16 LAB
COPATH REPORT: NORMAL
PAP: NORMAL

## 2020-01-16 NOTE — TELEPHONE ENCOUNTER
Faxed Gentamicin orders to [facility] Attn: Rut at [phone].    Radha Earl, RN, BSN  Care Coordinator- Reconstructive Urology

## 2020-01-17 ENCOUNTER — DOCUMENTATION ONLY (OUTPATIENT)
Dept: ONCOLOGY | Facility: CLINIC | Age: 45
End: 2020-01-17

## 2020-01-17 PROBLEM — N83.8 OVARIAN MASS: Status: ACTIVE | Noted: 2020-01-17

## 2020-01-17 NOTE — PROGRESS NOTES
I scheduled surgery for this patient with Dr. Barron on 2/26 at [place]. Scheduled per availability.    The RN has completed the education regarding the surgery, and they were provided a surgery packet with instructions and a map.     They are aware that they will get their finalized times at their appointment with PAC. PAC is scheduled for 2/3 at 10:15 AM.    Per communication - I did not need to call the patient to provide any extra additional information. I will follow up if anything changes.

## 2020-01-20 ENCOUNTER — MEDICAL CORRESPONDENCE (OUTPATIENT)
Dept: HEALTH INFORMATION MANAGEMENT | Facility: CLINIC | Age: 45
End: 2020-01-20

## 2020-01-20 NOTE — TELEPHONE ENCOUNTER
FUTURE VISIT INFORMATION      SURGERY INFORMATION:    Date: 20    Location: [facility] OR    Surgeon:  Dr Tommie Barron and Dr Errol Hernandez    Anesthesia Type:  Choice    Procedure: Laparotomy, removal of pelvic mass, both fallopian tubes, possible cancer operation.    Consult: 20 - Dr Errol Hernandez [facility]    RECORDS REQUESTED FROM:       Primary Care Provider: Dr Maddi Mohr    Pertinent Medical History: MS, DVT    Most recent EKG+ Tracin19, [facility]    Most recent ECHO: N/A    Most recent Cardiac Stress Test: N/A    Most recent Coronary Angiogram: N/A    Most recent PFT's: N/A    Most recent Sleep Study:  N/A    Action 20 MV 4.17pm   Action Taken 19 EKG Strips requested from [facility]     Fax #: [phone]

## 2020-01-21 LAB
FINAL DIAGNOSIS: NORMAL
HPV HR 12 DNA CVX QL NAA+PROBE: NEGATIVE
HPV16 DNA SPEC QL NAA+PROBE: NEGATIVE
HPV18 DNA SPEC QL NAA+PROBE: NEGATIVE
SPECIMEN DESCRIPTION: NORMAL
SPECIMEN SOURCE CVX/VAG CYTO: NORMAL

## 2020-01-22 ENCOUNTER — TRANSCRIBE ORDERS (OUTPATIENT)
Dept: OTHER | Age: 45
End: 2020-01-22

## 2020-01-22 DIAGNOSIS — G35 MULTIPLE SCLEROSIS, PRIMARY CHRONIC PROGRESSIVE (H): Primary | ICD-10-CM

## 2020-01-27 NOTE — TELEPHONE ENCOUNTER
RECORDS RECEIVED FROM: External   Date of Appt: 4/14/20   NOTES (FOR ALL VISITS) STATUS DETAILS   OFFICE NOTE from referring provider Care Everywhere Dr Cruzito Pardo [facility]:  1/20/20 telephone encounter   OFFICE NOTE from other specialist Care Everywhere Dr Lombardi @ [facility]:  1/14/19    Dr Dung Mendes @ [facility]:  4/3/18   DISCHARGE SUMMARY from hospital N/A    DISCHARGE REPORT from the ER N/A    OPERATIVE REPORT N/A    MEDICATION LIST Care Everywhere    IMAGING  (FOR ALL VISITS)     EMG N/A    EEG N/A    MRI (HEAD, NECK, SPINE) In process [facility]:  MRI Cervical Spine 12/12/19  MRI Thoracic Spine 12/12/19  MRI Head 12/12/19    [facility]:  MRI Cervical Spine 3/15/18  MRI Brain 3/15/18  MRI Thoracic Spine 3/15/18   LUMBAR PUNCTURE N/A    JENELLE Scan N/A    CT (HEAD, NECK, SPINE) N/A       Action 1/27/20 MV 2.33pm   Action Taken Imaging request faxed to [facility] for:  MRI Cervical Spine 12/12/19  MRI Thoracic Spine 12/12/19  MRI Head 12/12/19    Imaging request faxed to [facility] for:  MRI Cervical Spine 3/15/18  MRI Brain 3/15/18  MRI Thoracic Spine 3/15/18     Phone Call:  1/27/20 MV 2.48pm   Contact Name Marsha Mcneill   Moshe Spoke with patient and she stated her most current neurology note is in 1/2019. Told patient that if a GALLITO is needed for the [facility] images then I will be in touch with the patient.     Imaging Received  1/28/20 MV 3.20pm  [facility]   Image Type (x): Disc:   PACS: x   Exam Date/Name MRI Cervical Spine 12/12/19  MRI Thoracic Spine 12/12/19  MRI Head 12/12/19 Comments: images resolved in PACS     Action 2/5/20 MV 9.15am   Action Taken 2nd imaging request faxed to [employer] [facility] [facility]

## 2020-01-30 ENCOUNTER — HOSPITAL ENCOUNTER (OUTPATIENT)
Dept: WOUND CARE | Facility: CLINIC | Age: 45
Discharge: HOME | End: 2020-01-30
Attending: PHYSICIAN ASSISTANT | Admitting: PHYSICIAN ASSISTANT
Payer: COMMERCIAL

## 2020-01-30 VITALS
DIASTOLIC BLOOD PRESSURE: 77 MMHG | TEMPERATURE: 96.6 F | SYSTOLIC BLOOD PRESSURE: 115 MMHG | HEART RATE: 66 BPM | RESPIRATION RATE: 19 BRPM

## 2020-01-30 DIAGNOSIS — L89.324 PRESSURE INJURY OF LEFT ISCHIUM, STAGE 4 (H): Primary | ICD-10-CM

## 2020-01-30 LAB — ERYTHROCYTE [SEDIMENTATION RATE] IN BLOOD BY WESTERGREN METHOD: 7 MM/H (ref 0–20)

## 2020-01-30 PROCEDURE — 97602 WOUND(S) CARE NON-SELECTIVE: CPT

## 2020-01-30 PROCEDURE — A6212 FOAM DRG <=16 SQ IN W/BORDER: HCPCS

## 2020-01-30 PROCEDURE — 99203 OFFICE O/P NEW LOW 30 MIN: CPT | Performed by: PHYSICIAN ASSISTANT

## 2020-01-30 PROCEDURE — A6235 HYDROCOLLD DRG >16<=48 W/O B: HCPCS

## 2020-01-30 PROCEDURE — G0463 HOSPITAL OUTPT CLINIC VISIT: HCPCS

## 2020-01-30 PROCEDURE — 85652 RBC SED RATE AUTOMATED: CPT | Performed by: PHYSICIAN ASSISTANT

## 2020-01-30 RX ORDER — FLUOXETINE 40 MG/1
CAPSULE ORAL
COMMUNITY
Start: 2020-01-19 | End: 2020-02-03

## 2020-01-30 RX ORDER — NYSTATIN 100000 U/G
CREAM TOPICAL 2 TIMES DAILY PRN
COMMUNITY
Start: 2020-01-29

## 2020-01-30 RX ORDER — PREGABALIN 100 MG/1
150 CAPSULE ORAL 3 TIMES DAILY
COMMUNITY
Start: 2020-01-16 | End: 2020-08-24

## 2020-01-30 RX ORDER — GENTAMICIN 40 MG/ML
INJECTION, SOLUTION INTRAMUSCULAR; INTRAVENOUS
COMMUNITY
Start: 2020-01-16 | End: 2020-02-03

## 2020-01-30 RX ORDER — PHENOL 1.4 %
600 AEROSOL, SPRAY (ML) MUCOUS MEMBRANE AT BEDTIME
COMMUNITY
Start: 2019-09-26 | End: 2024-06-05

## 2020-01-30 NOTE — PROGRESS NOTES
Patient arrived for wound care visit. Certified Wound Care Nurse time spent evaluating patient record, completed a full evaluation and documented wound(s) & lorenza-wound skin; provided recommendation based on treatment plan. Applied dressing, reviewed discharge instructions, patient education, and discussed plan of care with appropriate medical team staff members and patient and/or family members.

## 2020-02-03 ENCOUNTER — OFFICE VISIT (OUTPATIENT)
Dept: SURGERY | Facility: CLINIC | Age: 45
End: 2020-02-03
Payer: MEDICAID

## 2020-02-03 ENCOUNTER — ANESTHESIA EVENT (OUTPATIENT)
Dept: SURGERY | Facility: CLINIC | Age: 45
End: 2020-02-03

## 2020-02-03 ENCOUNTER — PRE VISIT (OUTPATIENT)
Dept: SURGERY | Facility: CLINIC | Age: 45
End: 2020-02-03

## 2020-02-03 VITALS
HEIGHT: 67 IN | SYSTOLIC BLOOD PRESSURE: 106 MMHG | BODY MASS INDEX: 24.33 KG/M2 | DIASTOLIC BLOOD PRESSURE: 72 MMHG | HEART RATE: 68 BPM | OXYGEN SATURATION: 97 % | RESPIRATION RATE: 19 BRPM | TEMPERATURE: 98.5 F | WEIGHT: 155 LBS

## 2020-02-03 DIAGNOSIS — Z01.818 PRE-OP EXAMINATION: Primary | ICD-10-CM

## 2020-02-03 DIAGNOSIS — Z01.818 PREOP EXAMINATION: Primary | ICD-10-CM

## 2020-02-03 DIAGNOSIS — N83.8 OVARIAN MASS: ICD-10-CM

## 2020-02-03 LAB
ANION GAP SERPL CALCULATED.3IONS-SCNC: 7 MMOL/L (ref 3–14)
BUN SERPL-MCNC: 14 MG/DL (ref 7–30)
CALCIUM SERPL-MCNC: 9.5 MG/DL (ref 8.5–10.1)
CHLORIDE SERPL-SCNC: 109 MMOL/L (ref 94–109)
CO2 SERPL-SCNC: 22 MMOL/L (ref 20–32)
CREAT SERPL-MCNC: 0.46 MG/DL (ref 0.52–1.04)
ERYTHROCYTE [DISTWIDTH] IN BLOOD BY AUTOMATED COUNT: 12.5 % (ref 10–15)
GFR SERPL CREATININE-BSD FRML MDRD: >90 ML/MIN/{1.73_M2}
GLUCOSE SERPL-MCNC: 74 MG/DL (ref 70–99)
HCT VFR BLD AUTO: 43.2 % (ref 35–47)
HGB BLD-MCNC: 13.9 G/DL (ref 11.7–15.7)
MCH RBC QN AUTO: 30 PG (ref 26.5–33)
MCHC RBC AUTO-ENTMCNC: 32.2 G/DL (ref 31.5–36.5)
MCV RBC AUTO: 93 FL (ref 78–100)
NT-PROBNP SERPL-MCNC: 67 PG/ML (ref 0–125)
PLATELET # BLD AUTO: 217 10E9/L (ref 150–450)
POTASSIUM SERPL-SCNC: 4.3 MMOL/L (ref 3.4–5.3)
RBC # BLD AUTO: 4.64 10E12/L (ref 3.8–5.2)
SODIUM SERPL-SCNC: 138 MMOL/L (ref 133–144)
WBC # BLD AUTO: 4.7 10E9/L (ref 4–11)

## 2020-02-03 RX ORDER — CALCIUM CARBONATE 500 MG/1
1-2 TABLET, CHEWABLE ORAL DAILY PRN
COMMUNITY

## 2020-02-03 RX ORDER — PSEUDOEPHEDRINE HCL 30 MG
60 TABLET ORAL EVERY 4 HOURS PRN
COMMUNITY
End: 2020-04-23

## 2020-02-03 ASSESSMENT — ENCOUNTER SYMPTOMS: SEIZURES: 0

## 2020-02-03 ASSESSMENT — PAIN SCALES - GENERAL: PAINLEVEL: MODERATE PAIN (5)

## 2020-02-03 ASSESSMENT — LIFESTYLE VARIABLES: TOBACCO_USE: 0

## 2020-02-03 ASSESSMENT — MIFFLIN-ST. JEOR: SCORE: 1390.71

## 2020-02-03 NOTE — ANESTHESIA PREPROCEDURE EVALUATION
Anesthesia Pre-Procedure Evaluation    Patient: Marsha Mcneill   MRN:     [id number] Gender:   female   Age:    44 year old :      1975        Preoperative Diagnosis: * No surgery found *        Past Medical History:   Diagnosis Date     Anxiety      Chronic pain      Depression      History of DVT (deep vein thrombosis)      Ileostomy status (H)      Insomnia      MRSA (methicillin resistant staph aureus) culture positive      Multiple scleroses      Neurological disorders      Pressure sore      Urinary tract infection       Past Surgical History:   Procedure Laterality Date     BLADDER AUGMENTATION  2012    Procedure: BLADDER AUGMENTATION;  APPENDECTOMY, BLADDER AUGMENTATION, HUMBERTO BLADDER STOMA;  Surgeon: Errol Westbrook MD;  Location: [facility] OR     ILEOSTOMY  2018     INSERT PUMP BACLOFEN  2009    2 revisions     LAPAROTOMY EXPLORATORY  2012    Procedure: LAPAROTOMY EXPLORATORY;  Removal of Inter Abdominal Drain;  Surgeon: Errol Westbrook MD;  Location: [facility] OR     LAPAROTOMY EXPLORATORY      ileostomy fro diversion of obstipation     MITROFANOFF PROCEDURE (APPENDIX CONDUIT)  2012    Procedure: MITROFANOFF PROCEDURE (APPENDIX CONDUIT);;  Surgeon: Errol Westbrook MD;  Location: [facility] OR          Anesthesia Evaluation     . Pt has had prior anesthetic. Type: General    History of anesthetic complications    slow to wake       ROS/MED HX    ENT/Pulmonary:  - neg pulmonary ROS    (-) tobacco use   Neurologic:     (+)Multiple Sclerosis (patient has baclofen pump) limitations: wheelchair bound, has slight movement in left hand,    (-) seizures, CVA and TIA   Cardiovascular: Comment: Patient has lower extremity edema.     (+) ----. : . . . :. . Previous cardiac testing date:results:date: results:ECG reviewed date:19 results:NSR date: results:          METS/Exercise Tolerance:  1 - Eating, dressing   Hematologic:     (+) History of blood clots (patient had subclavian thrombosis in  2012) pt is not anticoagulated, -     (-) anemia and History of Transfusion   Musculoskeletal:   (+)  other musculoskeletal- pressure sore/osteomyelitis      GI/Hepatic: Comment: S/p ileostomy 2018 for neurogenic bowel       (-) GERD   Renal/Genitourinary: Comment: S/p ileocystoplasty with Erik 2012    Neurogenic bladder    Ovarian mass    (+) Other Renal/ Genitourinary, history of recurrent UTIs      Endo:  - neg endo ROS       Psychiatric:     (+) psychiatric history anxiety, other (comment) and depression (insomnia)      Infectious Disease:   (+) MRSA,       Malignancy:         Other: Comment: Rheumatoid arthritis    (+) H/O Chronic Pain,H/O chronic opiod use , other significant disability Wheelchair bound                       PHYSICAL EXAM:   Mental Status/Neuro: A/A/O; Age Appropriate   Airway: Facies: Feasible  Mallampati: II  Mouth/Opening: Full  TM distance: > 6 cm  Neck ROM: Not Assessed   Respiratory: Auscultation: CTAB     Resp. Rate: Normal     Resp. Effort: Normal      CV: Rhythm: Regular  Edema: LLE; RLE  Pulses: Normal   Comments: Unable to assess neck ROM as patient has no muscle control secondary to MS.                      Results for KIMBERLEY BARCLAY (MRN [id number]) as of 2/3/2020 12:40   Ref. Range 2/3/2020 12:04   Sodium Latest Ref Range: 133 - 144 mmol/L 138   Potassium Latest Ref Range: 3.4 - 5.3 mmol/L 4.3   Chloride Latest Ref Range: 94 - 109 mmol/L 109   Carbon Dioxide Latest Ref Range: 20 - 32 mmol/L 22   Urea Nitrogen Latest Ref Range: 7 - 30 mg/dL 14   Creatinine Latest Ref Range: 0.52 - 1.04 mg/dL 0.46 (L)   GFR Estimate Latest Ref Range: >60 mL/min/1.73_m2 >90   GFR Estimate If Black Latest Ref Range: >60 mL/min/1.73_m2 >90   Calcium Latest Ref Range: 8.5 - 10.1 mg/dL 9.5   Anion Gap Latest Ref Range: 3 - 14 mmol/L 7   N-Terminal Pro Bnp Latest Ref Range: 0 - 125 pg/mL 67   Glucose Latest Ref Range: 70 - 99 mg/dL 74   WBC Latest Ref Range: 4.0 - 11.0 10e9/L 4.7   Hemoglobin Latest Ref  "Range: 11.7 - 15.7 g/dL 13.9   Hematocrit Latest Ref Range: 35.0 - 47.0 % 43.2   Platelet Count Latest Ref Range: 150 - 450 10e9/L 217   RBC Count Latest Ref Range: 3.8 - 5.2 10e12/L 4.64   MCV Latest Ref Range: 78 - 100 fl 93   MCH Latest Ref Range: 26.5 - 33.0 pg 30.0   MCHC Latest Ref Range: 31.5 - 36.5 g/dL 32.2   RDW Latest Ref Range: 10.0 - 15.0 % 12.5     Preop Vitals    BP Readings from Last 3 Encounters:   02/03/20 106/72   01/30/20 115/77   01/13/20 100/68    Pulse Readings from Last 3 Encounters:   02/03/20 68   01/30/20 66   01/13/20 76      Resp Readings from Last 3 Encounters:   02/03/20 19   01/30/20 19   01/13/20 14    SpO2 Readings from Last 3 Encounters:   02/03/20 97%   01/13/20 98%   12/24/12 95%      Temp Readings from Last 1 Encounters:   02/03/20 98.5  F (36.9  C) (Oral)    Ht Readings from Last 1 Encounters:   02/03/20 1.702 m (5' 7\")      Wt Readings from Last 1 Encounters:   02/03/20 70.3 kg (155 lb)    Estimated body mass index is 24.28 kg/m  as calculated from the following:    Height as of this encounter: 1.702 m (5' 7\").    Weight as of this encounter: 70.3 kg (155 lb).     LDA:  Suprapubic Catheter Non-latex 20 fr (Active)   Number of days: 2614       Suprapubic Catheter Non-latex 14 fr (Active)   Number of days: 2614        JZG FV AN PLAN NO PONV RULE       PAC Discussion and Assessment    ASA Classification: 3  Case is suitable for: [place]  Anesthetic techniques and relevant risks discussed: PAC Recommendations anesthetic techniques: choice.  Invasive monitoring and risk discussed:   Types:   Possibility and Risk of blood transfusion discussed:   NPO instructions given:   Additional anesthetic preparation and risks discussed:   Needs early admission to pre-op area:   Other:     PAC Resident/NP Anesthesia Assessment:  Marsha is a 44 year old woman who is scheduled for Laparotomy, removal of pelvic mass, both fallopian tubes, possible cancer operation. on 2/26/20 by Dr. Barron and " Dr. Hernandez in treatment of ovarian mass.  PAC referral for risk assessment and optimization for anesthesia with comorbid conditions of lower extremity edema, history of DVT, multiple sclerosis, s/p ileostomy, neurogenic bladder s/p ileocystoplasty with Erik, history of recurrent UTIs, MRSA, anxiety, depression, insomnia, chronic pain, history of pressure sore/osteomyelitis:    Pre-operative considerations:  1.  Cardiac:  Functional status- METS 1, patient secondary to MS.  High risk surgery with 0.9% (RCRI #) risk of major adverse cardiac event. The patient had EKG on 5/6/19 showing normal sinus rhythm. Will get baseline BNP given the patient's limited METS secondary to MS.   ~ Lower extremity edema - the patient has cotton wraps placed as needed.     2.  Pulm:  Airway feasible.  LUCY risk: Low  ~ Allergies - the patient uses sudafed as needed.     3. Heme:  History of subclavian thrombosis in 2012. She is not on any anticoagulation.   ~ The patient has history of difficult IV access. She has a PORT in place but this is clotting and unable to be used.     4. Neuro: Multiple Sclerosis - the patient is transported in [facility] today. She will continue baclofen pump, valium as needed.      5. GI:  Risk of PONV score =3.  If > 2, anti-emetic intervention recommended.  ~ neurogenic bowel s/p ileostomy. The patient's bag is emptied every 2 hours. She has no concerns with high output.     6. : neurogenic bladder - the patient is s/ ileocystoplasty with erik - she has issues with recurrent UTIs - She completed a course of bactrim for UTI/osteomyelitis today. Continue ditropan.     7. ID: History of MRSA in pressure sore - contact precautions as needed.     8. Psych: Anxiety, depression, insomnia - the patient follows with Lionel LINARES-GARY and last seen on 1/22/20. She will continue prozac, lyrica, trazodone, doxepin.     9. Musculoskeletal: History of pressure sore/osteomyelitis - the patient is s/p irrigation and  debridement of the wound. She completed her vancomyocin course. She continues to have pain in the area. Recommend careful positioning and padding.   ~ Chronic pain/Rheumatoid arthritis - continue PRN oxycodone, tylenol, ibuprofen. The patient was seen by PharmD as a part of today's visit. Please see their note for post op pain plan.     VTE risk: 0.26%-1.8%    Patient is optimized and is acceptable candidate for the proposed procedure.  No further diagnostic evaluation is needed.     Patient also evaluated by Dr Lee. See recommendations below.     For further details of assessment, testing, and physical exam please see H and P completed on same date.    Daxa Mc PA-C          Mid-Level Provider/Resident: Daxa Mc PA-C  Date: 2/3/20  Time:     Attending Anesthesiologist Anesthesia Assessment:  I have examined the patient and reviewed the medical record.  I have discussed the patient with the LULA and concur with her assessment  The patient is scheduled for laparotomy for ovarian mass removal  The mass was discovered incidentally on MRI to evaluate osteomyelitis of sacrum secondary to decubitus ulcer  The patient has progressive MS and is WC bound with limited ability to even sit in an upright postiion.  Her current therapy includes rituximab and a baclofen pump  She has not been on steroids for many years per patient  She has neurogenic bladder and neurogenic bowel.  She had bladder augmentation 2012 and ileostomy 2019.  She reports difficulty awakening from anesthesia but has never had a problem with post operative respiratory dysfunction.  She reports no swallowing, dysphagia or respiratory complications with her MS.  She denies cardiac symptoms but has limited exercise potential    PE:  Very pleasant, obese female in NAD.  IN WC in semirecumbent position.  Able to extend head and neck well without difficulty.  MPC 3-4 with large tongue  Six cm TMD.  Lungs clear  CV  RRR without murmur.  RR 19  SpO2  97%  No apparent dyspnea    Patient will be at risk for post operative respiratory compromise.  Discussed possibly remaining intubated or transitioning to BiPAP in PACU.  Will check BNP but patient is low risk for cardiac disease  Final plan per attending anesthesiologist the day of surgery.      Reviewed and Signed by PAC Anesthesiologist  Anesthesiologist: Glynn Lee MD  Date: 2/3/2020  Time:   Pass/Fail:   Disposition:     PAC Pharmacist Assessment:        Pharmacist:   Date:   Time:    Daxa Mc PA-C

## 2020-02-03 NOTE — PATIENT INSTRUCTIONS
Preparing for Your Surgery      Name:  Marsha Mcneill   MRN:  [id number]   :  1975   Today's Date:  2/3/2020     Arriving for surgery:  Surgery date:  2020  Arrival time:  5:30AM  Please come to:       [facility] [address]  [address] [place]  [address]    - ? parking is available in front of the hospital      -    Please proceed to [address] on the 3rd floor. [phone]?     - ?If you are in need of directions, wheelchair or escort please stop at the Information Desk in the lobby.  Inform the information person that you are here for surgery; a wheelchair and escort to [address] will be provided.?     What can I eat or drink?  -  You may have solid food or milk products until 8 hours prior to your surgery. 2020, 11:30PM  -  You may have water, apple juice or 7up/Sprite until 2 hours prior to your surgery. 2020, 5:30AM    Which medicines can I take?  Stop Aspirin, Multivitamins and supplements one week prior to surgery.  Hold Ibuprofen for 24 hours and/or Naproxen for 48 hours prior to surgery.   -  Do NOT take these medications in the morning, the day of surgery:    Tums    Pseudoephedrine(Sudafed)    -  Please take these medications the day of surgery:    Acetaminophen(Tylenol)    Baclofen(Lioresal) as needed    Diazepam(Valium) as needed   Nystatin(Mycostatin) cream as needed     Odansetron(Zofran) as needed   Oxycodone(Roxicodone) as needed    Pregabalin(Lyrica)     Bactrim    How do I prepare myself?  -  Take two showers: one the night before surgery; and one the morning of surgery.         Use Scrubcare or Hibiclens to wash from neck down, leave soap on your skin for up to one minute.  Do not get soap in your eyes or ears.  You may use your own shampoo and conditioner; no other hair products.   -  Do NOT use lotion, powder, deodorant, or antiperspirant the day of your surgery.  -  Do NOT wear any makeup, fingernail polish or jewelry.  - Do not  bring your own medications to the hospital, except for inhalers and eye   drops.  -  Bring your ID and insurance card.    Questions or Concerns:  -If you are scheduled on the [facility] or [facility] campus and have questions or concerns regarding the day of surgery, please call Preadmission Nursing at [phone].     -If you have health changes between today and your surgery please call your surgeon. For questions after surgery please call your surgeons office.           AFTER YOUR SURGERY  Breathing exercises   Breathing exercises help you recover faster. Take deep breaths and let the air out slowly. This will:     Help you wake up after surgery.    Help prevent complications like pneumonia.  Preventing complications will help you go home sooner.   We may give you a breathing device (incentive spirometer) to encourage you to breathe deeply.   Nausea and vomiting   You may feel sick to your stomach after surgery; if so, let your nurse know.    Pain control:  After surgery, you may have pain. Our goal is to help you manage your pain. Pain medicine will help you feel comfortable enough to do activities that will help you heal.  These activities may include breathing exercises, walking and physical therapy.   To help your health care team treat your pain we will ask: 1) If you have pain  2) where it is located 3) describe your pain in your words  Methods of pain control include medications given by mouth, vein or by nerve block for some surgeries.  Sequential Compression Device (SCD):  You may need to wear SCD S (also called pneumo boots)on your legs or feet. These are wraps connected to a machine that pumps in air and releases it. The repeated pumping helps prevent blood clots from forming.

## 2020-02-03 NOTE — H&P
Pre-Operative H & P     CC:  Preoperative exam to assess for increased cardiopulmonary risk while undergoing surgery and anesthesia.    Date of Encounter: 2/3/2020  Primary Care Physician:  Maddi Mohr     Reason for visit: pre operative examination, ovarian mass     HPI  Marsha Mcneill is a 44 year old female who presents for pre-operative H & P in preparation for Laparotomy, removal of pelvic mass, both fallopian tubes, possible cancer operation. with Dr. Barron and Dr. Hernandez on 2/26/20 at [facility].     Patient is a 44-year-old woman with a past medical history significant for MS, history of subclavian DVT in 2012, neurogenic bowel with ileostomy, neurogenic bladder with ileocystoplasty with Erik, recurrent UTIs, rheumatoid arthritis, history of pressure sores/osteomyelitis, MRSA, anxiety, depression, insomnia, and chronic pain.  She has been having more difficulty with her catheterization of her Sid and thus sought medical attention.  She had an ultrasound and then MRI which showed an ovarian mass causing compression of her Erik.  She met with Dr. Barron on 1/13/2020 just discuss this new finding.  Given her history of multiple  sclerosis causing very limited restriction she was referred to our clinic.  She also followed up with Dr. Westbrook on 1/13/2020 given her surgical history with  the Erik for planning of a combined case. The patient is now scheduled for the procedure as above.     History is obtained from the patient and chart review    Past Medical History  Past Medical History:   Diagnosis Date     Anxiety      Chronic pain      Depression      History of DVT (deep vein thrombosis)      Ileostomy status (H)      Insomnia      MRSA (methicillin resistant staph aureus) culture positive      Multiple scleroses      Neurological disorders      Pressure sore      Urinary tract infection        Past Surgical History  Past Surgical History:   Procedure  Laterality Date     BLADDER AUGMENTATION  12/7/2012    Procedure: BLADDER AUGMENTATION;  APPENDECTOMY, BLADDER AUGMENTATION, HUMBERTO BLADDER STOMA;  Surgeon: Errol Westbrook MD;  Location: [facility] OR     ILEOSTOMY  2018     INSERT PUMP BACLOFEN  2009    2 revisions     IRRIGATION AND DEBRIDEMENT DECUBITUS WOUND, COMBINED  09/2019     LAPAROTOMY EXPLORATORY  12/20/2012    Procedure: LAPAROTOMY EXPLORATORY;  Removal of Inter Abdominal Drain;  Surgeon: Errol Westbrook MD;  Location: [facility] OR     LAPAROTOMY EXPLORATORY      ileostomy fro diversion of obstipation     MITROFANOFF PROCEDURE (APPENDIX CONDUIT)  12/7/2012    Procedure: MITROFANOFF PROCEDURE (APPENDIX CONDUIT);;  Surgeon: Errol Westbrook MD;  Location: [facility] OR       Hx of Blood transfusions/reactions: denies     Hx of abnormal bleeding or anti-platelet use: none    Menstrual history: No LMP recorded. (Menstrual status: Amenorrhea).:     Steroid use in the last year: none    Personal or FH with difficulty with Anesthesia:  Slow to wake    Prior to Admission Medications  Current Outpatient Medications   Medication Sig Dispense Refill     acetaminophen (TYLENOL) 500 MG tablet Take 1,000 mg by mouth 3 times daily        baclofen (GABLOFEN) 18721 MCG/20ML injection As of February 3, 2020   Drug: Baclofen 2000 mcg/mL   Rate (simple continuous rate): 459.5 mcg/day   Low reservoir alarm date: 4/16/20  Managed by Dr. Martinez       baclofen (LIORESAL) 10 MG tablet Take 20 mg by mouth 3 times daily as needed        calcium carbonate (OS-LILIBETH 600 MG [place]. [place]) 600 MG tablet Take 600 mg by mouth At Bedtime        calcium carbonate (TUMS) 500 MG chewable tablet Take 1-2 chew tab by mouth daily as needed for heartburn       diazepam (VALIUM) 2 MG tablet Take 2 mg by mouth 2 times daily as needed for muscle spasms        doxepin (SINEQUAN) 25 MG capsule TAKE 1 CAPSULE BY MOUTH DAILY AT BEDTIME  99     FLUOXETINE HCL PO Take 60 mg by mouth At Bedtime       ibuprofen  (ADVIL/MOTRIN) 800 MG tablet TAKE 1 TABLET BY MOUTH TWICE A DAY AS NEEDED  99     medroxyPROGESTERone (DEPO-PROVERA) 150 MG/ML injection Inject 150 mg into the muscle every 3 months.       morphine 0.1% in solosite gel Apply 1 gm to wound bed daily with dressing changes for one month (Patient not taking: Reported on 2/3/2020) 30 g 0     NEW  mg gentamicin in 1 Liter 0.9 Normal Saline.  Instill 30 ml of gentamicin solution into bladder at HS. 900 mL 11     nystatin (MYCOSTATIN) 027737 UNIT/GM external cream 2 times daily as needed        ondansetron (ZOFRAN) 4 MG tablet TAKE 1 TABLET BY MOUTH EVERY 4 HOURS AS NEEDED  99     oxybutynin ER (DITROPAN XL) 15 MG 24 hr tablet TAKE 1 TAB BY MOUTH AT BEDTIME  99     oxyCODONE (ROXICODONE) 5 MG tablet TAKE 1 TO 2 TABLETS BY MOUTH EVERY 4 HOURS AS NEEDED FOR SEVERE PAIN.       pregabalin (LYRICA) 100 MG capsule TAKE 1 CAP BY MOUTH THREE TIMES DAILY       pseudoePHEDrine (SUDAFED) 30 MG tablet Take 60 mg by mouth every 4 hours as needed for congestion       sulfamethoxazole-trimethoprim (BACTRIM DS/SEPTRA DS) 800-160 MG tablet Take 1 tablet by mouth 2 times daily   0     traZODone (DESYREL) 100 MG tablet TAKE 1 TAB BY MOUTH AT BEDTIME  99       Allergies  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     Macrobid [Nitrofurantoin Anhydrous] Nausea and Vomiting       Social History  Social History     Socioeconomic History     Marital status:      Spouse name: Not on file     Number of children: Not on file     Years of education: Not on file     Highest education level: Not on file   Occupational History     Not on file   Social Needs     Financial resource strain: Not on file     Food insecurity:     Worry: Not on file     Inability: Not on  file     Transportation needs:     Medical: Not on file     Non-medical: Not on file   Tobacco Use     Smoking status: Never Smoker     Smokeless tobacco: Never Used   Substance and Sexual Activity     Alcohol use: No     Drug use: No     Sexual activity: Not on file   Lifestyle     Physical activity:     Days per week: Not on file     Minutes per session: Not on file     Stress: Not on file   Relationships     Social connections:     Talks on phone: Not on file     Gets together: Not on file     Attends Muslim service: Not on file     Active member of club or organization: Not on file     Attends meetings of clubs or organizations: Not on file     Relationship status: Not on file     Intimate partner violence:     Fear of current or ex partner: Not on file     Emotionally abused: Not on file     Physically abused: Not on file     Forced sexual activity: Not on file   Other Topics Concern     Parent/sibling w/ CABG, MI or angioplasty before 65F 55M? Not Asked   Social History Narrative     Not on file       Family History  Family History   Problem Relation Age of Onset     Cancer Mother         non hodgkin's lymphoma      Diabetes Mother      Diabetes Maternal Grandfather      Cardiovascular Maternal Grandfather      Cardiovascular Father        Review of Systems    ROS/MED HX    ENT/Pulmonary:  - neg pulmonary ROS    (-) tobacco use   Neurologic:     (+)Multiple Sclerosis (patient has baclofen pump) limitations: wheelchair bound, has slight movement in left hand,    (-) seizures, CVA and TIA   Cardiovascular: Comment: Patient has lower extremity edema.     (+) ----. : . . . :. . Previous cardiac testing date:results:date: results:ECG reviewed date:5/6/19 results:NSR date: results:          METS/Exercise Tolerance:  1 - Eating, dressing   Hematologic:     (+) History of blood clots (patient had subclavian thrombosis in 2012) pt is not anticoagulated, -     (-) anemia and History of Transfusion   Musculoskeletal:   " (+)  other musculoskeletal- pressure sore/osteomyelitis      GI/Hepatic: Comment: S/p ileostomy 2018 for neurogenic bowel       (-) GERD   Renal/Genitourinary: Comment: S/p ileocystoplasty with Erik 2012    Neurogenic bladder    Ovarian mass    (+) Other Renal/ Genitourinary, history of recurrent UTIs      Endo:  - neg endo ROS       Psychiatric:     (+) psychiatric history anxiety, other (comment) and depression (insomnia)      Infectious Disease:   (+) MRSA,       Malignancy:         Other: Comment: Rheumatoid arthritis    (+) H/O Chronic Pain,H/O chronic opiod use , other significant disability Wheelchair bound         The complete review of systems is negative other than noted in the HPI or here.   Temp: 98.5  F (36.9  C) Temp src: Oral BP: 106/72 Pulse: 68   Resp: 19 SpO2: 97 %         155 lbs 0 oz  5' 7\"   Body mass index is 24.28 kg/m .       Physical Exam  Constitutional: Awake, alert, cooperative, no apparent distress, and appears stated age.  Eyes: Pupils equal, round and reactive to light, extra ocular muscles intact, sclera clear, conjunctiva normal.  HENT: Normocephalic, oral pharynx with moist mucus membranes, good dentition. No goiter appreciated.   Respiratory: Clear to auscultation bilaterally, no crackles or wheezing.  Cardiovascular: Regular rate and rhythm, normal S1 and S2, and no murmur noted.  Carotids +2, no bruits. No edema. Palpable pulses to radial  DP and PT arteries.   GI: Normal bowel sounds, soft, non-distended, non-tender, no masses palpated, no hepatosplenomegaly.  Surgical scars: well healed. Ileostomy in place in opaque bag.   Lymph/Hematologic: No cervical lymphadenopathy and no supraclavicular lymphadenopathy.  Genitourinary:  Slight leakage from Erik at umbilicus.   Skin: Warm and dry.  No rashes at anticipated surgical site.   Musculoskeletal: Multiple sclerosis - patient able to move left arm    Neurologic: Awake, alert, oriented to name, place and time. Cranial nerves " II-XII are grossly intact.    Neuropsychiatric: Calm, cooperative. Normal affect.     Labs: (personally reviewed)  Results for KIMBERLEY BARCLAY (MRN [id number]) as of 2/3/2020 12:40   Ref. Range 2/3/2020 12:04   Sodium Latest Ref Range: 133 - 144 mmol/L 138   Potassium Latest Ref Range: 3.4 - 5.3 mmol/L 4.3   Chloride Latest Ref Range: 94 - 109 mmol/L 109   Carbon Dioxide Latest Ref Range: 20 - 32 mmol/L 22   Urea Nitrogen Latest Ref Range: 7 - 30 mg/dL 14   Creatinine Latest Ref Range: 0.52 - 1.04 mg/dL 0.46 (L)   GFR Estimate Latest Ref Range: >60 mL/min/1.73_m2 >90   GFR Estimate If Black Latest Ref Range: >60 mL/min/1.73_m2 >90   Calcium Latest Ref Range: 8.5 - 10.1 mg/dL 9.5   Anion Gap Latest Ref Range: 3 - 14 mmol/L 7   N-Terminal Pro Bnp Latest Ref Range: 0 - 125 pg/mL 67   Glucose Latest Ref Range: 70 - 99 mg/dL 74   WBC Latest Ref Range: 4.0 - 11.0 10e9/L 4.7   Hemoglobin Latest Ref Range: 11.7 - 15.7 g/dL 13.9   Hematocrit Latest Ref Range: 35.0 - 47.0 % 43.2   Platelet Count Latest Ref Range: 150 - 450 10e9/L 217   RBC Count Latest Ref Range: 3.8 - 5.2 10e12/L 4.64   MCV Latest Ref Range: 78 - 100 fl 93   MCH Latest Ref Range: 26.5 - 33.0 pg 30.0   MCHC Latest Ref Range: 31.5 - 36.5 g/dL 32.2   RDW Latest Ref Range: 10.0 - 15.0 % 12.5     EK19  Sinus rhythm    The patient's records and results personally reviewed by this provider.     Outside records reviewed from: care everywhere     ASSESSMENT and PLAN  Kimberley is a 44 year old woman who is scheduled for Laparotomy, removal of pelvic mass, both fallopian tubes, possible cancer operation. on 20 by Dr. Barron and Dr. Hernandez in treatment of ovarian mass.  PAC referral for risk assessment and optimization for anesthesia with comorbid conditions of lower extremity edema, history of DVT, multiple sclerosis, s/p ileostomy, neurogenic bladder s/p ileocystoplasty with Erik, history of recurrent UTIs, MRSA, anxiety, depression, insomnia, chronic pain,  history of pressure sore/osteomyelitis:    Pre-operative considerations:  1.  Cardiac:  Functional status- METS 1, patient secondary to MS.  High risk surgery with 0.9% (RCRI #) risk of major adverse cardiac event. The patient had EKG on 5/6/19 showing normal sinus rhythm. Will get baseline BNP given the patient's limited METS secondary to MS.   ~ Lower extremity edema - the patient has cotton wraps placed as needed.     2.  Pulm:  Airway feasible.  LUCY risk: Low  ~ Allergies - the patient uses sudafed as needed.     3. Heme:  History of subclavian thrombosis in 2012. She is not on any anticoagulation.   ~ The patient has history of difficult IV access. She has a PORT in place but this is clotting and unable to be used.     4. Neuro: Multiple Sclerosis - the patient is transported in [facility] today. She will continue baclofen pump, valium as needed.      5. GI:  Risk of PONV score =3.  If > 2, anti-emetic intervention recommended.  ~ neurogenic bowel s/p ileostomy. The patient's bag is emptied every 2 hours. She has no concerns with high output.     6. : neurogenic bladder - the patient is s/ ileocystoplasty with zahra - she has issues with recurrent UTIs - She completed a course of bactrim for UTI/osteomyelitis today. Continue ditropan.     7. ID: History of MRSA in pressure sore - contact precautions as needed.     8. Psych: Anxiety, depression, insomnia - the patient follows with [facility] and last seen on 1/22/20. She will continue prozac, lyrica, trazodone, doxepin.     9. Musculoskeletal: History of pressure sore/osteomyelitis - the patient is s/p irrigation and debridement of the wound. She completed her vancomyocin course. She continues to have pain in the area. She was recently seen by Bethany Moya PA-C for her wound care on 1/30/20. Recommend careful positioning and padding.   ~ Chronic pain/Rheumatoid arthritis - continue PRN oxycodone, tylenol, ibuprofen. The patient was seen by PharmD as a  part of today's visit. Please see their note for post op pain plan.     VTE risk: 0.26%-1.8%    Patient was discussed with Dr Lee.    The patient is optimized for their procedure. AVS with information on surgery time/arrival time, meds and NPO status given by nursing staff.      Daxa Mc PA-C  Preoperative Assessment Center  [facility]  Clinic and Surgery Center  Phone: [phone]  Fax: [phone]

## 2020-02-03 NOTE — PROGRESS NOTES
Preoperative Assessment Center medication history for February 3, 2020 is complete.    See Epic admission navigator for prior to admission medications.   Operating room staff will still need to confirm medications and last dose information on day of surgery.     Medication history interview sources:  patient, payor information, IT pump interrogation, med list from outside facility. Care everywhere.     Changes made to PTA medication list (reason)  Added: tums, pseudoephedrine,   Deleted: kefflex, buspirone, calcium/vit D, citalopram, vitamin D, florastor, gabapentin, heparin, miconazole, prosource hol[place], NS flush. Duplicate gentamicin, oxybutinyn IR,   Changed: sig: calcium nystatin.   Sig/dose on: baclofen, acetaminophen, fluoxetine, valium, bactrim, gentamicin irrigation. Baclofen pump interrogated and updated dosing information.     Additional medication history information (including reliability of information, actions taken by pharmacist):    -- Bactrim DS - completing a course for UTI by tomorrow  -- No recent (within 30 days) course of steroids  -- No recent (within 30 days) chronic daily medications stopped   -- Patient declines being on any other prescription or over-the-counter medications      Prior to Admission medications    Medication Sig Last Dose Taking? Auth Provider   acetaminophen (TYLENOL) 500 MG tablet Take 1,000 mg by mouth 3 times daily  Taking Yes Reported, Patient   baclofen (LIORESAL) 10 MG tablet Take 20 mg by mouth 3 times daily as needed  Taking Yes Reported, Patient   calcium carbonate (OS-LILIBETH 600 MG [place]. [place]) 600 MG tablet Take 600 mg by mouth At Bedtime  Taking Yes Reported, Patient   calcium carbonate (TUMS) 500 MG chewable tablet Take 1-2 chew tab by mouth daily as needed for heartburn Taking Yes Unknown, Entered By History   diazepam (VALIUM) 2 MG tablet Take 2 mg by mouth 2 times daily as needed for muscle spasms  Taking Yes Reported, Patient   doxepin (SINEQUAN) 25 MG capsule TAKE  1 CAPSULE BY MOUTH DAILY AT BEDTIME Taking Yes Reported, Patient   FLUOXETINE HCL PO Take 60 mg by mouth At Bedtime Taking Yes Unknown, Entered By History   ibuprofen (ADVIL/MOTRIN) 800 MG tablet TAKE 1 TABLET BY MOUTH TWICE A DAY AS NEEDED Taking Yes Reported, Patient   medroxyPROGESTERone (DEPO-PROVERA) 150 MG/ML injection Inject 150 mg into the muscle every 3 months. Taking Yes Reported, Patient   NEW  mg gentamicin in 1 Liter 0.9 Normal Saline.  Instill 30 ml of gentamicin solution into bladder at HS. Taking Yes Errol Westbrook MD   nystatin (MYCOSTATIN) 362224 UNIT/GM external cream 2 times daily as needed  Taking Yes Reported, Patient   oxybutynin ER (DITROPAN XL) 15 MG 24 hr tablet TAKE 1 TAB BY MOUTH AT BEDTIME Taking Yes Reported, Patient   oxyCODONE (ROXICODONE) 5 MG tablet TAKE 1 TO 2 TABLETS BY MOUTH EVERY 4 HOURS AS NEEDED FOR SEVERE PAIN. Taking Yes Reported, Patient   pregabalin (LYRICA) 100 MG capsule TAKE 1 CAP BY MOUTH THREE TIMES DAILY Taking Yes Reported, Patient   pseudoePHEDrine (SUDAFED) 30 MG tablet Take 60 mg by mouth every 4 hours as needed for congestion Taking Yes Unknown, Entered By History   sulfamethoxazole-trimethoprim (BACTRIM DS/SEPTRA DS) 800-160 MG tablet Take 1 tablet by mouth 2 times daily  Taking Yes Reported, Patient   traZODone (DESYREL) 100 MG tablet TAKE 1 TAB BY MOUTH AT BEDTIME Taking Yes Reported, Patient   baclofen (GABLOFEN) 44243 MCG/20ML injection 19.1458 mcg/hr by Intrathecal route   Reported, Patient   morphine 0.1% in solosite gel Apply 1 gm to wound bed daily with dressing changes for one month  Patient not taking: Reported on 2/3/2020 Not Taking  Bethany Moya PA-C   ondansetron (ZOFRAN) 4 MG tablet TAKE 1 TABLET BY MOUTH EVERY 4 HOURS AS NEEDED Not Taking  Reported, Patient        Medication history completed by: Moses Williamson, [facility]

## 2020-02-04 ENCOUNTER — TELEPHONE (OUTPATIENT)
Dept: WOUND CARE | Facility: CLINIC | Age: 45
End: 2020-02-04

## 2020-02-04 NOTE — TELEPHONE ENCOUNTER
Called and spoke to Marsha to requested that our office fax a prescription to [facility] for Vitamin D and to clarify how often the dressing should be changed. [facility] has not received the Morphine gel yet so they have not changed the dressing since 1-. New orders faxed to [facility] to do dressings daily even though the Morphine gel has not arrived and Vit D prescription also faxed.

## 2020-02-04 NOTE — TELEPHONE ENCOUNTER
Patient has several questions regarding last weeks visit, including clarification of orders, lab request, etc.

## 2020-02-04 NOTE — ADDENDUM NOTE
Encounter addended by: Amy Carlos RN on: 2/4/2020 1:22 PM   Actions taken: Edited Discharge Instructions, Order list changed, Diagnosis association updated

## 2020-02-06 ENCOUNTER — TELEPHONE (OUTPATIENT)
Dept: WOUND CARE | Facility: CLINIC | Age: 45
End: 2020-02-06

## 2020-02-06 NOTE — TELEPHONE ENCOUNTER
Spoke to [facility] to inquire if they have received our Morphine gel prescription that we mailed to them on 1-. They have no record of the rx on file. I contacted [facility]  service who will  a new rx from our office and hand carry to [facility]. I called Nona and gave her an update.

## 2020-02-06 NOTE — ADDENDUM NOTE
Encounter addended by: Amy Carlos RN on: 2/6/2020 11:49 AM   Actions taken: Pharmacy for encounter modified, Order list changed, Diagnosis association updated

## 2020-02-07 ENCOUNTER — TELEPHONE (OUTPATIENT)
Dept: WOUND CARE | Facility: CLINIC | Age: 45
End: 2020-02-07

## 2020-02-07 NOTE — TELEPHONE ENCOUNTER
PA Initiation    Medication: Morphine 0.1 % in solosite gel (compound)  Insurance Company: [place] Medicaid ([facility]) - Phone [phone] Fax [phone]  Pharmacy Filling the Rx: [facility] - - [place] - [address]  Filling Pharmacy Phone: [phone]  Filling Pharmacy Fax:    Start Date: 2/7/2020    Central Prior Authorization Team   Phone: [phone]

## 2020-02-07 NOTE — TELEPHONE ENCOUNTER
A prior authorization is needed for the following compounded medications prescribed.  Please complete a prior authorization with the information included below.    Medication:Morphine 0.1 % in solosite gel (compound)    Ingredients                                                                  NDCs                                                Quantities                       Morphine Sulf Powd                                                   [address]-40                                                 0.03g  Solosite Wound gel                                                 [address]-40                                              29.970 g                                                                                                    RX #:[id number]  Reason for Rejection:NDC not covered-- NDC is not Eligible for PA initial Fill Opiate Cannot exceed seven days --    Patient was able to get a total of 7 grams out of the 30 grams it was written for with this restriction- The remaining 23 grams has/will be voided (CII)    We wanted to see if it's possible to get a new RX for 30 grams, and to get an override for more then 7 days per fill.    Pharmacy Insurance plan:[place] MED  BIN #:[id number]  ID #:[id number]  PCN #:n/[id number]  Phone #:[phone]      Pharmacy NPI:[id number]      Please advise the Compounding Pharmacy @ [phone] when the prior authorization is approved or denied.     Thank you for your time.        Rush Almonte  Compounding Pharmacy Technician  [facility]   [address] [place] [id number]   Phone: [phone]  Fax: [phone]

## 2020-02-10 ENCOUNTER — TELEPHONE (OUTPATIENT)
Dept: ONCOLOGY | Facility: CLINIC | Age: 45
End: 2020-02-10

## 2020-02-11 ENCOUNTER — TELEPHONE (OUTPATIENT)
Dept: WOUND CARE | Facility: CLINIC | Age: 45
End: 2020-02-11

## 2020-02-11 NOTE — TELEPHONE ENCOUNTER
PRIOR AUTHORIZATION DENIED    Medication: Morphine 0.1 % in solosite gel (compound)    Denial Date: 2/10/2020    Denial Rational: The morphine sulfate 100gm powder being used is not covered by [place] Medicaid        Appeal Information:

## 2020-02-11 NOTE — TELEPHONE ENCOUNTER
Contacted by [facility] pharmacy that morphine gel was denied by insurance. If she desires this medication she will need to pay OOP - typically costs around $35 for a month's supply. Her next step would be to contact the compounding pharmacy for payment.

## 2020-02-12 ENCOUNTER — TELEPHONE (OUTPATIENT)
Dept: WOUND CARE | Facility: CLINIC | Age: 45
End: 2020-02-12

## 2020-02-12 NOTE — TELEPHONE ENCOUNTER
Jeannette the nurse for [facility] needs a prescription for morphine gel.   Call her at  extension [phone].

## 2020-02-12 NOTE — TELEPHONE ENCOUNTER
Spoke Jeannette the RN and told her that she is not due to get a refill until 3/6/2020. She will have to call back. Her next appointment is 3/12/2020. They will have to call and ask for it again.

## 2020-02-13 ENCOUNTER — TELEPHONE (OUTPATIENT)
Dept: WOUND CARE | Facility: CLINIC | Age: 45
End: 2020-02-13

## 2020-02-13 DIAGNOSIS — L89.154 PRESSURE INJURY OF SACRAL REGION, STAGE 4 (H): ICD-10-CM

## 2020-02-13 DIAGNOSIS — G35 MS (MULTIPLE SCLEROSIS) (H): Primary | ICD-10-CM

## 2020-02-13 RX ORDER — LIDOCAINE HYDROCHLORIDE 20 MG/ML
JELLY TOPICAL DAILY
Qty: 30 ML | Refills: 11 | Status: SHIPPED | OUTPATIENT
Start: 2020-02-13 | End: 2020-02-13

## 2020-02-13 RX ORDER — LIDOCAINE HYDROCHLORIDE 20 MG/ML
JELLY TOPICAL DAILY
Qty: 30 ML | Refills: 11 | Status: SHIPPED | OUTPATIENT
Start: 2020-02-13 | End: 2020-05-21

## 2020-02-13 NOTE — ADDENDUM NOTE
Encounter addended by: Amy Carlos RN on: 2/13/2020 8:59 AM   Actions taken: Edited Discharge Instructions

## 2020-02-13 NOTE — DISCHARGE INSTRUCTIONS
[facility]  [address] [address]    Call us at [phone] if you have any questions about your wounds, have redness or swelling around your wound, have a fever of 101 or greater or if you have any other problems or concerns. We answer the phone Monday through Friday 8 am to 4 pm, please leave a message as we check the voicemail frequently throughout the day.     Marsha Mcneill      1975    [facility] fax [phone]    Medications/supplements to aid in healin. 1 packet of Amadeo Supplement into your favorite beverage TWICE a day  2. Please start Vitamin D3 5,000 iu per day.    Wound care recommendations to left ischial tuberosity  Clean wound with wound cleanser then apply 2% Lidocaine gel to wound bed followed by Aquacel AG and 4x4 Mepilex border. Change daily       Bethany Moya PA-C. 2020    Pelvic MRI at [facility]  at 10:00  [facility] follow up with Dr. Cedeno on  at 2:10

## 2020-02-13 NOTE — TELEPHONE ENCOUNTER
Nona needs to know how often for dressing changes.   She called yesterday and did not receive a call back regarding this.

## 2020-02-13 NOTE — TELEPHONE ENCOUNTER
Attempted to call Nnoa back twice this morning and phone range continuously then disconnected. On third attempt Nona answered. She reports that they only received 7gms of Morphine gel and it will not last 30 days with daily wound dressings. She also doesn't feel that it is helping with wound pain. New rx for Lidocaine gel sent to [facility] as an alternative and new orders faxed to Júnior Quintero at [phone]

## 2020-02-17 ENCOUNTER — TELEPHONE (OUTPATIENT)
Dept: ONCOLOGY | Facility: CLINIC | Age: 45
End: 2020-02-17

## 2020-02-25 ENCOUNTER — ANESTHESIA EVENT (OUTPATIENT)
Dept: SURGERY | Facility: CLINIC | Age: 45
End: 2020-02-25
Payer: MEDICAID

## 2020-02-26 ENCOUNTER — ANCILLARY PROCEDURE (OUTPATIENT)
Dept: ULTRASOUND IMAGING | Facility: CLINIC | Age: 45
End: 2020-02-26
Payer: MEDICAID

## 2020-02-26 ENCOUNTER — ANESTHESIA (OUTPATIENT)
Dept: SURGERY | Facility: CLINIC | Age: 45
End: 2020-02-26
Payer: MEDICAID

## 2020-02-26 ENCOUNTER — HOSPITAL ENCOUNTER (INPATIENT)
Facility: CLINIC | Age: 45
LOS: 3 days | Discharge: NURSING, REHAB OR LONG-TERM CARE | End: 2020-02-29
Attending: OBSTETRICS & GYNECOLOGY | Admitting: OBSTETRICS & GYNECOLOGY
Payer: MEDICAID

## 2020-02-26 DIAGNOSIS — N83.8 OVARIAN MASS: ICD-10-CM

## 2020-02-26 DIAGNOSIS — Z90.721 S/P RIGHT OOPHORECTOMY: Primary | ICD-10-CM

## 2020-02-26 LAB
ABO + RH BLD: NORMAL
ABO + RH BLD: NORMAL
B-HCG SERPL-ACNC: <1 IU/L (ref 0–5)
BLD GP AB SCN SERPL QL: NORMAL
BLOOD BANK CMNT PATIENT-IMP: NORMAL
BLOOD BANK CMNT PATIENT-IMP: NORMAL
GLUCOSE BLDC GLUCOMTR-MCNC: 86 MG/DL (ref 70–99)
HGB BLD-MCNC: 15.2 G/DL (ref 11.7–15.7)
POTASSIUM SERPL-SCNC: 3.9 MMOL/L (ref 3.4–5.3)
SPECIMEN EXP DATE BLD: NORMAL

## 2020-02-26 PROCEDURE — 25000125 ZZHC RX 250: Performed by: STUDENT IN AN ORGANIZED HEALTH CARE EDUCATION/TRAINING PROGRAM

## 2020-02-26 PROCEDURE — 71000017 ZZH RECOVERY PHASE 1 LEVEL 3 EA ADDTL HR: Performed by: OBSTETRICS & GYNECOLOGY

## 2020-02-26 PROCEDURE — 25800030 ZZH RX IP 258 OP 636: Performed by: ANESTHESIOLOGY

## 2020-02-26 PROCEDURE — 84132 ASSAY OF SERUM POTASSIUM: CPT | Performed by: OBSTETRICS & GYNECOLOGY

## 2020-02-26 PROCEDURE — 88305 TISSUE EXAM BY PATHOLOGIST: CPT | Performed by: OBSTETRICS & GYNECOLOGY

## 2020-02-26 PROCEDURE — 37000008 ZZH ANESTHESIA TECHNICAL FEE, 1ST 30 MIN: Performed by: OBSTETRICS & GYNECOLOGY

## 2020-02-26 PROCEDURE — 25000128 H RX IP 250 OP 636: Performed by: NURSE ANESTHETIST, CERTIFIED REGISTERED

## 2020-02-26 PROCEDURE — 25000128 H RX IP 250 OP 636: Performed by: OBSTETRICS & GYNECOLOGY

## 2020-02-26 PROCEDURE — 25000128 H RX IP 250 OP 636: Performed by: STUDENT IN AN ORGANIZED HEALTH CARE EDUCATION/TRAINING PROGRAM

## 2020-02-26 PROCEDURE — 71000016 ZZH RECOVERY PHASE 1 LEVEL 3 FIRST HR: Performed by: OBSTETRICS & GYNECOLOGY

## 2020-02-26 PROCEDURE — C1765 ADHESION BARRIER: HCPCS | Performed by: OBSTETRICS & GYNECOLOGY

## 2020-02-26 PROCEDURE — 0UT70ZZ RESECTION OF BILATERAL FALLOPIAN TUBES, OPEN APPROACH: ICD-10-PCS | Performed by: OBSTETRICS & GYNECOLOGY

## 2020-02-26 PROCEDURE — 36000053 ZZH SURGERY LEVEL 2 EA 15 ADDTL MIN - UMMC: Performed by: OBSTETRICS & GYNECOLOGY

## 2020-02-26 PROCEDURE — C9290 INJ, BUPIVACAINE LIPOSOME: HCPCS | Performed by: STUDENT IN AN ORGANIZED HEALTH CARE EDUCATION/TRAINING PROGRAM

## 2020-02-26 PROCEDURE — 25000132 ZZH RX MED GY IP 250 OP 250 PS 637: Performed by: OBSTETRICS & GYNECOLOGY

## 2020-02-26 PROCEDURE — 25000128 H RX IP 250 OP 636: Performed by: ANESTHESIOLOGY

## 2020-02-26 PROCEDURE — 40000556 ZZH STATISTIC PERIPHERAL IV START W US GUIDANCE

## 2020-02-26 PROCEDURE — 37000009 ZZH ANESTHESIA TECHNICAL FEE, EACH ADDTL 15 MIN: Performed by: OBSTETRICS & GYNECOLOGY

## 2020-02-26 PROCEDURE — 27210794 ZZH OR GENERAL SUPPLY STERILE: Performed by: OBSTETRICS & GYNECOLOGY

## 2020-02-26 PROCEDURE — 00000146 ZZHCL STATISTIC GLUCOSE BY METER IP

## 2020-02-26 PROCEDURE — 40000170 ZZH STATISTIC PRE-PROCEDURE ASSESSMENT II: Performed by: OBSTETRICS & GYNECOLOGY

## 2020-02-26 PROCEDURE — 88112 CYTOPATH CELL ENHANCE TECH: CPT | Performed by: OBSTETRICS & GYNECOLOGY

## 2020-02-26 PROCEDURE — 85018 HEMOGLOBIN: CPT | Performed by: OBSTETRICS & GYNECOLOGY

## 2020-02-26 PROCEDURE — 0UT00ZZ RESECTION OF RIGHT OVARY, OPEN APPROACH: ICD-10-PCS | Performed by: OBSTETRICS & GYNECOLOGY

## 2020-02-26 PROCEDURE — 36000051 ZZH SURGERY LEVEL 2 1ST 30 MIN - UMMC: Performed by: OBSTETRICS & GYNECOLOGY

## 2020-02-26 PROCEDURE — 00000155 ZZHCL STATISTIC H-CELL BLOCK W/STAIN: Performed by: OBSTETRICS & GYNECOLOGY

## 2020-02-26 PROCEDURE — 25800030 ZZH RX IP 258 OP 636: Performed by: STUDENT IN AN ORGANIZED HEALTH CARE EDUCATION/TRAINING PROGRAM

## 2020-02-26 PROCEDURE — 25000566 ZZH SEVOFLURANE, EA 15 MIN: Performed by: OBSTETRICS & GYNECOLOGY

## 2020-02-26 PROCEDURE — 88331 PATH CONSLTJ SURG 1 BLK 1SPC: CPT | Performed by: OBSTETRICS & GYNECOLOGY

## 2020-02-26 PROCEDURE — 25800030 ZZH RX IP 258 OP 636: Performed by: NURSE ANESTHETIST, CERTIFIED REGISTERED

## 2020-02-26 PROCEDURE — 25000132 ZZH RX MED GY IP 250 OP 250 PS 637: Performed by: STUDENT IN AN ORGANIZED HEALTH CARE EDUCATION/TRAINING PROGRAM

## 2020-02-26 PROCEDURE — 25000125 ZZHC RX 250: Performed by: NURSE ANESTHETIST, CERTIFIED REGISTERED

## 2020-02-26 PROCEDURE — 36415 COLL VENOUS BLD VENIPUNCTURE: CPT | Performed by: OBSTETRICS & GYNECOLOGY

## 2020-02-26 PROCEDURE — 88307 TISSUE EXAM BY PATHOLOGIST: CPT | Performed by: OBSTETRICS & GYNECOLOGY

## 2020-02-26 PROCEDURE — 25000125 ZZHC RX 250: Performed by: OBSTETRICS & GYNECOLOGY

## 2020-02-26 PROCEDURE — 12000001 ZZH R&B MED SURG/OB UMMC

## 2020-02-26 PROCEDURE — 84702 CHORIONIC GONADOTROPIN TEST: CPT | Performed by: OBSTETRICS & GYNECOLOGY

## 2020-02-26 PROCEDURE — 25800030 ZZH RX IP 258 OP 636: Performed by: OBSTETRICS & GYNECOLOGY

## 2020-02-26 RX ORDER — LIDOCAINE 40 MG/G
CREAM TOPICAL
Status: DISCONTINUED | OUTPATIENT
Start: 2020-02-26 | End: 2020-02-29 | Stop reason: HOSPADM

## 2020-02-26 RX ORDER — PSEUDOEPHEDRINE HCL 60 MG
60 TABLET ORAL EVERY 4 HOURS PRN
Status: DISCONTINUED | OUTPATIENT
Start: 2020-02-26 | End: 2020-02-29 | Stop reason: HOSPADM

## 2020-02-26 RX ORDER — SODIUM CHLORIDE, SODIUM LACTATE, POTASSIUM CHLORIDE, CALCIUM CHLORIDE 600; 310; 30; 20 MG/100ML; MG/100ML; MG/100ML; MG/100ML
INJECTION, SOLUTION INTRAVENOUS CONTINUOUS
Status: DISCONTINUED | OUTPATIENT
Start: 2020-02-26 | End: 2020-02-27

## 2020-02-26 RX ORDER — BACLOFEN 20 MG/1
20 TABLET ORAL 3 TIMES DAILY PRN
Status: DISCONTINUED | OUTPATIENT
Start: 2020-02-26 | End: 2020-02-29 | Stop reason: HOSPADM

## 2020-02-26 RX ORDER — TRAZODONE HYDROCHLORIDE 50 MG/1
100 TABLET, FILM COATED ORAL AT BEDTIME
Status: DISCONTINUED | OUTPATIENT
Start: 2020-02-26 | End: 2020-02-29 | Stop reason: HOSPADM

## 2020-02-26 RX ORDER — CALCIUM CARBONATE 500 MG/1
500-1000 TABLET, CHEWABLE ORAL DAILY PRN
Status: DISCONTINUED | OUTPATIENT
Start: 2020-02-26 | End: 2020-02-29 | Stop reason: HOSPADM

## 2020-02-26 RX ORDER — FENTANYL CITRATE 50 UG/ML
25-50 INJECTION, SOLUTION INTRAMUSCULAR; INTRAVENOUS
Status: DISCONTINUED | OUTPATIENT
Start: 2020-02-26 | End: 2020-02-26 | Stop reason: HOSPADM

## 2020-02-26 RX ORDER — SODIUM CHLORIDE, SODIUM LACTATE, POTASSIUM CHLORIDE, CALCIUM CHLORIDE 600; 310; 30; 20 MG/100ML; MG/100ML; MG/100ML; MG/100ML
INJECTION, SOLUTION INTRAVENOUS CONTINUOUS
Status: DISCONTINUED | OUTPATIENT
Start: 2020-02-26 | End: 2020-02-26 | Stop reason: HOSPADM

## 2020-02-26 RX ORDER — FLUMAZENIL 0.1 MG/ML
0.2 INJECTION, SOLUTION INTRAVENOUS
Status: DISCONTINUED | OUTPATIENT
Start: 2020-02-26 | End: 2020-02-26 | Stop reason: HOSPADM

## 2020-02-26 RX ORDER — FENTANYL CITRATE 50 UG/ML
INJECTION, SOLUTION INTRAMUSCULAR; INTRAVENOUS PRN
Status: DISCONTINUED | OUTPATIENT
Start: 2020-02-26 | End: 2020-02-26

## 2020-02-26 RX ORDER — ONDANSETRON 4 MG/1
4 TABLET, ORALLY DISINTEGRATING ORAL EVERY 8 HOURS PRN
Status: DISCONTINUED | OUTPATIENT
Start: 2020-02-27 | End: 2020-02-29 | Stop reason: HOSPADM

## 2020-02-26 RX ORDER — ONDANSETRON 2 MG/ML
INJECTION INTRAMUSCULAR; INTRAVENOUS PRN
Status: DISCONTINUED | OUTPATIENT
Start: 2020-02-26 | End: 2020-02-26

## 2020-02-26 RX ORDER — ONDANSETRON 2 MG/ML
4 INJECTION INTRAMUSCULAR; INTRAVENOUS EVERY 30 MIN PRN
Status: DISCONTINUED | OUTPATIENT
Start: 2020-02-26 | End: 2020-02-26 | Stop reason: HOSPADM

## 2020-02-26 RX ORDER — ONDANSETRON 4 MG/1
4 TABLET, ORALLY DISINTEGRATING ORAL EVERY 8 HOURS
Status: COMPLETED | OUTPATIENT
Start: 2020-02-26 | End: 2020-02-27

## 2020-02-26 RX ORDER — BUPIVACAINE HYDROCHLORIDE 2.5 MG/ML
INJECTION, SOLUTION EPIDURAL; INFILTRATION; INTRACAUDAL PRN
Status: DISCONTINUED | OUTPATIENT
Start: 2020-02-26 | End: 2020-02-26

## 2020-02-26 RX ORDER — OXYCODONE HYDROCHLORIDE 5 MG/1
5-10 TABLET ORAL EVERY 4 HOURS PRN
Status: DISCONTINUED | OUTPATIENT
Start: 2020-02-26 | End: 2020-02-28

## 2020-02-26 RX ORDER — CIPROFLOXACIN 2 MG/ML
400 INJECTION, SOLUTION INTRAVENOUS
Status: DISCONTINUED | OUTPATIENT
Start: 2020-02-26 | End: 2020-02-26 | Stop reason: HOSPADM

## 2020-02-26 RX ORDER — OXYBUTYNIN CHLORIDE 5 MG/1
15 TABLET, EXTENDED RELEASE ORAL AT BEDTIME
Status: DISCONTINUED | OUTPATIENT
Start: 2020-02-26 | End: 2020-02-29 | Stop reason: HOSPADM

## 2020-02-26 RX ORDER — DEXAMETHASONE SODIUM PHOSPHATE 4 MG/ML
INJECTION, SOLUTION INTRA-ARTICULAR; INTRALESIONAL; INTRAMUSCULAR; INTRAVENOUS; SOFT TISSUE PRN
Status: DISCONTINUED | OUTPATIENT
Start: 2020-02-26 | End: 2020-02-26

## 2020-02-26 RX ORDER — LIDOCAINE HYDROCHLORIDE 20 MG/ML
JELLY TOPICAL DAILY PRN
Status: DISCONTINUED | OUTPATIENT
Start: 2020-02-26 | End: 2020-02-29 | Stop reason: HOSPADM

## 2020-02-26 RX ORDER — HYDROMORPHONE HYDROCHLORIDE 1 MG/ML
.3-.5 INJECTION, SOLUTION INTRAMUSCULAR; INTRAVENOUS; SUBCUTANEOUS
Status: DISCONTINUED | OUTPATIENT
Start: 2020-02-26 | End: 2020-02-27

## 2020-02-26 RX ORDER — IBUPROFEN 600 MG/1
600 TABLET, FILM COATED ORAL EVERY 6 HOURS
Status: DISCONTINUED | OUTPATIENT
Start: 2020-02-26 | End: 2020-02-29 | Stop reason: HOSPADM

## 2020-02-26 RX ORDER — SIMETHICONE 80 MG
80 TABLET,CHEWABLE ORAL 4 TIMES DAILY PRN
Status: DISCONTINUED | OUTPATIENT
Start: 2020-02-26 | End: 2020-02-29 | Stop reason: HOSPADM

## 2020-02-26 RX ORDER — DIAZEPAM 2 MG
2 TABLET ORAL 2 TIMES DAILY PRN
Status: DISCONTINUED | OUTPATIENT
Start: 2020-02-26 | End: 2020-02-29 | Stop reason: HOSPADM

## 2020-02-26 RX ORDER — IBUPROFEN 200 MG
600 TABLET ORAL EVERY 6 HOURS PRN
Status: DISCONTINUED | OUTPATIENT
Start: 2020-02-26 | End: 2020-02-26

## 2020-02-26 RX ORDER — NALOXONE HYDROCHLORIDE 0.4 MG/ML
.1-.4 INJECTION, SOLUTION INTRAMUSCULAR; INTRAVENOUS; SUBCUTANEOUS
Status: DISCONTINUED | OUTPATIENT
Start: 2020-02-26 | End: 2020-02-29 | Stop reason: HOSPADM

## 2020-02-26 RX ORDER — KETAMINE HYDROCHLORIDE 10 MG/ML
INJECTION, SOLUTION INTRAMUSCULAR; INTRAVENOUS PRN
Status: DISCONTINUED | OUTPATIENT
Start: 2020-02-26 | End: 2020-02-26

## 2020-02-26 RX ORDER — PHENAZOPYRIDINE HYDROCHLORIDE 200 MG/1
200 TABLET, FILM COATED ORAL ONCE
Status: COMPLETED | OUTPATIENT
Start: 2020-02-26 | End: 2020-02-26

## 2020-02-26 RX ORDER — NALOXONE HYDROCHLORIDE 0.4 MG/ML
.1-.4 INJECTION, SOLUTION INTRAMUSCULAR; INTRAVENOUS; SUBCUTANEOUS
Status: DISCONTINUED | OUTPATIENT
Start: 2020-02-26 | End: 2020-02-26 | Stop reason: HOSPADM

## 2020-02-26 RX ORDER — CIPROFLOXACIN 2 MG/ML
400 INJECTION, SOLUTION INTRAVENOUS SEE ADMIN INSTRUCTIONS
Status: DISCONTINUED | OUTPATIENT
Start: 2020-02-26 | End: 2020-02-26 | Stop reason: HOSPADM

## 2020-02-26 RX ORDER — LIDOCAINE 40 MG/G
CREAM TOPICAL
Status: DISCONTINUED | OUTPATIENT
Start: 2020-02-26 | End: 2020-02-26 | Stop reason: HOSPADM

## 2020-02-26 RX ORDER — ACETAMINOPHEN 325 MG/1
650 TABLET ORAL EVERY 6 HOURS
Status: DISCONTINUED | OUTPATIENT
Start: 2020-02-26 | End: 2020-02-29 | Stop reason: HOSPADM

## 2020-02-26 RX ORDER — NALOXONE HYDROCHLORIDE 0.4 MG/ML
.1-.4 INJECTION, SOLUTION INTRAMUSCULAR; INTRAVENOUS; SUBCUTANEOUS
Status: ACTIVE | OUTPATIENT
Start: 2020-02-26 | End: 2020-02-27

## 2020-02-26 RX ORDER — LIDOCAINE HYDROCHLORIDE 20 MG/ML
INJECTION, SOLUTION INFILTRATION; PERINEURAL PRN
Status: DISCONTINUED | OUTPATIENT
Start: 2020-02-26 | End: 2020-02-26

## 2020-02-26 RX ORDER — MAGNESIUM HYDROXIDE 1200 MG/15ML
LIQUID ORAL PRN
Status: DISCONTINUED | OUTPATIENT
Start: 2020-02-26 | End: 2020-02-26 | Stop reason: HOSPADM

## 2020-02-26 RX ORDER — HYDROMORPHONE HYDROCHLORIDE 1 MG/ML
0.5 INJECTION, SOLUTION INTRAMUSCULAR; INTRAVENOUS; SUBCUTANEOUS ONCE
Status: COMPLETED | OUTPATIENT
Start: 2020-02-26 | End: 2020-02-26

## 2020-02-26 RX ORDER — DOXEPIN HYDROCHLORIDE 25 MG/1
25 CAPSULE ORAL AT BEDTIME
Status: DISCONTINUED | OUTPATIENT
Start: 2020-02-26 | End: 2020-02-29 | Stop reason: HOSPADM

## 2020-02-26 RX ORDER — ONDANSETRON 4 MG/1
4 TABLET, ORALLY DISINTEGRATING ORAL EVERY 30 MIN PRN
Status: DISCONTINUED | OUTPATIENT
Start: 2020-02-26 | End: 2020-02-26 | Stop reason: HOSPADM

## 2020-02-26 RX ORDER — PROPOFOL 10 MG/ML
INJECTION, EMULSION INTRAVENOUS PRN
Status: DISCONTINUED | OUTPATIENT
Start: 2020-02-26 | End: 2020-02-26

## 2020-02-26 RX ADMIN — FLUOXETINE 60 MG: 20 CAPSULE ORAL at 21:57

## 2020-02-26 RX ADMIN — ONDANSETRON 4 MG: 2 INJECTION INTRAMUSCULAR; INTRAVENOUS at 09:58

## 2020-02-26 RX ADMIN — SODIUM CHLORIDE, POTASSIUM CHLORIDE, SODIUM LACTATE AND CALCIUM CHLORIDE: 600; 310; 30; 20 INJECTION, SOLUTION INTRAVENOUS at 12:12

## 2020-02-26 RX ADMIN — BUPIVACAINE HYDROCHLORIDE 20 ML: 2.5 INJECTION, SOLUTION EPIDURAL; INFILTRATION; INTRACAUDAL; PERINEURAL at 08:17

## 2020-02-26 RX ADMIN — PROPOFOL 130 MG: 10 INJECTION, EMULSION INTRAVENOUS at 08:07

## 2020-02-26 RX ADMIN — LIDOCAINE HYDROCHLORIDE 40 MG: 20 INJECTION, SOLUTION INFILTRATION; PERINEURAL at 08:03

## 2020-02-26 RX ADMIN — LIDOCAINE HYDROCHLORIDE: 20 JELLY TOPICAL at 21:57

## 2020-02-26 RX ADMIN — FENTANYL CITRATE 25 MCG: 50 INJECTION, SOLUTION INTRAMUSCULAR; INTRAVENOUS at 11:35

## 2020-02-26 RX ADMIN — TRAZODONE HYDROCHLORIDE 100 MG: 50 TABLET ORAL at 21:58

## 2020-02-26 RX ADMIN — IBUPROFEN 600 MG: 200 TABLET, FILM COATED ORAL at 12:12

## 2020-02-26 RX ADMIN — PROPOFOL 180 MG: 10 INJECTION, EMULSION INTRAVENOUS at 08:03

## 2020-02-26 RX ADMIN — SODIUM CHLORIDE, POTASSIUM CHLORIDE, SODIUM LACTATE AND CALCIUM CHLORIDE: 600; 310; 30; 20 INJECTION, SOLUTION INTRAVENOUS at 20:58

## 2020-02-26 RX ADMIN — OXYCODONE HYDROCHLORIDE 5 MG: 5 TABLET ORAL at 21:57

## 2020-02-26 RX ADMIN — METRONIDAZOLE 500 MG: 500 INJECTION, SOLUTION INTRAVENOUS at 08:50

## 2020-02-26 RX ADMIN — DEXAMETHASONE SODIUM PHOSPHATE 6 MG: 4 INJECTION, SOLUTION INTRA-ARTICULAR; INTRALESIONAL; INTRAMUSCULAR; INTRAVENOUS; SOFT TISSUE at 08:54

## 2020-02-26 RX ADMIN — PHENAZOPYRIDINE HYDROCHLORIDE 200 MG: 200 TABLET ORAL at 07:28

## 2020-02-26 RX ADMIN — PHENYLEPHRINE HYDROCHLORIDE 100 MCG: 10 INJECTION INTRAVENOUS at 08:17

## 2020-02-26 RX ADMIN — GENTAMICIN SULFATE: 40 INJECTION, SOLUTION INTRAMUSCULAR; INTRAVENOUS at 22:05

## 2020-02-26 RX ADMIN — HYDROMORPHONE HYDROCHLORIDE 0.5 MG: 1 INJECTION, SOLUTION INTRAMUSCULAR; INTRAVENOUS; SUBCUTANEOUS at 12:00

## 2020-02-26 RX ADMIN — FENTANYL CITRATE 50 MCG: 50 INJECTION, SOLUTION INTRAMUSCULAR; INTRAVENOUS at 09:54

## 2020-02-26 RX ADMIN — IBUPROFEN 600 MG: 600 TABLET ORAL at 19:20

## 2020-02-26 RX ADMIN — OXYBUTYNIN CHLORIDE 15 MG: 5 TABLET, EXTENDED RELEASE ORAL at 21:57

## 2020-02-26 RX ADMIN — ONDANSETRON 4 MG: 4 TABLET, ORALLY DISINTEGRATING ORAL at 14:20

## 2020-02-26 RX ADMIN — ONDANSETRON 4 MG: 4 TABLET, ORALLY DISINTEGRATING ORAL at 21:56

## 2020-02-26 RX ADMIN — BUPIVACAINE 20 ML: 13.3 INJECTION, SUSPENSION, LIPOSOMAL INFILTRATION at 08:17

## 2020-02-26 RX ADMIN — Medication 20 MG: at 08:05

## 2020-02-26 RX ADMIN — FENTANYL CITRATE 50 MCG: 50 INJECTION, SOLUTION INTRAMUSCULAR; INTRAVENOUS at 09:23

## 2020-02-26 RX ADMIN — CIPROFLOXACIN 400 MG: 2 INJECTION INTRAVENOUS at 08:20

## 2020-02-26 RX ADMIN — DOXEPIN HYDROCHLORIDE 25 MG: 25 CAPSULE ORAL at 21:56

## 2020-02-26 RX ADMIN — ACETAMINOPHEN 650 MG: 325 TABLET, FILM COATED ORAL at 12:12

## 2020-02-26 RX ADMIN — PREGABALIN 100 MG: 75 CAPSULE ORAL at 14:19

## 2020-02-26 RX ADMIN — ENOXAPARIN SODIUM 40 MG: 40 INJECTION SUBCUTANEOUS at 19:20

## 2020-02-26 RX ADMIN — ACETAMINOPHEN 650 MG: 325 TABLET, FILM COATED ORAL at 19:20

## 2020-02-26 RX ADMIN — SODIUM CHLORIDE, POTASSIUM CHLORIDE, SODIUM LACTATE AND CALCIUM CHLORIDE: 600; 310; 30; 20 INJECTION, SOLUTION INTRAVENOUS at 07:41

## 2020-02-26 RX ADMIN — PREGABALIN 100 MG: 75 CAPSULE ORAL at 19:20

## 2020-02-26 RX ADMIN — FENTANYL CITRATE 25 MCG: 50 INJECTION, SOLUTION INTRAMUSCULAR; INTRAVENOUS at 11:24

## 2020-02-26 ASSESSMENT — ENCOUNTER SYMPTOMS: SEIZURES: 0

## 2020-02-26 ASSESSMENT — PAIN DESCRIPTION - DESCRIPTORS
DESCRIPTORS: SORE

## 2020-02-26 ASSESSMENT — ACTIVITIES OF DAILY LIVING (ADL)
SWALLOWING: 0-->SWALLOWS FOODS/LIQUIDS WITHOUT DIFFICULTY
AMBULATION: 4-->COMPLETELY DEPENDENT
RETIRED_COMMUNICATION: 0-->UNDERSTANDS/COMMUNICATES WITHOUT DIFFICULTY
ADLS_ACUITY_SCORE: 39
WHICH_OF_THE_ABOVE_FUNCTIONAL_RISKS_HAD_A_RECENT_ONSET_OR_CHANGE?: AMBULATION;TRANSFERRING
FALL_HISTORY_WITHIN_LAST_SIX_MONTHS: NO
RETIRED_EATING: 4-->COMPLETELY DEPENDENT
ADLS_ACUITY_SCORE: 39
TRANSFERRING: 4-->COMPLETELY DEPENDENT
BATHING: 4-->COMPLETELY DEPENDENT
TOILETING: 4-->COMPLETELY DEPENDENT
DRESS: 4-->COMPLETELY DEPENDENT
COGNITION: 0 - NO COGNITION ISSUES REPORTED

## 2020-02-26 ASSESSMENT — LIFESTYLE VARIABLES: TOBACCO_USE: 0

## 2020-02-26 ASSESSMENT — MIFFLIN-ST. JEOR: SCORE: 1404.31

## 2020-02-26 NOTE — ANESTHESIA POSTPROCEDURE EVALUATION
Anesthesia POST Procedure Evaluation    Patient: Marsha Mcneill   MRN:     [id number] Gender:   female   Age:    44 year old :      1975        Preoperative Diagnosis: Ovarian mass [N83.8]   Procedure(s):  Laparotomy, removal of pelvic mass, both fallopian tubes,   Postop Comments: No value filed.     Anesthesia Type: No value filed.       Disposition: Admission   Postop Pain Control: Uneventful            Sign Out: Well controlled pain   PONV: No   Neuro/Psych: Uneventful            Sign Out: Acceptable/Baseline neuro status   Airway/Respiratory: Uneventful            Sign Out: Acceptable/Baseline resp. status   CV/Hemodynamics: Uneventful            Sign Out: Acceptable CV status   Other NRE: NONE   DID A NON-ROUTINE EVENT OCCUR? No         Last Anesthesia Record Vitals:  CRNA VITALS  2020 1011 - 2020 1111      2020             Resp Rate (observed):  14    EKG:  Sinus rhythm          Last PACU Vitals:  Vitals Value Taken Time   /76 2020 12:40 PM   Temp 36.3  C (97.4  F) 2020 12:30 PM   Pulse 90 2020 12:40 PM   Resp 13 2020 12:30 PM   SpO2 97 % 2020 12:45 PM   Temp src     NIBP     Pulse     SpO2     Resp     Temp     Ht Rate     Temp 2     Vitals shown include unvalidated device data.      Electronically Signed By: Saleem Carroll MD, 2020, 1:13 PM

## 2020-02-26 NOTE — ANESTHESIA PREPROCEDURE EVALUATION
Anesthesia Pre-Procedure Evaluation    Patient: Marsha Mcneill   MRN:     [id number] Gender:   female   Age:    44 year old :      1975        Preoperative Diagnosis: * No surgery found *        Past Medical History:   Diagnosis Date     Anxiety      Chronic pain      Depression      History of DVT (deep vein thrombosis)      Ileostomy status (H)      Insomnia      MRSA (methicillin resistant staph aureus) culture positive      Multiple scleroses      Neurological disorders      Pressure sore      Urinary tract infection       Past Surgical History:   Procedure Laterality Date     BLADDER AUGMENTATION  2012    Procedure: BLADDER AUGMENTATION;  APPENDECTOMY, BLADDER AUGMENTATION, HUMBERTO BLADDER STOMA;  Surgeon: Errol Westbrook MD;  Location: [facility] OR     ILEOSTOMY  2018     INSERT PUMP BACLOFEN      2 revisions     IRRIGATION AND DEBRIDEMENT DECUBITUS WOUND, COMBINED  2019     LAPAROTOMY EXPLORATORY  2012    Procedure: LAPAROTOMY EXPLORATORY;  Removal of Inter Abdominal Drain;  Surgeon: Errol Westbrook MD;  Location: [facility] OR     LAPAROTOMY EXPLORATORY      ileostomy fro diversion of obstipation     MITROFANOFF PROCEDURE (APPENDIX CONDUIT)  2012    Procedure: MITROFANOFF PROCEDURE (APPENDIX CONDUIT);;  Surgeon: Errol Westbrook MD;  Location: [facility] OR          Anesthesia Evaluation     . Pt has had prior anesthetic. Type: General    No history of anesthetic complications    slow to wake       ROS/MED HX    ENT/Pulmonary:  - neg pulmonary ROS    (-) tobacco use   Neurologic:     (+)Multiple Sclerosis (patient has baclofen pump) limitations: wheelchair bound, has slight movement in left hand, other neuro (multiple sclerosis)    (-) seizures, CVA and TIA   Cardiovascular: Comment: Patient has lower extremity edema.     (+) ----. : . . . :. . Previous cardiac testing date:results:date: results:ECG reviewed date:19 results:NSR date: results:          METS/Exercise Tolerance:  1 -  Eating, dressing   Hematologic:     (+) History of blood clots (patient had subclavian thrombosis in 2012) pt is not anticoagulated, -     (-) anemia and History of Transfusion   Musculoskeletal:   (+)  other musculoskeletal- pressure sore/osteomyelitis      GI/Hepatic: Comment: S/p ileostomy 2018 for neurogenic bowel       (-) GERD   Renal/Genitourinary: Comment: S/p ileocystoplasty with Erik 2012    Neurogenic bladder    Ovarian mass    (+) Other Renal/ Genitourinary, history of recurrent UTIs      Endo:  - neg endo ROS       Psychiatric:     (+) psychiatric history anxiety, other (comment) and depression (insomnia)      Infectious Disease:   (+) MRSA,       Malignancy:         Other: Comment: Rheumatoid arthritis    (+) H/O Chronic Pain,H/O chronic opiod use , other significant disability Wheelchair bound                       PHYSICAL EXAM:   Mental Status/Neuro: A/A/O; Age Appropriate   Airway: Facies: Feasible  Mallampati: II  Mouth/Opening: Full  TM distance: > 6 cm  Neck ROM: Not Assessed   Respiratory: Auscultation: CTAB     Resp. Rate: Normal     Resp. Effort: Normal      CV: Rhythm: Regular  Edema: LLE; RLE  Pulses: Normal   Comments: Unable to assess neck ROM as patient has no muscle control secondary to MS.                      Results for KIMBERLEY BARCLAY (MRN [id number]) as of 2/3/2020 12:40   Ref. Range 2/3/2020 12:04   Sodium Latest Ref Range: 133 - 144 mmol/L 138   Potassium Latest Ref Range: 3.4 - 5.3 mmol/L 4.3   Chloride Latest Ref Range: 94 - 109 mmol/L 109   Carbon Dioxide Latest Ref Range: 20 - 32 mmol/L 22   Urea Nitrogen Latest Ref Range: 7 - 30 mg/dL 14   Creatinine Latest Ref Range: 0.52 - 1.04 mg/dL 0.46 (L)   GFR Estimate Latest Ref Range: >60 mL/min/1.73_m2 >90   GFR Estimate If Black Latest Ref Range: >60 mL/min/1.73_m2 >90   Calcium Latest Ref Range: 8.5 - 10.1 mg/dL 9.5   Anion Gap Latest Ref Range: 3 - 14 mmol/L 7   N-Terminal Pro Bnp Latest Ref Range: 0 - 125 pg/mL 67   Glucose  "Latest Ref Range: 70 - 99 mg/dL 74   WBC Latest Ref Range: 4.0 - 11.0 10e9/L 4.7   Hemoglobin Latest Ref Range: 11.7 - 15.7 g/dL 13.9   Hematocrit Latest Ref Range: 35.0 - 47.0 % 43.2   Platelet Count Latest Ref Range: 150 - 450 10e9/L 217   RBC Count Latest Ref Range: 3.8 - 5.2 10e12/L 4.64   MCV Latest Ref Range: 78 - 100 fl 93   MCH Latest Ref Range: 26.5 - 33.0 pg 30.0   MCHC Latest Ref Range: 31.5 - 36.5 g/dL 32.2   RDW Latest Ref Range: 10.0 - 15.0 % 12.5     Preop Vitals    BP Readings from Last 3 Encounters:   02/26/20 108/74   02/03/20 106/72   01/30/20 115/77    Pulse Readings from Last 3 Encounters:   02/26/20 87   02/03/20 68   01/30/20 66      Resp Readings from Last 3 Encounters:   02/26/20 13   02/03/20 19   01/30/20 19    SpO2 Readings from Last 3 Encounters:   02/26/20 97%   02/03/20 97%   01/13/20 98%      Temp Readings from Last 1 Encounters:   02/26/20 36.3  C (97.4  F) (Oral)    Ht Readings from Last 1 Encounters:   02/26/20 1.702 m (5' 7\")      Wt Readings from Last 1 Encounters:   02/26/20 71.7 kg (158 lb)    Estimated body mass index is 24.75 kg/m  as calculated from the following:    Height as of an earlier encounter on 2/26/20: 1.702 m (5' 7\").    Weight as of an earlier encounter on 2/26/20: 71.7 kg (158 lb).     LDA:  Peripheral IV 02/26/20 Left;Anterior Upper forearm (Active)   Site Assessment WDL 2/26/2020 12:30 PM   Line Status Infusing 2/26/2020 12:30 PM   Phlebitis Scale 0-->no symptoms 2/26/2020 12:30 PM   Infiltration Scale 0 2/26/2020 12:30 PM   Extravasation? No 2/26/2020  7:25 AM   Dressing Intervention New dressing  2/26/2020  7:25 AM   Number of days: 0       Urethral Catheter 14 fr (Active)   Tube Description UTV 2/26/2020 12:30 PM   Collection Container Standard 2/26/2020 12:30 PM   Securement Method Leg strap 2/26/2020 12:30 PM   Rationale for Continued Use Neurogenic Bladder 2/26/2020 12:30 PM   Number of days: 0       Suprapubic Catheter Non-latex 20 fr (Active)   Number " of days: 2637       Suprapubic Catheter Non-latex 14 fr (Active)   Number of days: 2637        Assessment:   ASA SCORE: 3    H&P: History and physical reviewed and following examination; no interval change.   Smoking Status:  Non-Smoker/Unknown   NPO Status: NPO Appropriate     Plan:   Anes. Type:  General   Pre-Medication: None   Induction:  IV (Standard)   Airway: ETT; Oral   Access/Monitoring: PIV; 2nd PIV   Maintenance: Balanced     Postop Plan:   Postop Pain: Opioids  Postop Sedation/Airway: Not planned  Disposition: Inpatient/Admit     PONV Management:   Adult Risk Factors: Female, Non-Smoker, Postop Opioids   Prevention: Ondansetron, Dexamethasone     CONSENT: Direct conversation   Plan and risks discussed with: Patient   Blood Products: Consented (ALL Blood Products)       Comments for Plan/Consent:  Anesthesia plan was discussed in detail with patient. She voiced understanding and agreed to proceed as planned. Questions were sought and answered                PAC Discussion and Assessment    ASA Classification: 3  Case is suitable for: [place]  Anesthetic techniques and relevant risks discussed: PAC Recommendations anesthetic techniques: choice.  Invasive monitoring and risk discussed:   Types:   Possibility and Risk of blood transfusion discussed:   NPO instructions given:   Additional anesthetic preparation and risks discussed:   Needs early admission to pre-op area:   Other:     PAC Resident/NP Anesthesia Assessment:  Marsha is a 44 year old woman who is scheduled for Laparotomy, removal of pelvic mass, both fallopian tubes, possible cancer operation. on 2/26/20 by Dr. Barron and Dr. Hernandez in treatment of ovarian mass.  PAC referral for risk assessment and optimization for anesthesia with comorbid conditions of lower extremity edema, history of DVT, multiple sclerosis, s/p ileostomy, neurogenic bladder s/p ileocystoplasty with Erik, history of recurrent UTIs, MRSA, anxiety, depression, insomnia,  chronic pain, history of pressure sore/osteomyelitis:    Pre-operative considerations:  1.  Cardiac:  Functional status- METS 1, patient secondary to MS.  High risk surgery with 0.9% (RCRI #) risk of major adverse cardiac event. The patient had EKG on 5/6/19 showing normal sinus rhythm. Will get baseline BNP given the patient's limited METS secondary to MS.   ~ Lower extremity edema - the patient has cotton wraps placed as needed.     2.  Pulm:  Airway feasible.  LUCY risk: Low  ~ Allergies - the patient uses sudafed as needed.     3. Heme:  History of subclavian thrombosis in 2012. She is not on any anticoagulation.   ~ The patient has history of difficult IV access. She has a PORT in place but this is clotting and unable to be used.     4. Neuro: Multiple Sclerosis - the patient is transported in [facility] today. She will continue baclofen pump, valium as needed.      5. GI:  Risk of PONV score =3.  If > 2, anti-emetic intervention recommended.  ~ neurogenic bowel s/p ileostomy. The patient's bag is emptied every 2 hours. She has no concerns with high output.     6. : neurogenic bladder - the patient is s/ ileocystoplasty with zahra - she has issues with recurrent UTIs - She completed a course of bactrim for UTI/osteomyelitis today. Continue ditropan.     7. ID: History of MRSA in pressure sore - contact precautions as needed.     8. Psych: Anxiety, depression, insomnia - the patient follows with Lionel LINARESGARY and last seen on 1/22/20. She will continue prozac, lyrica, trazodone, doxepin.     9. Musculoskeletal: History of pressure sore/osteomyelitis - the patient is s/p irrigation and debridement of the wound. She completed her vancomyocin course. She continues to have pain in the area. Recommend careful positioning and padding.   ~ Chronic pain/Rheumatoid arthritis - continue PRN oxycodone, tylenol, ibuprofen. The patient was seen by PharmD as a part of today's visit. Please see their note for post op pain  plan.     VTE risk: 0.26%-1.8%    Patient is optimized and is acceptable candidate for the proposed procedure.  No further diagnostic evaluation is needed.     Patient also evaluated by Dr Lee. See recommendations below.     For further details of assessment, testing, and physical exam please see H and P completed on same date.    Daxa Mc PA-C          Mid-Level Provider/Resident: Daxa Mc PA-C  Date: 2/3/20  Time:     Attending Anesthesiologist Anesthesia Assessment:  I have examined the patient and reviewed the medical record.  I have discussed the patient with the LULA and concur with her assessment  The patient is scheduled for laparotomy for ovarian mass removal  The mass was discovered incidentally on MRI to evaluate osteomyelitis of sacrum secondary to decubitus ulcer  The patient has progressive MS and is WC bound with limited ability to even sit in an upright postiion.  Her current therapy includes rituximab and a baclofen pump  She has not been on steroids for many years per patient  She has neurogenic bladder and neurogenic bowel.  She had bladder augmentation 2012 and ileostomy 2019.  She reports difficulty awakening from anesthesia but has never had a problem with post operative respiratory dysfunction.  She reports no swallowing, dysphagia or respiratory complications with her MS.  She denies cardiac symptoms but has limited exercise potential    PE:  Very pleasant, obese female in NAD.  IN WC in semirecumbent position.  Able to extend head and neck well without difficulty.  MPC 3-4 with large tongue  Six cm TMD.  Lungs clear  CV  RRR without murmur.  RR 19  SpO2 97%  No apparent dyspnea    Patient will be at risk for post operative respiratory compromise.  Discussed possibly remaining intubated or transitioning to BiPAP in PACU.  Will check BNP but patient is low risk for cardiac disease  Final plan per attending anesthesiologist the day of surgery.      Reviewed and Signed by PAC  Anesthesiologist  Anesthesiologist: Glynn Lee MD  Date: 2/3/2020  Time:   Pass/Fail:   Disposition:     PAC Pharmacist Assessment:        Pharmacist:   Date:   Time:    Saleem Carroll MD

## 2020-02-26 NOTE — ANESTHESIA PROCEDURE NOTES
Peripheral Nerve Block Procedure Note    Location: OR AFTER induction  Procedure Start/Stop TImes:      2/26/2020 8:11 AM     2/26/2020 8:17 AM    patient identified, IV checked, site marked, risks and benefits discussed, informed consent, monitors and equipment checked, pre-op evaluation, at physician/surgeon's request and post-op pain management      Correct Patient: Yes      Correct Position: Yes      Correct Site: Yes      Correct Procedure: Yes      Correct Laterality:  Yes    Site Marked:  Yes  Procedure details:     Procedure:  TAP    ASA:  3    Diagnosis:  Post operative pain    Laterality:  Bilateral    Position:  Supine    Sterile Prep: chloraprep, mask and sterile gloves      Local skin infiltration:  None    Needle:  Short bevel    Needle gauge:  21    Needle length (mm):  110    Ultrasound: Yes      Ultrasound used to identify targeted nerve, plexus, or vascular structure and placed a needle adjacent to it      Permanent Image entered into patiient's record      Abnormal pain on injection: No      Blood Aspirated: No      Paresthesias:  No    Bleeding at site: No      Bolus via:  Needle    Infusion Method:  Single Shot    Complications:  None  Assessment/Narrative:     Injection made incrementally with aspirations every (mL):  5

## 2020-02-26 NOTE — DISCHARGE SUMMARY
[facility] Discharge Summary    Marsha Mcneill MRN# [id number]   Age: 44 year old YOB: 1975     Date of Admission:  2/26/2020  Date of Discharge::  2/29/2020  Admitting Physician:  Tommie Barron MD  Discharge Physician:  Maribel Toribio MD     Home clinic: [facility]          Admission Diagnoses:   - Pelvic mass  - Neurogenic bowel with ileostomy  - Neurogenic bladder with ileocystoplasty  - Pressure sore  - Chronic pain   - Multiple scleroses          Discharge Diagnosis:   - Dermoid   - Neurogenic bowel with ileostomy  - Neurogenic bladder with ileocystoplasty  - Pressure sore  - Chronic pain   - Multiple scleroses          Procedures:   - Exploratory laparotomy, right oophorectomy, bilateral salpingectomy  - Inspection of ileocystoplasty by Urology           Medications Prior to Admission:     Medications Prior to Admission   Medication Sig Dispense Refill Last Dose     acetaminophen (TYLENOL) 500 MG tablet Take 1,000 mg by mouth 3 times daily    2/26/2020 at Unknown time     baclofen (GABLOFEN) 31426 MCG/20ML injection As of February 3, 2020   Drug: Baclofen 2000 mcg/mL   Rate (simple continuous rate): 459.5 mcg/day   Low reservoir alarm date: 4/16/20  Managed by Dr. Martinez   2/25/2020 at Unknown time     baclofen (LIORESAL) 10 MG tablet Take 20 mg by mouth 3 times daily as needed    2/25/2020 at Unknown time     calcium carbonate (OS-LILIBETH 600 MG [place]. [place]) 600 MG tablet Take 600 mg by mouth At Bedtime    2/26/2020 at Unknown time     calcium carbonate (TUMS) 500 MG chewable tablet Take 1-2 chew tab by mouth daily as needed for heartburn   2/25/2020 at Unknown time     cholecalciferol (VITAMIN D3) 5000 units (125 mcg) capsule Take 1 capsule (5,000 Units) by mouth daily 30 capsule 11 2/25/2020 at Unknown time     diazepam (VALIUM) 2 MG tablet Take 2 mg by mouth 2 times daily as needed for muscle spasms    2/25/2020 at Unknown time     doxepin (SINEQUAN) 25 MG  capsule TAKE 1 CAPSULE BY MOUTH DAILY AT BEDTIME  99 2/25/2020 at Unknown time     FLUOXETINE HCL PO Take 60 mg by mouth At Bedtime   2/25/2020 at Unknown time     lidocaine (XYLOCAINE) 2 % external gel Apply topically daily 30 mL 11 2/25/2020 at Unknown time     medroxyPROGESTERone (DEPO-PROVERA) 150 MG/ML injection Inject 150 mg into the muscle every 3 months.   Past Month at Unknown time     morphine 0.1% in solosite gel Apply 1 gm to wound bed daily with dressing changes for one month 30 g 0 2/25/2020 at Unknown time     NEW  mg gentamicin in 1 Liter 0.9 Normal Saline.  Instill 30 ml of gentamicin solution into bladder at HS. 900 mL 11 2/25/2020 at Unknown time     nystatin (MYCOSTATIN) 890261 UNIT/GM external cream 2 times daily as needed    2/25/2020 at Unknown time     ondansetron (ZOFRAN) 4 MG tablet TAKE 1 TABLET BY MOUTH EVERY 4 HOURS AS NEEDED  99 2/25/2020 at Unknown time     oxybutynin ER (DITROPAN XL) 15 MG 24 hr tablet TAKE 1 TAB BY MOUTH AT BEDTIME  99 2/25/2020 at Unknown time     pregabalin (LYRICA) 100 MG capsule TAKE 1 CAP BY MOUTH THREE TIMES DAILY   2/25/2020 at Unknown time     pseudoePHEDrine (SUDAFED) 30 MG tablet Take 60 mg by mouth every 4 hours as needed for congestion   2/25/2020 at Unknown time     traZODone (DESYREL) 100 MG tablet TAKE 1 TAB BY MOUTH AT BEDTIME  99 2/25/2020 at Unknown time     [DISCONTINUED] ibuprofen (ADVIL/MOTRIN) 800 MG tablet TAKE 1 TABLET BY MOUTH TWICE A DAY AS NEEDED  99 2/25/2020 at Unknown time     [DISCONTINUED] oxyCODONE (ROXICODONE) 5 MG tablet TAKE 1 TO 2 TABLETS BY MOUTH EVERY 4 HOURS AS NEEDED FOR SEVERE PAIN.   2/25/2020 at Unknown time     [DISCONTINUED] sulfamethoxazole-trimethoprim (BACTRIM DS/SEPTRA DS) 800-160 MG tablet Take 1 tablet by mouth 2 times daily   0 Taking           Discharge Medications:        Review of your medicines      CONTINUE these medicines which may have CHANGED, or have new prescriptions. If we are uncertain of the  size of tablets/capsules you have at home, strength may be listed as something that might have changed.      Dose / Directions   ibuprofen 600 MG tablet  Commonly known as:  ADVIL/MOTRIN  This may have changed:      medication strength    See the new instructions.      Dose:  600 mg  Take 1 tablet (600 mg) by mouth every 6 hours as needed for moderate pain  Quantity:  12 tablet  Refills:  0     oxyCODONE 5 MG tablet  Commonly known as:  ROXICODONE  This may have changed:  See the new instructions.      Dose:  5-10 mg  Take 1-2 tablets (5-10 mg) by mouth every 6 hours as needed for moderate to severe pain  Quantity:  10 tablet  Refills:  0        CONTINUE these medicines which have NOT CHANGED      Dose / Directions   acetaminophen 500 MG tablet  Commonly known as:  TYLENOL      Dose:  1,000 mg  Take 1,000 mg by mouth 3 times daily  Refills:  0     * baclofen 10 MG tablet  Commonly known as:  LIORESAL      Dose:  20 mg  Take 20 mg by mouth 3 times daily as needed  Refills:  0     * Gablofen 25133 MCG/20ML injection  Generic drug:  baclofen      As of February 3, 2020   Drug: Baclofen 2000 mcg/mL   Rate (simple continuous rate): 459.5 mcg/day   Low reservoir alarm date: 4/16/20  Managed by Dr. Martinez  Refills:  0     calcium carbonate 500 MG chewable tablet  Commonly known as:  TUMS      Dose:  1-2 chew tab  Take 1-2 chew tab by mouth daily as needed for heartburn  Refills:  0     calcium carbonate 600 MG tablet  Commonly known as:  OS-LILIBETH 600 mg [place]. Ca      Dose:  600 mg  Take 600 mg by mouth At Bedtime  Refills:  0     cholecalciferol 5000 units (125 mcg) capsule  Commonly known as:  VITAMIN D3  Used for:  Pressure injury of left ischium, stage 4 (H)      Dose:  5,000 Units  Take 1 capsule (5,000 Units) by mouth daily  Quantity:  30 capsule  Refills:  11     diazepam 2 MG tablet  Commonly known as:  VALIUM      Dose:  2 mg  Take 2 mg by mouth 2 times daily as needed for muscle spasms  Refills:  0     doxepin 25  MG capsule  Commonly known as:  SINEquan      TAKE 1 CAPSULE BY MOUTH DAILY AT BEDTIME  Refills:  99     FLUOXETINE HCL PO      Dose:  60 mg  Take 60 mg by mouth At Bedtime  Refills:  0     lidocaine 2 % external gel  Commonly known as:  XYLOCAINE  Used for:  Pressure injury of sacral region, stage 4 (H)      Apply topically daily  Quantity:  30 mL  Refills:  11     medroxyPROGESTERone 150 MG/ML IM injection  Commonly known as:  DEPO-PROVERA      Dose:  150 mg  Inject 150 mg into the muscle every 3 months.  Refills:  0     morphine 0.1% in solosite gel  Used for:  Pressure injury of left ischium, stage 4 (H)      Apply 1 gm to wound bed daily with dressing changes for one month  Quantity:  30 g  Refills:  0     NEW MED  Used for:  Recurrent UTI      480 mg gentamicin in 1 Liter 0.9 Normal Saline.  Instill 30 ml of gentamicin solution into bladder at HS.  Quantity:  900 mL  Refills:  11     nystatin 637615 UNIT/GM external cream  Commonly known as:  MYCOSTATIN      2 times daily as needed  Refills:  0     ondansetron 4 MG tablet  Commonly known as:  ZOFRAN      TAKE 1 TABLET BY MOUTH EVERY 4 HOURS AS NEEDED  Refills:  99     oxybutynin ER 15 MG 24 hr tablet  Commonly known as:  DITROPAN XL      TAKE 1 TAB BY MOUTH AT BEDTIME  Refills:  99     pregabalin 100 MG capsule  Commonly known as:  LYRICA      TAKE 1 CAP BY MOUTH THREE TIMES DAILY  Refills:  0     pseudoePHEDrine 30 MG tablet  Commonly known as:  SUDAFED      Dose:  60 mg  Take 60 mg by mouth every 4 hours as needed for congestion  Refills:  0     traZODone 100 MG tablet  Commonly known as:  DESYREL      TAKE 1 TAB BY MOUTH AT BEDTIME  Refills:  99         * This list has 2 medication(s) that are the same as other medications prescribed for you. Read the directions carefully, and ask your doctor or other care provider to review them with you.            STOP taking    sulfamethoxazole-trimethoprim 800-160 MG tablet  Commonly known as:  BACTRIM DS               Where to get your medicines      These medications were sent to [facility] - [place], [place] - [address]  [address], [facility] [address]    Phone:  [phone]     ibuprofen 600 MG tablet     Some of these will need a paper prescription and others can be bought over the counter. Ask your nurse if you have questions.    Bring a paper prescription for each of these medications    oxyCODONE 5 MG tablet                Consultations:   Urology          Brief History of Illness:   Marsha Mcneill is a 44 year old with neurogenic bowel and bladder secondary to MS who was found to have a pelvic mass pushing against her augmented bladder on imaging for her pressure ulcer. She was also noting recurrent UTIs and leakage from her stoma. Given these findings, surgical intervention with removal of the mass and inspection of her ileocystoplasty was recommended. Surgical consent was signed.           Hospital Course:   She underwent the above stated procedure.  The procedure was uncomplicated with an EBL of 20 cc.  For full details of the procedure, please see her operative note for details.  The remainder of her hospital course is detailed below in a problem based manner.    Dz:   - Preoperative diagnosis was a pelvic mass. Frozen pathology was a benign dermoid cyst.  Final pathology is pending at the time of discharge.  She will follow-up postoperatively   FEN:   - She was maintained on IVF until POD#1. She was started on a regular diet postoperatively. By discharge, she was tolerating a regular diet without nausea and vomiting and able to maintain her hydration without IVF supplementation.    Pain:   - Her pain was initially controlled on tylenol, ibuprofen and oxycodone with IV dilaudid available for breakthrough pain.  At the time of discharge her pain was controlled with tylenol, ibuprofen, and oxycodone and she was discharged home with these meds.  She was continued on her prior to  admission Lyrica.  CV:   - She had hypotension post-operatively without tachycardia and no signs of hypoperfusion. Her blood pressure improved to her baseline 90s-100s/50s-60s. Her vital signs were stable while hospitalized and she had no acute CV issues.  PULM:   - She has no history of pulmonary issues.  She was initially given O2 supplementation in to maintain her O2 sats in the immediate postop period and was transitioned off of this without difficulty.  By discharge, her O2 sats were greater than 90% on RA.  She was encouraged to use her bedside IS while in house.  She had no acute pulmonary issues while in house.  HEME:   - Her preoperative Hgb was 15.2 with an EBL of 20.  Her hgb dropped to 12.0 and was stable at 11.2 at the time of discharge.   GI:   - She was made NPO prior to the procedure.  Her diet was advanced to to a regular diet postoperatively.  Her ileostomy was functioning well throughout her hospital stay. Care facility to continue abd precautions.  :    -A Meléndez catheter was placed into her umbilical stoma at the time of surgery. She had irrigation of her meléndez on POD#1 by urology and it was removed on POD#2. She continued her home gentamycin flushes daily. She returned to intermittent catheterization which was done by nursing staff. She was continued on her home oxybutynin.   ID:   - The patient was afebrile during her hospitalization.  She received standard preoperative antibiotics without incident.    Skin: She has a known left hip pressure ulcer.  Dressing changes were done while hospitalized and she was seen by [facility] nurse. SNF to remove staples 10-14 days post op and apply steri strips.  ENDO:   - Pt has no endocrine issues while hospitalized.   PSYCH/NEURO:   - No acute issues. Hx MS continued on her home meds.  MSK:   - She was continued on her home baclofen pump and oral baclofen while hospitalized.   PPX:    -  She was given SCDs, IS, and lovenox during her hospital course. She  tolerated these prophylactic interventions without incident. They were discontinued at the time of her discharge.            Discharge Instructions and Follow-Up:   Discharge diet: Regular   Discharge activity: Activity as tolerated   Discharge follow-up: 3/9/2020 Dr Barron; staple removal with primary physician in 11-14 days             Discharge Disposition:   Discharged to assisted living enter (home)      Wes Storm MD  OB/GYN Resident, PGY-2  2/29/2020 8:18 AM        Provider Disclosure:   I agree with above History, Review of Systems, Physical exam and Plan. I have reviewed the content of the documentation and have edited it as needed. I have personally performed the services documented here and the documentation accurately represents those services and the decisions I have made.     Electronically signed by:   Maribel Toribio MD   Gynecologic Oncology   [facility]

## 2020-02-26 NOTE — PROGRESS NOTES
"Gynecologic Oncology Postoperative Check Note  2/26/2020    S: Having a little upper abdominal distention, otherwise not having pain. Notes that tongue has felt numb since surgery. Drinking without difficulty. Clarified how she functions at baseline, only has motor function of left arm. Gross sensation intact. Lives in a nursing home. Gets around via motorized wheelchair.     O:  Patient Vitals for the past 24 hrs:   BP Temp Temp src Pulse Heart Rate Resp SpO2 Height Weight   02/26/20 1642 96/69 -- -- 83 83 17 95 % -- --   02/26/20 1500 98/66 -- -- 80 -- 14 94 % -- --   02/26/20 1400 95/58 -- -- 79 -- 11 94 % -- --   02/26/20 1354 101/58 -- -- 94 -- 15 94 % -- --   02/26/20 1318 100/61 97.6  F (36.4  C) Oral 87 -- 11 94 % -- --   02/26/20 1230 108/74 97.4  F (36.3  C) Oral 87 86 13 97 % -- --   02/26/20 1215 113/77 -- -- -- 96 12 97 % -- --   02/26/20 1200 111/80 -- -- 87 87 12 98 % -- --   02/26/20 1145 114/77 -- -- -- 85 12 99 % -- --   02/26/20 1130 123/77 97.3  F (36.3  C) Oral 85 88 14 100 % -- --   02/26/20 1115 117/81 -- -- -- 90 12 100 % -- --   02/26/20 1100 (!) 126/93 97  F (36.1  C) Oral 97 98 14 100 % -- --   02/26/20 1045 (!) 120/94 -- -- 106 107 -- 99 % -- --   02/26/20 0554 105/73 97.8  F (36.6  C) Oral 91 -- 15 100 % 1.702 m (5' 7\") 71.7 kg (158 lb)     Gen: alert and oriented, resting in bed  Cardio: RRR  Resp: lungs CTAB, no wheezes or crackles  Abdomen: nondistended upper abdomen, nontender, baclofen pump palpable over right upper abdomen, colostomy with gas and brown stool output, catheter in place though umbilical stoma  Incision: vertical midline incision with dressing in place, small amount of shadowing at base    Extremities: BLEs non-tender with SCDs in place, bilateral LE edema    I/O last 3 completed shifts:  In: 1347.92 [P.O.:300; I.V.:1047.92]  Out: 630 [Urine:610; Blood:20]    A: 44 year old POD#0 s/p XL, right oophorectomy, bilateral salpingectomy. Doing well postoperatively.    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

## 2020-02-26 NOTE — PROGRESS NOTES
Admitted/transferred from:   2 RN skin assessment completed by Radha Melgoza RN and Becki Michael.  Skin assessment finding left buttock wound(pre-exiting) covered with Mepilix.  Interventions/actions: WOC consult ordered, turn every 2 hours.     Will continue to monitor.

## 2020-02-26 NOTE — PROGRESS NOTES
Gynecologic Oncology Progress Note  2/27/2020    S: Didn't sleep well overnight due to cares and noise. Tired this AM. Feeling some pain over her lower abdomen, taking ibuprofen and tylenol. Ate dinner last night without vomiting. Bernal in place with low urine output since gentamicin irrigation. Hoping to discharge to home tomorrow.     O:  Patient Vitals for the past 12 hrs:   BP Temp Temp src Pulse Heart Rate Resp SpO2   02/27/20 0606 -- -- -- -- -- 12 93 %   02/27/20 0554 (!) 78/40 -- -- -- -- -- --   02/27/20 0542 (!) 84/44 -- -- -- 77 -- --   02/27/20 0532 (!) 79/42 -- -- -- -- -- --   02/27/20 0530 (!) 77/38 -- -- -- -- -- --   02/27/20 0200 -- -- -- -- 76 14 93 %   02/26/20 2255 93/58 98.6  F (37  C) Oral 76 76 16 93 %   BP low but suspect low baseline and just got pain meds, no clinical signs of anemia or hypoperfusion    Gen: alert and oriented, sitting up in bed  Cardio: RRR  Resp: lungs CTAB, no wheezes or crackles, not on oxygen  Abdomen: nondistended upper abdomen, nontender, baclofen pump palpable over right upper abdomen, colostomy with gas and brown stool output, catheter in place though umbilical stoma, plan to flush this AM  Incision: vertical midline incision with dressing in place, small amount of shadowing at base    Extremities: BLEs non-tender with SCDs in place, bilateral LE edema    Intake/Output (24 Hrs // Since [place])   mL // not recorded  IVF 1047 mL // not recorded      mL // not charted but approx 20 ml in bag  Stool: 150 in ileostomy since [place]    Labs:   AM CBC, BMP pending    A: 44 year old POD#1 s/p XL, right oophorectomy, bilateral salpingectomy. Hypotensive this AM, but not symptomatic and without tachycardia. Labs pending.     Plan:  Dz: frozen path: benign teratoma  FEN: Regular diet,  mL/hr, discontinue once UOP improves  Pain:  tylenol, ibuprofen, oxy, dilaudid PRN Chronic pain: home lyrica  Heme: Hgb 13.9>EBL 20>AM CBC pending; Hx subclavian thrombosis, on  prophylactic lovenox while hospitalized  Pulm: Capnography in place   GI: Ileostomy, no stool softeners, antiemetics PRN  : Meléndez in place through Erik bladder stoma, gentamicin irrigation at bedtime, flush with 10 ml of NS this AM; plan to remove today and return to PTA straight caths, baclofen pump, baclofen TID, oxybutynin; GERD tums PRN  ID: Hx MRSA osteomyelitis, 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 I have reviewed the content of the documentation and have edited it as needed. I have seen and personally performed the services documented here and the documentation accurately represents those services and the decisions I have made.     Electronically signed by:   Elliott Castillo MD   Gynecologic Oncology   [facility]

## 2020-02-26 NOTE — OR NURSING
Dr. Barron at the bedside around 11:15 to assess patient and speak with her about the surgery.     Dr. Barron paged around 1145 to update family when able to do so.

## 2020-02-26 NOTE — PLAN OF CARE
Patient arrived to unit 7c at 1315, post-op exploratory lap, abdominal dressing with snall amount of dried drainage which is marked,Ileostomy with small amount of stool, meléndez catheter in Erik draining rosalie urine, tolerating small amounts of clear liquids. Alert and oriented x4, minimal upper body control, unable to move lower extremities,patient is wheel chair bound, uses mechanical lift to transfer.Left buttock dressing with small amount of drainage on Mepilix.Denies need for pain medications at this time, took Tylenol and Ibuprofen at 1215 in PACU.

## 2020-02-26 NOTE — ADDENDUM NOTE
Addendum  created 02/26/20 1430 by Ave Rojas MD    Child order released for a procedure order, Clinical Note Signed, Intraprocedure Blocks edited, Intraprocedure Meds edited, LDA created via procedure documentation, Result filed

## 2020-02-26 NOTE — BRIEF OP NOTE
[facility]  Brief Operative Note      Name: Marsha Mcneill  MRN: [id number]  : 1975  Date of surgery: 2020    Preoperative diagnosis:  - Pelvic mass  - Neurogenic bowel with ileostomy  - Neuogenic bladder with ileocystoplasty  - Pressure sores  - Chronic pain   - Multiple scleroses    Postoperative diagnosis:  - Same as above    Procedure(s):  - Exploratory laparotomy, right oophorectomy, bilateral salpingectomy    Surgeon: Tommie Barron MD  Fellow: TIO Conley  Resident: Lory Donahue MD, PGY4    Anesthesia: GETA  EBL: 20 mL   Urine output: 60 mL  IV fluids: 700 mL    Specimens: Right ovary and fallopian tube, left fallopian tube  On frozen pathology: benign teratoma    Findings: Intact appearing bladder augmentation. Right ovary with dermoid appearing cyst. Normal appearing left ovary, bilateral fallopian tubes and uterus. Bladder backfilled with no leaks.     Complications: None apparent    Lory Donahue MD  Ob/Gyn, PGY4

## 2020-02-26 NOTE — ANESTHESIA CARE TRANSFER NOTE
Patient: Marsha Mcneill    Procedure(s):  Laparotomy, removal of pelvic mass, both fallopian tubes,  possible cancer operation.    Diagnosis: Ovarian mass [N83.8]  Diagnosis Additional Information: No value filed.    Anesthesia Type:   No value filed.     Note:  Airway :Face Mask  Patient transferred to:PACU  Comments:   -on 6L O2 via FM, Pt Spont. breathing, awake & responds to verbal stimuli, monitors placed, VSS, RN at bedside, no airway      Vitals: (Last set prior to Anesthesia Care Transfer)    CRNA VITALS  2/26/2020 1011 - 2/26/2020 1049      2/26/2020             Resp Rate (observed):  14    EKG:  Sinus rhythm                Electronically Signed By: OLIVIA Gomez CRNA  February 26, 2020  10:49 AM

## 2020-02-26 NOTE — ANESTHESIA PROCEDURE NOTES
Peripheral Nerve Block Procedure Note    Staff:     Anesthesiologist:  Kam Mckeon MD    Resident/CRNA:  Ave Rojas MD    Block performed by resident/CRNA in the presence of a teaching physician    Location: Pre-op  Procedure Start/Stop TImes:      2/26/2020 7:00 AM     2/26/2020 7:10 AM    patient identified, IV checked, site marked, risks and benefits discussed, informed consent, monitors and equipment checked, pre-op evaluation, at physician/surgeon's request and post-op pain management      Correct Patient: Yes      Correct Position: Yes      Correct Site: Yes      Correct Procedure: Yes      Correct Laterality:  Yes    Site Marked:  Yes  Procedure details:     Procedure:  TAP    ASA:  3    Diagnosis:  Post operative pain    Laterality:  Bilateral    Position:  Supine    Sterile Prep: chloraprep, mask and sterile gloves      Local skin infiltration:  None    Needle:  Short bevel    Needle gauge:  21    Needle length (mm):  110    Ultrasound: Yes      Ultrasound used to identify targeted nerve, plexus, or vascular structure and placed a needle adjacent to it      Permanent Image entered into patiient's record      Abnormal pain on injection: No      Blood Aspirated: No      Paresthesias:  No    Bleeding at site: No      Bolus via:  Needle    Infusion Method:  Single Shot    Complications:  None  Assessment/Narrative:     Injection made incrementally with aspirations every (mL):  5

## 2020-02-27 LAB
ANION GAP SERPL CALCULATED.3IONS-SCNC: 5 MMOL/L (ref 3–14)
BUN SERPL-MCNC: 11 MG/DL (ref 7–30)
CALCIUM SERPL-MCNC: 8.8 MG/DL (ref 8.5–10.1)
CHLORIDE SERPL-SCNC: 110 MMOL/L (ref 94–109)
CO2 SERPL-SCNC: 23 MMOL/L (ref 20–32)
COPATH REPORT: NORMAL
CREAT SERPL-MCNC: 0.36 MG/DL (ref 0.52–1.04)
ERYTHROCYTE [DISTWIDTH] IN BLOOD BY AUTOMATED COUNT: 12 % (ref 10–15)
GFR SERPL CREATININE-BSD FRML MDRD: >90 ML/MIN/{1.73_M2}
GLUCOSE SERPL-MCNC: 105 MG/DL (ref 70–99)
HCT VFR BLD AUTO: 37.7 % (ref 35–47)
HGB BLD-MCNC: 11.6 G/DL (ref 11.7–15.7)
HGB BLD-MCNC: 12 G/DL (ref 11.7–15.7)
MCH RBC QN AUTO: 30.2 PG (ref 26.5–33)
MCHC RBC AUTO-ENTMCNC: 31.8 G/DL (ref 31.5–36.5)
MCV RBC AUTO: 95 FL (ref 78–100)
PLATELET # BLD AUTO: 211 10E9/L (ref 150–450)
POTASSIUM SERPL-SCNC: 3.8 MMOL/L (ref 3.4–5.3)
RBC # BLD AUTO: 3.97 10E12/L (ref 3.8–5.2)
SODIUM SERPL-SCNC: 139 MMOL/L (ref 133–144)
WBC # BLD AUTO: 9.5 10E9/L (ref 4–11)

## 2020-02-27 PROCEDURE — 85018 HEMOGLOBIN: CPT | Performed by: STUDENT IN AN ORGANIZED HEALTH CARE EDUCATION/TRAINING PROGRAM

## 2020-02-27 PROCEDURE — 25000128 H RX IP 250 OP 636: Performed by: OBSTETRICS & GYNECOLOGY

## 2020-02-27 PROCEDURE — 25800030 ZZH RX IP 258 OP 636: Performed by: OBSTETRICS & GYNECOLOGY

## 2020-02-27 PROCEDURE — G0463 HOSPITAL OUTPT CLINIC VISIT: HCPCS

## 2020-02-27 PROCEDURE — 12000001 ZZH R&B MED SURG/OB UMMC

## 2020-02-27 PROCEDURE — 25000132 ZZH RX MED GY IP 250 OP 250 PS 637: Performed by: STUDENT IN AN ORGANIZED HEALTH CARE EDUCATION/TRAINING PROGRAM

## 2020-02-27 PROCEDURE — 25000125 ZZHC RX 250: Performed by: STUDENT IN AN ORGANIZED HEALTH CARE EDUCATION/TRAINING PROGRAM

## 2020-02-27 PROCEDURE — 99024 POSTOP FOLLOW-UP VISIT: CPT | Performed by: OBSTETRICS & GYNECOLOGY

## 2020-02-27 PROCEDURE — 80048 BASIC METABOLIC PNL TOTAL CA: CPT | Performed by: STUDENT IN AN ORGANIZED HEALTH CARE EDUCATION/TRAINING PROGRAM

## 2020-02-27 PROCEDURE — 25000125 ZZHC RX 250

## 2020-02-27 PROCEDURE — 25800030 ZZH RX IP 258 OP 636: Performed by: STUDENT IN AN ORGANIZED HEALTH CARE EDUCATION/TRAINING PROGRAM

## 2020-02-27 PROCEDURE — 85027 COMPLETE CBC AUTOMATED: CPT | Performed by: STUDENT IN AN ORGANIZED HEALTH CARE EDUCATION/TRAINING PROGRAM

## 2020-02-27 PROCEDURE — 36415 COLL VENOUS BLD VENIPUNCTURE: CPT | Performed by: STUDENT IN AN ORGANIZED HEALTH CARE EDUCATION/TRAINING PROGRAM

## 2020-02-27 PROCEDURE — 25000128 H RX IP 250 OP 636: Performed by: STUDENT IN AN ORGANIZED HEALTH CARE EDUCATION/TRAINING PROGRAM

## 2020-02-27 RX ORDER — MAGNESIUM HYDROXIDE 1200 MG/15ML
LIQUID ORAL
Status: COMPLETED
Start: 2020-02-27 | End: 2020-02-27

## 2020-02-27 RX ADMIN — SODIUM CHLORIDE 1000 ML: 900 IRRIGANT IRRIGATION at 22:23

## 2020-02-27 RX ADMIN — SODIUM CHLORIDE, POTASSIUM CHLORIDE, SODIUM LACTATE AND CALCIUM CHLORIDE: 600; 310; 30; 20 INJECTION, SOLUTION INTRAVENOUS at 05:23

## 2020-02-27 RX ADMIN — PREGABALIN 100 MG: 75 CAPSULE ORAL at 14:36

## 2020-02-27 RX ADMIN — DIAZEPAM 2 MG: 2 TABLET ORAL at 06:00

## 2020-02-27 RX ADMIN — GENTAMICIN SULFATE: 40 INJECTION, SOLUTION INTRAMUSCULAR; INTRAVENOUS at 22:23

## 2020-02-27 RX ADMIN — OXYBUTYNIN CHLORIDE 15 MG: 5 TABLET, EXTENDED RELEASE ORAL at 22:23

## 2020-02-27 RX ADMIN — ACETAMINOPHEN 650 MG: 325 TABLET, FILM COATED ORAL at 12:03

## 2020-02-27 RX ADMIN — SODIUM CHLORIDE, POTASSIUM CHLORIDE, SODIUM LACTATE AND CALCIUM CHLORIDE 500 ML: 600; 310; 30; 20 INJECTION, SOLUTION INTRAVENOUS at 06:02

## 2020-02-27 RX ADMIN — IBUPROFEN 600 MG: 600 TABLET ORAL at 17:59

## 2020-02-27 RX ADMIN — ACETAMINOPHEN 650 MG: 325 TABLET, FILM COATED ORAL at 01:50

## 2020-02-27 RX ADMIN — PREGABALIN 100 MG: 75 CAPSULE ORAL at 20:14

## 2020-02-27 RX ADMIN — FLUOXETINE 60 MG: 20 CAPSULE ORAL at 22:23

## 2020-02-27 RX ADMIN — IBUPROFEN 600 MG: 600 TABLET ORAL at 01:50

## 2020-02-27 RX ADMIN — IBUPROFEN 600 MG: 600 TABLET ORAL at 12:07

## 2020-02-27 RX ADMIN — ONDANSETRON 4 MG: 4 TABLET, ORALLY DISINTEGRATING ORAL at 05:38

## 2020-02-27 RX ADMIN — ACETAMINOPHEN 650 MG: 325 TABLET, FILM COATED ORAL at 05:37

## 2020-02-27 RX ADMIN — LIDOCAINE HYDROCHLORIDE: 20 JELLY TOPICAL at 09:52

## 2020-02-27 RX ADMIN — PREGABALIN 100 MG: 75 CAPSULE ORAL at 08:17

## 2020-02-27 RX ADMIN — ENOXAPARIN SODIUM 40 MG: 40 INJECTION SUBCUTANEOUS at 20:14

## 2020-02-27 RX ADMIN — DOXEPIN HYDROCHLORIDE 25 MG: 25 CAPSULE ORAL at 22:23

## 2020-02-27 RX ADMIN — TRAZODONE HYDROCHLORIDE 100 MG: 50 TABLET ORAL at 22:23

## 2020-02-27 RX ADMIN — ACETAMINOPHEN 650 MG: 325 TABLET, FILM COATED ORAL at 17:59

## 2020-02-27 RX ADMIN — IBUPROFEN 600 MG: 600 TABLET ORAL at 05:38

## 2020-02-27 ASSESSMENT — ACTIVITIES OF DAILY LIVING (ADL)
ADLS_ACUITY_SCORE: 39

## 2020-02-27 ASSESSMENT — PAIN DESCRIPTION - DESCRIPTORS
DESCRIPTORS: SORE

## 2020-02-27 NOTE — DISCHARGE INSTRUCTIONS
[facility]     Name: Marsha Mcneill : 1975  Date: 2020    To order your ostomy supplies    The ostomy Supplier needs this supply list  to process your order. You will need to fax/deliver this list, along with your Insurance information. Your home care nurse can assist with this process.             List of Ostomy Distributors        [facility]  Ph. [phone] Fax # [phone]  [facility] [employer]   Ph. [phone]  [employer] [facility]   Ph. [phone]  [facility]   Ph. [phone]  Or Call your insurance provider for their preferred supplier    Your Medical Supplier will need your surgeon's name, phone and fax number    Clinic:                     Phone                            Fax  Gynecology Oncology:             [phone]    Verbal Order for ostomy supplies for 1 Month per:       Shahida Gonzales RN, BSN, CWON    Authorizing MD: Tommie Barron MD    Change pouch: Three times a week  Quantity of pouches- 20 pouches/month    Request the following supplies:      Mancos    2 piece convex wafer 44mm #96081    Fecal pouch 44mm- # [id number]    Accessories  2  barrier ring #[id number]     Adapt powder #7906    No sting film barrier # 3344                          Aquacel AG hydrofiber dressing # [id number]            Change your pouch three times a week and as needed for leakage    . Call the Ostomy Nurse at [facility]       [address] [place] [address] : [phone]       Ileostomy care: three times weekly and prn leakage:  Gather supplies:  Convex two piece fecal pouch 44mm  Adapt moldable barrier ring   Stoma powder   Cavilon barrier film  Aquacel AG      1. Remove pouch, cleanse skin around stoma with saline or tap water and dry  2. Ensure to irrigate tunneling wound at 9 o clock with saline and dry  3. Apply stoma powder to irritated skin around stoma, dust off excess, then  apply Cavilon barrier film over powder to create a crusting, can repeat application a second time if needed to create a dry pouching surface  4. Cut a strip of Aquacel AG and tuck into wound at 9 o clock, ensuring to leave a long tail for easy removal  5. Apply adapt barrier ring to skin around stoma ensuring to place over Aquacel AG tail  6. Cut out convex 44mm wafer to 22mm and place around stoma  7. Attach pouch to wafer

## 2020-02-27 NOTE — PROGRESS NOTES
[facility] Nurse Inpatient Pressure Injury Assessment   Reason for consultation: Evaluate and treat ileostomy and L hip wound       ASSESSMENT  Pressure Injury: on L ischial tuberosity  , present on admission ,   Pressure Injury is Stage 4   Contributing factor of the pressure injury: pressure and immobility  Status: initial assessment    Patient with established ileostomy with peristomal MASD and tunneling wound at 9 o clock     TREATMENT PLAN  L ischial tuberosity wound: Daily  apply lidocaine gel per MAR and leave on for 10-15 minutes. Remove packing and cleanse wound with microKlenz and dry, apply Cavilon barrier film to skin around wound and allow to dry. Lightly pack with a strip of Aquacel AG (392709), ensuring to leave a tail outside of wound for easy removal. Cover with mepilex.     Ileostomy care: three times weekly and prn leakage:  Gather supplies:  Convex two piece fecal pouch 44mm: wafer (197719), pouch (766621)  Adapt moldable barrier ring (914025)  Stoma powder (802472)  Cavilon barrier film  Aquacel AG (380499)    1. Remove pouch, cleanse skin around stoma with saline or tap water and dry  2. Ensure to irrigate tunneling wound at 9 o clock with saline and dry  3. Apply stoma powder to irritated skin around stoma, dust off excess, then apply Cavilon barrier film over powder to create a crusting, can repeat application a second time if needed to create a dry pouching surface  4. Cut a strip of Aquacel AG and tuck into wound at 9 o clock, ensuring to leave a long tail for easy removal  5. Apply adapt barrier ring to skin around stoma ensuring to place over Aquacel AG tail  6. Cut out convex 44mm wafer to 22mm and place around stoma  7. Attach pouch to wafer    Orders Written  WO Nurse follow-up plan:weekly  Nursing to notify the Provider(s) and re-consult the WO Nurse if wound(s) deteriorates or new skin concern.    Patient History  According to provider note(s):  44 year old POD#1 s/p XL, right  oophorectomy, bilateral salpingectomy    Objective Data  Containment of urine/stool: Ileostomy pouch    Current Diet/ Nutrition:  Orders Placed This Encounter      Regular Diet Adult      Advance Diet as Tolerated      Output:   I/O last 3 completed shifts:  In: 2956 [P.O.:956; I.V.:2000]  Out: 1275 [Urine:950; Stool:325]    Risk Assessment:   Sensory Perception: 4-->no impairment  Moisture: 3-->occasionally moist  Activity: 2-->chairfast  Mobility: 1-->completely immobile  Nutrition: 3-->adequate  Friction and Shear: 1-->problem  Braulio Score: 14      Labs:   Recent Labs   Lab 02/27/20  1256 02/27/20  0620   HGB 11.6* 12.0   WBC  --  9.5       Physical Exam  Skin inspection: focused L IT and abdomen  Patient is high risk for pressure injury development secondary to quadriplegia      2/27    Wound Location:  L  IT  Date of last Photo 2/27  Wound History: chronic PI for 2 years per patient, has plans to follow up with wound clinic in march  Measurements (length x width x depth, in cm) 3 cm x 2 cm  x  1 cm   Wound Base:  100 % granulation tissue over bone  Tunneling N/A  Undermining up to 1cm circumferentially   Palpation of the wound bed: firm, directly over bone   Periwound skin: macerated and superficial erosion  Color: red  Temperature: normal   Drainage:, moderate  Description of drainage: serosanguinous  Odor: none  Pain: moderate, tender     Initial Assessment   Assessment of established end Ileostomy Stoma complication(s) none   Mucocutaneous junction; separation tunneling wound at 9 o clock   Peristomal complication(s) Moisture-Associated Skin Damage (MASD) due to leakage and wound at 9 o clock, unclear etiology, patient unaware of wound on assessment today   Pouch wear time:24-48 hours    Physical Exam:  Current pouching system:Springer soft convex fecal pouch with adapt barrier ring, aquacel in wound at 9 o clock and stoma powder/barrier film to skin around stoma  Reason for pouch change today:  leakage  Stoma appearance: viable, healthy, round and oval  Stoma size; 22mm   Peristomal skin: erythema and wound(s) present. Wound tunnels 2.5cm directly down.   Stoma output :brown, clear and liquid   Abdominal  Assessment  soft     Interventions  Current support surface: Standard  Atmos Air mattress, pulsate ordered   Current off-loading measures: Foam padding and Pillows  Repositioning aid: Pillows  Visual inspection of wound(s) completed   Wound Care: was done per plan of care.  Supplies: discussed with RN  Educated provided: plan of care  Education provided to: patient  and nurse  Discussed importance of:repositioning every 2 hours and off-loading pressure to wound  Discussed plan of care with Patient and Nurse    Shahida Gonzales RN, CWOCN

## 2020-02-27 NOTE — PROGRESS NOTES
Urology Progress Note    Interval History:  - Hypotensive overnight. (77/38 - 89/49). Low urine output despite 500cc bolus  - Patient denies bladder pain but does endorse mild abd pain.  Didn't sleep well due to IV beeping.   - At home she irrigates her bladder daily.     Physical Exam  Temp:  [97  F (36.1  C)-99  F (37.2  C)] 99  F (37.2  C)  Pulse:  [] 91  Heart Rate:  [] 77  Resp:  [11-17] 14  BP: ()/(38-94) 89/49  SpO2:  [93 %-100 %] 95 %    I/O last 3 completed shifts:  In: 2827.92 [P.O.:780; I.V.:2047.92]  Out: 980 [Urine:810; Stool:150; Blood:20]    Gen: NAD  Abd: Soft.  Appropriately tender. ND. 14Fr nonlatex Bernal in Mitrofanoff without active urine drainage.   Bernal with concentrated yellow urine + mucus in bag.  Ext: NT, no LE edema    Labs reviewed/pending  Heme:  Recent Labs   Lab 02/27/20  0620 02/26/20  0703   WBC 9.5  --    HGB 12.0 15.2     --      Chem:  Recent Labs   Lab 02/27/20  0620 02/26/20  0703   POTASSIUM 3.8 3.9   CR 0.36*  --        Assessment/Plan  44 year old female old POD#1 s/p XL, right oophorectomy, bilateral salpingectomy. Hypotensive this AM, but not symptomatic and without tachycardia.     From a Urologic standpoint she has an augmented bladder and Mitrofanoff - at home does CIC with daily saline bladder irrigations, but this became difficult presumably d/t adjacent ovarian mass.     Today:     Irrigated bladder myself with 500cc sterile NS.  Did get 400cc urine output.    Will place irrigation orders - Irrigate qshift using a 60cc catheter-tipped syringe and sterile NS.  Disconnect Bernal from the drainage tubing. Using the syringe, instill 2 syringes (120 mL) of normal saline, then withdraw one (60 mL), then instill one (60 mL), and withdraw one (60 mL)... and continue this process until there is no longer mucus being irrigated out.  Usually this will require between 120 and 300 mL of total irrigation to clear the mucus.  May irrigate PRN for increased  bladder pain/pressure, low urine output or concerns for Bernal clogging.   Continue Bernal today.  Tomorrow Urology will remove Bernal and attempt CIC along with the patient (to assure patient is able to do it given previous difficulties).   Patient seen by me.  Will discuss with Dr. Westbrook and team    Mary Villatoro PA-C  Urology Physician Assistant  Personal Pager: [phone]    Please call Job Code:   [phone] to reach the Urology resident or PA on call - Weekdays  [phone] to reach the Urology resident or PA on call - Weeknights and weekends

## 2020-02-27 NOTE — OP NOTE
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      ESTIMATED BLOOD LOSS: 20 ml      IV FLUIDS: 700 ml     URINE OUTPUT: 60 ml     DRAINS: Bernal catheter      FINDINGS: On exam under anesthesia, the cervix was grossly normal in appearance.    Intact appearing bladder augmentation. Right ovary enlarged upto 6cm with dermoid appearing cyst. Normal appearing left ovary, bilateral fallopian tubes and uterus. Bladder backfilled with no leaks.         SPECIMENS: Right ovary and fallopian tube, left fallopian tube  On frozen pathology: benign teratoma     COMPLICATIONS: none      CONDITION: Stable to PACU.      INDICATIONS: Marsha Mcneill is a 44-year-old woman with medical history significant for MS, history of subclavian DVT in 2012, neurogenic bowel with ileostomy, neurogenic bladder with ileocystoplasty with Erik, recurrent UTIs, rheumatoid arthritis, history of pressure sores/osteomyelitis, MRSA, anxiety, depression, insomnia, and chronic pain who initially presented with more difficulty with her catheterization of her Sid and thus sought medical attention. She had further work-up with an ultrasound and  MRI which showed an ovarian mass causing compression of her Sid. She was sent to the [facility] for new patient consultation and consented for the above procedures.     OPERATIVE PROCEDURE IN DETAIL: Consent was reviewed with the patient in the preoperative setting and  confirmed. She received prophylactic antibiotics. In addition, she received heparin for venous thrombosis prevention. The patient was transferred to the operating room and placed in dorsal supine position. General anesthetic was obtained in the usual manner without noted difficulties. The patient was then positioned onto Ronald stirrups and an exam under anesthesia was performed with findings as described above. Bernal catheter was then placed under sterile techniques inside her ileocystoplasty and the patient was prepped and draped for the above-mentioned procedure.      Timeout was called at which point the patient's name, procedure and operative site was confirmed by the operative team. Attention was then turned to the upper abdomen. Subsequently, a sharp vertical skin incision was made with the scalpel from the level of the pubic symphysis upto the umbilicus. Incision was carried through the subcutaneous layer with cautery. Fascia was gently incised and extended superiorly and inferiorly. Continent cecal Mitrofanoff channel was noted to be  densely adherent to the anterior abdominal wall in the midline in the lower third of the incision. Cecal conduit was gently  from anterior lower abdominal by sharp dissection. After bowel was completely freed from anterior abdominal wall and one another in a sufficient area , the Bookwalter retractor was placed and bowel gently packed away with moist sponges. After additional dissection, eventually the right ovarian cyst became visible which was displaced to the left side.    The right retroperitoneal space was opened and the right IP ligament was skeletonized, clamped, cut and ligated with two 0-Vicryl ties. Next the uteroovarian ligament was similarly transected and pedicle tied off. The right ovarian mass and fallopian tube was sent to pathology for frozen. Next attention was directed to the left adnexa and left salpingectomy was performed. The ampullary portion of  the left fallopian tube is grasped with a [place] clamp and elevated. Transection of the mesosalpinx was performed with freeing both the fimbriated end from the ovary as well as the cornual end from the uterus using an electrocautery. At this point, frozen section returned as benign teratoma. Pelvis was copiously irrigated and dissection bed was inspected for hemostasis.  All the dissection beds were noted to be hemostatic at the end. Urology evaluated the cecal conduit, neobladder backfilled with no leaks. Bernal was found to be patent and functioning.     All laparotomy sponges were removed. Two large sheets of seprafilm placed over bowel.  Fascia was closed with 0 looped PDS in 2 segments meeting in the middle. Subcutaneous tissue was irrigated and hemostasis assured. Skin was closed with staples.  The patient tolerated the procedure well. Sponge, lap, and needle counts were correct x2. The patient had sequential compression devices applied to her calves throughout the entire procedure. She was extubated and taken to PACU in stable condition.     Dr. Barron was present and scrubbed for the entire procedure.     Vivian Olivo MD  Gyn Oncology Fellow   [facility]

## 2020-02-27 NOTE — PROVIDER NOTIFICATION
Notified MD at 0530 AM regarding changes in vital signs and low urine output.      Spoke with: Joycelyn Hay MD    Orders were obtained. Irrigated meléndez catheter and administered 500ml LR bolus.     Comments: Irrigation unsuccessful to improve urine output. Urology to assess pt.

## 2020-02-27 NOTE — PROGRESS NOTES
Social Work Services Progress Note    Hospital Day: 1  Date of Initial Social Work Evaluation:  Not completed this visit.   Collaborated with:  [facility] Transportation; [facility]    Data: ABEBE contacted [facility] Transportation to set up pt ride via stretcher. ABEBE spoke with Zayra at [facility] who was able to assist SW in arranging transport for pt for tomorrow.  Zayra informed ABEBE she will set it up for 11: 00 am and will call SW back to confirm ride.    ABEBE spoke with NP Ayaka re; discharge she thinks best to plan for Saturday if facility will accept on Sat otherwise tomorrow would be fine.   ABEBE left message for SW at SNF.     [facility] [phone] notifying ABEBE that a Saturday return to facility is fine.  Natasha shared that the nursing station number for RN to RN is [phone] if needed.     ABEBE re-scheduled pts transportation with [facility] (Latricia) she stated pts ride was set up for Friday at 10 am and needed to re-schedule for Saturday morning at 11 am 02/29/2020.     ABEBE notified Natasha (SW with [facility]) the time of pts transportation and expected arrival on Saturday around 1:30/2pm. Report will need to be given to charge nurse at Station 1. Sending discharge summary and orders if sending Friday: [phone]. If sending Saturday [phone].    ABEBE notified Ayaka (JUMANA) and patient of current transportation plan.     Intervention:  Transportation for discharge planning    Assessment:  Transporation for d/c planning purposes spoke with [facility].     Plan:    Anticipated Disposition:  Facility:  back to [facility]    Barriers to d/c plan:  Medical Stability     Follow Up:  ABEBE will continue to follow for discharge planning.     Kindra Mcdonald, MSW, LGSW  [facility]   [facility]  Pager: [phone]

## 2020-02-27 NOTE — PLAN OF CARE
POD 1 laparotomy, right oophorectomy, bilateral salpingectomy (per MD note).  Hypotensive in 70s/40s, MD paged and bolus administered, OVSS on RA. Capno in place. Aox4, forgetful at times. Total cares. Chair fast, repositioned Q2hr. Midline incision dressing CDI, scant drainage. Passing stool/gas per ileostomy. Pain managed with scheduled tylenol and ibuprofen. Administered valium x1 per pt request. Erik with meléndez cath with minimal urine output overnight, MD paged and came to see pt. L PIV infusing MIVF. Continue with POC.

## 2020-02-27 NOTE — PLAN OF CARE
Hypotensive this am 80's/40, asymptomatic, GYN/Onc team aware, bp improving through out shift, 1300  b/p 93/53.Per patient baseline b/p is 100/50. Bernal catheter in [facility] drain for urine, plugged this am with mucous shreds, Urology PA irrigated at 0800, now draining well, has order to irrigate tube shift and prn.Ileostomy with loose stool, tolerating regular diet, patient needs to be fed and help ordering meals. Tylenol and Ibuprofen for abdominal incision pain with relief, denies need for other pain medications.WOC RN changed left buttock wound this am.Patient turns with two assist, cannot move lower extremities, some movement in left arm, up to chair this afternoon with ceiling lift.Possible discharge tomorrow or Saturday.

## 2020-02-27 NOTE — PROGRESS NOTES
Gynecologic Oncology Progress Note  2/28/2020    S: Didn't sleep well overnight due to cares and noise. Tired this AM. Feeling some pain over her lower abdomen, taking ibuprofen and tylenol. Ate dinner last night without vomiting. Bernal in place. Hoping to discharge to home tomorrow.     O:  Patient Vitals for the past 12 hrs:   BP Temp Temp src Pulse Heart Rate Resp SpO2   02/28/20 0750 91/63 96.6  F (35.9  C) Oral -- 75 16 95 %   02/28/20 0605 91/54 97.4  F (36.3  C) Oral -- 82 16 92 %   02/27/20 2339 104/65 97.9  F (36.6  C) Oral 78 -- 14 92 %     Gen: alert and oriented, sitting up in bed  Cardio: RRR  Resp: lungs CTAB, no wheezes or crackles, not on oxygen  Abdomen: nondistended upper abdomen, nontender, baclofen pump palpable over right upper abdomen, colostomy with gas and brown stool output, catheter in place though umbilical stoma  Incision: vertical midline incision with dressing in place, small amount of shadowing at base    Extremities: BLEs non-tender with SCDs in place, bilateral LE edema    Intake/Output (24 Hrs // Since MN)   mL // not recorded  IVF 2000 mL // not recorded     UOP 1050 mL // 565 mL  Ileostomy 650 mL // 275 mL    Labs:   Hgb 11.2    A: 44 year old POD#2 s/p XL, right oophorectomy, bilateral salpingectomy.     Plan:  Dz: frozen path: benign teratoma  FEN: Regular diet  Pain:  tylenol, ibuprofen, oxy PRN Chronic pain: home lyrica  Heme: Hgb 13.9>EBL 20>12.0>11.2; Hx subclavian thrombosis, on prophylactic lovenox while hospitalized  Pulm: NI  GI: Ileostomy, no stool softeners, antiemetics PRN  : Bernal in place through Erik bladder stoma, gentamycin irrigation at bedtime; plan to remove today with urology and return to [facility] straight caths, baclofen pump, baclofen TID, oxybutynin; GERD tums PRN  ID: Hx MRSA osteomyelitis, not currently on abx   Psych/Neuro: Insomnia: Doxepine, trazodone at bedtime; MDD, fluoxetine  MSK: MS, not ambulatory, will need to be turned by nursing to  prevent pressure ulcers: valium PRN;  Chronic LE edema, wraps PRN   Skin: Left hip pressure ulcer w/ hx osteomyelitis, lidocaine gel for dressing changes, WOC consult ordered   Ppx: SCDs, IS, Lovenox  Dispo: Anticipate discharge to nursing home on Saturday  Lines/Drains: Port (not functional), PIV, ileostomy, meléndez     Lory Donahue MD  Ob/Gyn, PGY4

## 2020-02-27 NOTE — PLAN OF CARE
"BP 96/69 (BP Location: Left arm)   Pulse 83   Temp 97.6  F (36.4  C) (Oral)   Resp 17   Ht 1.702 m (5' 7\")   Wt 71.7 kg (158 lb)   SpO2 95%   BMI 24.75 kg/m      VSS on RA. Pain controlled with scheduled Tylenol and ibuprofen and PRN oxy 5mg x1. Total cares. Turn Q2h with AO2. Erik with meléndez cath producing adequate rosalie urine. Tolerating reg diet with total feeding assistance. Ileostomy with soft brown output. Old pressure sore on L buttock dressing change completed by this RN. Blisters present around diameter of wound. PIV infusing MIVF. Pt resting comfortably between cares. Continue POC.   "

## 2020-02-28 LAB — HGB BLD-MCNC: 11.2 G/DL (ref 11.7–15.7)

## 2020-02-28 PROCEDURE — 25000128 H RX IP 250 OP 636: Performed by: STUDENT IN AN ORGANIZED HEALTH CARE EDUCATION/TRAINING PROGRAM

## 2020-02-28 PROCEDURE — 25000125 ZZHC RX 250: Performed by: STUDENT IN AN ORGANIZED HEALTH CARE EDUCATION/TRAINING PROGRAM

## 2020-02-28 PROCEDURE — 12000001 ZZH R&B MED SURG/OB UMMC

## 2020-02-28 PROCEDURE — 25800030 ZZH RX IP 258 OP 636: Performed by: OBSTETRICS & GYNECOLOGY

## 2020-02-28 PROCEDURE — 99024 POSTOP FOLLOW-UP VISIT: CPT | Performed by: OBSTETRICS & GYNECOLOGY

## 2020-02-28 PROCEDURE — 25000128 H RX IP 250 OP 636: Performed by: OBSTETRICS & GYNECOLOGY

## 2020-02-28 PROCEDURE — 25000132 ZZH RX MED GY IP 250 OP 250 PS 637: Performed by: STUDENT IN AN ORGANIZED HEALTH CARE EDUCATION/TRAINING PROGRAM

## 2020-02-28 PROCEDURE — 25000132 ZZH RX MED GY IP 250 OP 250 PS 637: Performed by: NURSE PRACTITIONER

## 2020-02-28 PROCEDURE — 36415 COLL VENOUS BLD VENIPUNCTURE: CPT | Performed by: STUDENT IN AN ORGANIZED HEALTH CARE EDUCATION/TRAINING PROGRAM

## 2020-02-28 PROCEDURE — 85018 HEMOGLOBIN: CPT | Performed by: STUDENT IN AN ORGANIZED HEALTH CARE EDUCATION/TRAINING PROGRAM

## 2020-02-28 RX ORDER — IBUPROFEN 600 MG/1
600 TABLET, FILM COATED ORAL EVERY 6 HOURS PRN
Qty: 12 TABLET | Refills: 0 | Status: SHIPPED | OUTPATIENT
Start: 2020-02-28

## 2020-02-28 RX ORDER — OXYCODONE HYDROCHLORIDE 5 MG/1
5-10 TABLET ORAL EVERY 6 HOURS PRN
Qty: 10 TABLET | Refills: 0 | Status: SHIPPED | OUTPATIENT
Start: 2020-02-28 | End: 2020-06-16

## 2020-02-28 RX ORDER — OXYCODONE HYDROCHLORIDE 5 MG/1
5-10 TABLET ORAL EVERY 6 HOURS PRN
Status: DISCONTINUED | OUTPATIENT
Start: 2020-02-28 | End: 2020-02-29 | Stop reason: HOSPADM

## 2020-02-28 RX ADMIN — ACETAMINOPHEN 650 MG: 325 TABLET, FILM COATED ORAL at 12:15

## 2020-02-28 RX ADMIN — IBUPROFEN 600 MG: 600 TABLET ORAL at 00:07

## 2020-02-28 RX ADMIN — OXYBUTYNIN CHLORIDE 15 MG: 5 TABLET, EXTENDED RELEASE ORAL at 21:18

## 2020-02-28 RX ADMIN — ACETAMINOPHEN 650 MG: 325 TABLET, FILM COATED ORAL at 00:07

## 2020-02-28 RX ADMIN — IBUPROFEN 600 MG: 600 TABLET ORAL at 05:58

## 2020-02-28 RX ADMIN — LIDOCAINE HYDROCHLORIDE: 20 JELLY TOPICAL at 09:30

## 2020-02-28 RX ADMIN — ACETAMINOPHEN 650 MG: 325 TABLET, FILM COATED ORAL at 18:24

## 2020-02-28 RX ADMIN — PREGABALIN 100 MG: 75 CAPSULE ORAL at 08:36

## 2020-02-28 RX ADMIN — GENTAMICIN SULFATE: 40 INJECTION, SOLUTION INTRAMUSCULAR; INTRAVENOUS at 21:19

## 2020-02-28 RX ADMIN — ACETAMINOPHEN 650 MG: 325 TABLET, FILM COATED ORAL at 05:58

## 2020-02-28 RX ADMIN — OXYCODONE HYDROCHLORIDE 10 MG: 5 TABLET ORAL at 17:12

## 2020-02-28 RX ADMIN — OXYCODONE HYDROCHLORIDE 10 MG: 5 TABLET ORAL at 10:22

## 2020-02-28 RX ADMIN — PREGABALIN 100 MG: 75 CAPSULE ORAL at 14:04

## 2020-02-28 RX ADMIN — PREGABALIN 100 MG: 75 CAPSULE ORAL at 19:52

## 2020-02-28 RX ADMIN — ENOXAPARIN SODIUM 40 MG: 40 INJECTION SUBCUTANEOUS at 19:52

## 2020-02-28 RX ADMIN — IBUPROFEN 600 MG: 600 TABLET ORAL at 12:15

## 2020-02-28 RX ADMIN — TRAZODONE HYDROCHLORIDE 100 MG: 50 TABLET ORAL at 21:18

## 2020-02-28 RX ADMIN — DOXEPIN HYDROCHLORIDE 25 MG: 25 CAPSULE ORAL at 21:18

## 2020-02-28 RX ADMIN — OXYCODONE HYDROCHLORIDE 10 MG: 5 TABLET ORAL at 04:34

## 2020-02-28 RX ADMIN — FLUOXETINE 60 MG: 20 CAPSULE ORAL at 21:18

## 2020-02-28 RX ADMIN — IBUPROFEN 600 MG: 600 TABLET ORAL at 18:24

## 2020-02-28 ASSESSMENT — ACTIVITIES OF DAILY LIVING (ADL)
ADLS_ACUITY_SCORE: 39

## 2020-02-28 ASSESSMENT — MIFFLIN-ST. JEOR: SCORE: 1382.09

## 2020-02-28 ASSESSMENT — PAIN DESCRIPTION - DESCRIPTORS
DESCRIPTORS: SORE

## 2020-02-28 NOTE — PROGRESS NOTES
Social Work Services Progress Note    Hospital Day: 2  Date of Initial Social Work Evaluation:  Not completed  Collaborated with:  Chart review, [facility] Admissions    Data: Pt is 44 year old POD#2 s/p XL, right oophorectomy, bilateral salpingectomy.  SW involved for discharge planning - Pt resides at [facility] in [place] - P: [phone].    Per Gyn/Onc team, it is anticipated Pt will be medically ready to return to LTC tomorrow (Sat 2/29). In anticipation of discharge, SW scheduled a stretcher ride for 11 AM on 2/29 via [employer] (Ph: [phone]). PCS form completed and in patient's chart. SW notified Admissions/Natasha (P: [phone]) confirming they are still able to accept over the weekend.  No PAS referral needed d/t returning to LT.    For Wknd Admissions, call the main number P: [phone] and fax to F: [phone]. For nurse to nurse report, please call [phone] at Nursing Station 1.    Intervention:  Discharge planning    Assessment:  See bedside notes, medical team notes, WOC notes    Plan:    Anticipated Disposition:  Facility:  [facility]    Barriers to d/c plan:  Medical stability    Follow Up:  SW to f/u & assist as needed.    LOBO Buckley, CONNIE  7C Surgical/Oncology Unit   Phone: [phone]  Pager: [phone]

## 2020-02-28 NOTE — PROGRESS NOTES
Gynecologic Oncology Progress Note  2/28/2020    S: Doing ok this AM. Eating w/o nausea. Bernal in place. Pain controlled with oral meds.     O:  Patient Vitals for the past 12 hrs:   BP Temp Temp src Pulse Heart Rate Resp SpO2   02/28/20 0605 91/54 97.4  F (36.3  C) Oral -- 82 16 92 %   02/27/20 2339 104/65 97.9  F (36.6  C) Oral 78 -- 14 92 %     Gen: alert and oriented, sitting up in bed  Cardio: RRR  Resp: lungs CTAB  Abdomen: nontender, colostomy with gas and brown stool output, catheter in place though umbilical stoma  Incision: vertical midline incision, dressing removed, dry with staples reapproximating well  Extremities: BLEs non-tender with SCDs in place, bilateral LE edema    Intake/Output (24 Hrs // Since [place])   mL // not recorded  IVF 2000 mL // not recorded     UOP 1050 mL // 265 mL  Stool 650 mL // 275 mL    Labs:   AM hgb pending    A: 44 year old POD#2 s/p XL, right oophorectomy, bilateral salpingectomy.     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   Psych/Neuro: Insomnia: Doxepine, trazodone at bedtime; MDD, fluoxetine  MSK: MS, not ambulatory, will need to be turned by nursing to prevent pressure ulcers: valium PRN;  Chronic LE edema, wraps PRN   Skin: Left hip pressure ulcer w/ hx osteomyelitis, lidocaine gel for dressing changes, WOC consult ordered   Ppx: SCDs, IS, Lovenox  Dispo: Anticipate discharge to nursing home on Saturday.Staples will be removed by her primary physician close  to home  Lines/Drains: Port (not functional), PIV, ileostomy, meléndez     Lory Donahue MD  Ob/Gyn, PGY4    Provider Disclosure:   I agree with above History, Review of Systems, Physical exam and Plan. I have reviewed the content of the documentation and have edited it as needed. I have seen and personally performed the services documented here and the documentation accurately represents those services and the decisions I have made.     Electronically signed by:   Elliott Castillo MD   Gynecologic Oncology   [facility]

## 2020-02-28 NOTE — PLAN OF CARE
A x O. Pain managed w/ scheduled Tylenol and ibuprofen and prn oxycodone. Regular diet, tolerating. Denies n/v. Bernal w/ adequate UOP. Stool and gas per ostomy. Repo Q2H. Resting between cares. Cont. POC.

## 2020-02-28 NOTE — PLAN OF CARE
Abdominal incision dry/intact with staples, Tylenol, Ibuprofen and Oxycodone for incisional pain with relief.Tolerating regular diet,total assist with  meals,Ileostomy with loose stool.Erik cathter tube taken out by Urology this am,last straight cath done at 1230, due at 1630. Right buttock dressing changed this am, turned q2 hours with two assist, declined to get up in wheelchair today, discharge back to nursing home tomorrow, transportation set up for 1100.

## 2020-02-28 NOTE — PROGRESS NOTES
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    -Bladder irrigation performed this AM by Urology team  -Irrigation orders placed: Irrigate qshift using a 60cc catheter-tipped syringe and sterile NS.  Disconnect Meléndez from the drainage tubing. Using the syringe, instill 2 syringes (120 mL) of normal saline, then withdraw one (60 mL), then instill one (60 mL), and withdraw one (60 mL)... and continue this process until there is no longer mucus being irrigated out.  Usually this will require between 120 and 300 mL of total irrigation to clear the mucus.  May irrigate PRN for increased bladder pain/pressure, low urine output or concerns for Meléndez clogging.   - meléndez catheter removed with easy catheterization using a 14 Fr latex catheter, done also easily by nursing staff  - will schedule follow up with Dr Westbrook in 2-3 months to discuss urinary leakage     Will discuss with Dr. Westbrook.    Lesley Bishop MD  Urology PGY5       Contacting the Urology Team     Please use the following job codes to reach the Urology Team. Note that you must use an in house phone and that job codes cannot receive text pages.     On weekdays, dial [phone] (or star-star-star [phone] on the new [employer] telephones) then [phone] to reach the Adult Urology resident or PA on call    On weekdays, dial [phone] (or star-star-star 777 on the new [employer] telephones) then [phone] to reach the Pediatric " Urology resident    On weeknights and weekends, dial [phone] (or star-star-star 777 on the new [employer] telephones) then [phone] to reach the Urology resident on call (for both Adult and Pediatrics)

## 2020-02-28 NOTE — PLAN OF CARE
VSS. Pt chair fast up with ceiling lift. On pulsate mattress. Tolerating regular diet. Stool and gas per ostomy. Adequate UOP via catheter, irrigated per orders. Repo Q2H. Pt able to make needs known. Cont. POC.

## 2020-02-29 VITALS
SYSTOLIC BLOOD PRESSURE: 120 MMHG | OXYGEN SATURATION: 98 % | HEIGHT: 67 IN | WEIGHT: 153.1 LBS | HEART RATE: 78 BPM | TEMPERATURE: 96.5 F | DIASTOLIC BLOOD PRESSURE: 69 MMHG | RESPIRATION RATE: 18 BRPM | BODY MASS INDEX: 24.03 KG/M2

## 2020-02-29 PROCEDURE — 25000132 ZZH RX MED GY IP 250 OP 250 PS 637: Performed by: NURSE PRACTITIONER

## 2020-02-29 PROCEDURE — 25000132 ZZH RX MED GY IP 250 OP 250 PS 637: Performed by: STUDENT IN AN ORGANIZED HEALTH CARE EDUCATION/TRAINING PROGRAM

## 2020-02-29 RX ADMIN — IBUPROFEN 600 MG: 600 TABLET ORAL at 06:25

## 2020-02-29 RX ADMIN — PREGABALIN 100 MG: 75 CAPSULE ORAL at 09:25

## 2020-02-29 RX ADMIN — OXYCODONE HYDROCHLORIDE 10 MG: 5 TABLET ORAL at 10:34

## 2020-02-29 RX ADMIN — ACETAMINOPHEN 650 MG: 325 TABLET, FILM COATED ORAL at 00:27

## 2020-02-29 RX ADMIN — ACETAMINOPHEN 650 MG: 325 TABLET, FILM COATED ORAL at 06:25

## 2020-02-29 RX ADMIN — IBUPROFEN 600 MG: 600 TABLET ORAL at 00:27

## 2020-02-29 ASSESSMENT — ACTIVITIES OF DAILY LIVING (ADL)
ADLS_ACUITY_SCORE: 39

## 2020-02-29 ASSESSMENT — PAIN DESCRIPTION - DESCRIPTORS: DESCRIPTORS: ACHING

## 2020-02-29 NOTE — PLAN OF CARE
.Problem: Patient Care Overview  Goal: Discharge  Outcome: No Change  D AVSS with sat's 98% on room air. Heart regular and lungs  otherwise clear. Has voiced c/o buttock pain  to this nurse and pain treated at 1030 with PRN  Roxicodone prior to discharge for her 3 hour trip. Pt cathed x 1 prior to discontinue, ostomy emptied.  Scheduled to discharge back to [facility]  today at 11:00. I Vital's, assessment and med's per order Incisional staples CDI, no drainage from incision.   A Resting in bed with call light in reach.   P: Ibuprofen picked up from discharge pharmacy and sent with pt. Oxycodone Rx paper sent with pt. Report called to Viri ROSSI at [facility]. discharge paperwork faxed. AVS reviewed with receiving RN and Pt, no additional questions. Pt's WC sent to NH via  at 1130.  Pt discharged from unit 7C at 1115 am today with all personal belongings.

## 2020-02-29 NOTE — PROGRESS NOTES
Gynecologic Oncology Progress Note  2/29/2020    S: Sleeping soundly when I entered the room. Per nurse, pain under control, tolerating PO without issue. Ready to go home. No issues with intermittent catheterization overnight.    O:  Patient Vitals for the past 12 hrs:   BP Temp Temp src Pulse Heart Rate Resp SpO2   02/29/20 0632 120/69 96.5  F (35.8  C) Oral -- 77 18 98 %   02/28/20 2304 94/51 97.9  F (36.6  C) Oral 78 -- 18 94 %     Gen: supine, sleeping soundly  Resp: nonlabored breathing  Abdomen: obese, no reaction to palpation, colostomy with gas and small amt brown stool output  Incision: vertical midline incision, dry with staples reapproximating well  Extremities: BLEs non-tender with SCDs in place, bilateral LE edema    Intake/Output (24 Hrs // Since MN)  PO 480mL // 120    UOP 1315 mL // 462 mL  Stool 650 mL // 150 mL    Labs:   Hgb 15.2>11.6>11.2    A: 44 year old POD#3 s/p XL, right oophorectomy, bilateral salpingectomy.     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 Staples will be removed by her primary physician close to home  Lines/Drains: Port (not functional), PIV, ileostomy, meléndez     Yolette Bauer MD  Obstetrics and Gynecology, PGY-3  February 29, 2020 , 7:11 AM     Provider Disclosure:   I agree with above History, Review of Systems, Physical exam and Plan. I have reviewed the content of the documentation and have edited it as needed. I have personally performed the services documented here and the documentation accurately represents those services and the decisions I have made.     Electronically signed by:   Maribel Toribio MD   Gynecologic Oncology   [facility]

## 2020-02-29 NOTE — PLAN OF CARE
"BP 94/51   Pulse 78   Temp 97.9  F (36.6  C) (Oral)   Resp 18   Ht 1.702 m (5' 7\")   Wt 69.4 kg (153 lb 1.6 oz)   SpO2 94%   BMI 23.98 kg/m      VSS on room air. Total cares, lift dependent. Turn and reposition Q2. Requires total feeding assistance. Ileostomy in place with soft brown output. Requires intermittent straight cath through zahra. Midline incision, stapled, CDI. Wound to right buttock, CDI. Resting between cares. Continue with POC.   "

## 2020-02-29 NOTE — PROGRESS NOTES
.Problem: Patient Care Overview  Goal: Discharge  Outcome: No Change  D AVSS with sat's 98% on room air. Heart regular and lungs  otherwise clear. Has voiced c/o buttock pain  to this nurse and pain treated at 1030 with PRN  Roxicodone prior to discharge for her 3 hour trip. Pt cathed x 1 prior to discontinue, ostomy emptied.  Scheduled to discharge back to [facility]  today at 11:00. I Vital's, assessment and med's per order Incisional staples CDI, no drainage from incision.   A Resting in bed with call light in reach.   P: Ibuprofen picked up from discharge pharmacy and sent with pt. Oxycodone Rx paper sent with pt. Report called to Viri ROSSI at [facility]. discharge paperwork faxed. AVS reviewed with receiving RN and Pt, no additional questions. Pt's WC sent to NH via  at 1130.  Pt discharged from unit 7C at 1115 am today with all personal belongings.

## 2020-02-29 NOTE — PROGRESS NOTES
Time: 6675-8648    Reason for admit: POD#2 ex-lap, right oophorectomy, bilateral salpingectomy     Neuro: A&Ox4, able to make her needs known  Activity: turn q2h with 2 assist, declined to get in wheelchair today   Pain: incisional pain, scheduled tylenol, ibuprofen and prn oxycodone given   Cardiac: WNL, denies chest pain   Respiratory: WNL on RA, denies SOB   GI/: zahra straight cath with mucus and urine output, ileostomy with loose stool and gas   Diet: regular, total assist with meals   Lines: L PIV SL   Skin: abdominal incision dry/intact with staples, right buttock dressing changed by AM nurse     Events: pt resting between cares, has family visiting    Plan: Discharge back to nursing home tomorrow, transportation set up for 1100

## 2020-03-02 ENCOUNTER — HEALTH MAINTENANCE LETTER (OUTPATIENT)
Age: 45
End: 2020-03-02

## 2020-03-04 LAB — COPATH REPORT: NORMAL

## 2020-03-12 ENCOUNTER — HOSPITAL ENCOUNTER (OUTPATIENT)
Dept: WOUND CARE | Facility: CLINIC | Age: 45
End: 2020-03-12
Attending: SURGERY
Payer: MEDICAID

## 2020-03-12 ENCOUNTER — HOSPITAL ENCOUNTER (OUTPATIENT)
Dept: MRI IMAGING | Facility: CLINIC | Age: 45
End: 2020-03-12
Attending: PHYSICIAN ASSISTANT
Payer: MEDICAID

## 2020-03-12 VITALS
TEMPERATURE: 97.2 F | DIASTOLIC BLOOD PRESSURE: 65 MMHG | HEART RATE: 65 BPM | RESPIRATION RATE: 19 BRPM | SYSTOLIC BLOOD PRESSURE: 106 MMHG

## 2020-03-12 DIAGNOSIS — L89.154 PRESSURE INJURY OF SACRAL REGION, STAGE 4 (H): ICD-10-CM

## 2020-03-12 DIAGNOSIS — L89.324 PRESSURE INJURY OF LEFT ISCHIUM, STAGE 4 (H): ICD-10-CM

## 2020-03-12 PROCEDURE — 97602 WOUND(S) CARE NON-SELECTIVE: CPT

## 2020-03-12 PROCEDURE — 25500064 ZZH RX 255 OP 636: Performed by: PHYSICIAN ASSISTANT

## 2020-03-12 PROCEDURE — A6212 FOAM DRG <=16 SQ IN W/BORDER: HCPCS

## 2020-03-12 PROCEDURE — A9585 GADOBUTROL INJECTION: HCPCS | Performed by: PHYSICIAN ASSISTANT

## 2020-03-12 PROCEDURE — 72197 MRI PELVIS W/O & W/DYE: CPT

## 2020-03-12 RX ORDER — GADOBUTROL 604.72 MG/ML
7 INJECTION INTRAVENOUS ONCE
Status: COMPLETED | OUTPATIENT
Start: 2020-03-12 | End: 2020-03-12

## 2020-03-12 RX ORDER — FLUOXETINE 40 MG/1
60 CAPSULE ORAL
COMMUNITY
Start: 2019-11-13 | End: 2020-05-21

## 2020-03-12 RX ORDER — PHENYLEPHRINE HYDROCHLORIDE 10 MG/1
TABLET, FILM COATED ORAL
COMMUNITY
Start: 2020-03-03 | End: 2023-02-14

## 2020-03-12 RX ORDER — NYSTATIN 100000 [USP'U]/G
POWDER TOPICAL
COMMUNITY
Start: 2020-02-05 | End: 2020-05-21

## 2020-03-12 RX ORDER — MAGNESIUM HYDROXIDE 1200 MG/15ML
LIQUID ORAL
COMMUNITY
Start: 2020-03-03

## 2020-03-12 RX ORDER — HYDROCODONE BITARTRATE AND ACETAMINOPHEN 5; 325 MG/1; MG/1
TABLET ORAL
Refills: 0 | COMMUNITY
Start: 2019-09-20 | End: 2020-04-23

## 2020-03-12 RX ORDER — BACLOFEN 20 MG/1
TABLET ORAL PRN
COMMUNITY
Start: 2020-02-25 | End: 2020-04-23

## 2020-03-12 RX ORDER — RESVER/WINE/BFL/GRPSD/PC/C/POM 200MG-60MG
1 CAPSULE ORAL DAILY
COMMUNITY
Start: 2020-02-04 | End: 2020-05-21

## 2020-03-12 RX ORDER — GEL DRESSING
GEL (GRAM) TOPICAL
COMMUNITY
Start: 2020-02-07 | End: 2020-04-23

## 2020-03-12 RX ORDER — MELATONIN 200 MCG
6 TABLET ORAL AT BEDTIME
COMMUNITY
Start: 2020-02-17 | End: 2023-02-14

## 2020-03-12 RX ADMIN — GADOBUTROL 7 ML: 604.72 INJECTION INTRAVENOUS at 10:31

## 2020-03-12 NOTE — DISCHARGE INSTRUCTIONS
[facility]  [address] [address]    Call us at [phone] if you have any questions about your wounds, have redness or swelling around your wound, have a fever of 101 or greater or if you have any other problems or concerns. We answer the phone Monday through Friday 8 am to 4 pm, please leave a message as we check the voicemail frequently throughout the day.     Marsha Mcneill      1975    Júnior Ingrid fax [phone]    Medications/supplements to aid in healin. 1 packet of Amadeo Supplement into your favorite beverage TWICE a day  2. Please start Vitamin D3 5,000 iu per day.    Wound Dressing Change:left ischial tuberosity  After removal of old dressing, use lidocaine to decrease pain.  Cleanse wound and surrounding skin with: saline or wound cleanser.  After cleansing with saline or wound cleanser, apply small amount of VASHE on gauze, lay into wound bed, let sit for 10 minutes, remove gauze (do not rinse) then apply dressing.  Lightly pack wound with Mesalt for 2 weeks.  Then switch to Endoform AM cut to fit wound, moisten with saline as needed  Cover with foam adhesive or ABD with tape.    Change dressing daily       Helen Cedeno M.D.. 2020    Follow up with Provider - 2-3 weeks with Jessica, 4-6 weeks with Dr. Cedeno

## 2020-03-13 NOTE — PROGRESS NOTES
Visit Date:   03/12/2020      [facility] VISIT NOTE      SUBJECTIVE:  This is a 44-year-old female who is here today with her mom.  This patient has had MS for the last 19 years, confined to a wheelchair since 2007 after a flu shot.  Before that, she was actually just walking with a cane.  She is currently a resident at the [facility] in [place].  She has had this left ischial ulcer on and off for the past 2 years, more consistently since 09/2019.  At that time, MRI was positive for osteomyelitis and she underwent debridement for directed antibiotics x 8 weeks.  She has been seen by our physician's assistant, Jessica Moya, who referred her today for additional assessment.  The biggest complaint the patient has is actually significant pain from her wound that often radiates down to her proximal posterior thighs.  Also, the wound has been stagnant for the past 3 months or so.  Fortunately, we got an MRI today and it was negative for osteomyelitis.  She has been using Aquacel AG and some lidocaine solution but really has not made much forward progress and continues to have significant discomfort that only allows her to sit in her wheelchair for about 30 minutes a day.  She does get Lyrica and oxycodone p.r.n. from her primary doctor, but she has not seen any pain specialist.  She tried gabapentin before that, but that was not helpful at all.      OBJECTIVE:  On exam today, there is actually quite a small wound over the left IT.  There is minimal undermining and the depth is variable because there is ingrowth of epithelium along the medial sidewall.  Our measurements today are 1.5 x 0.9 x 1.5 cm, but basically the deepest, depending on where the skin edge is, is approximately 1 cm.  The granulation tissue seems a bit hypertrophic and there is either a film of fibrin versus possible epithelium; it is difficult to tell.  She is quite tender to the exam.         DISCUSSION:  At this point, we will try and clean things up a little bit with Mesalt for a couple weeks and then switch over to Endoform AM.  We will have her start using Vashe and we will discontinue this Aquacel AG.  Since she is still in quite a bit of pain, we will have her first remove the old dressing, then soak with lidocaine, then cleanse, soak with Vashe, and pack with either Mesalt or Endoform.  She seems to understand that and directions for wound cares have been written and will be sent to her caregivers.  At this point, we will have her see Jessica in 2-3 weeks just to make sure we are making forward progress, and then I can see her back in 4-6 weeks.  If things fail to progress, we may need to take her to the OR for a quick debridement to see if we can eradicate the inflammatory tissues.  I explained that I am still not sure exactly why she is having such a wide area of pain with referral.  Again with the small nature of her wound it is possible that there is inflammation or irritation of the sacral plexus and that is causing some of her distal pain.  Were we to try and do some sort of surgery that would try to definitively treat this wound, I am not sure it would help with her pain or even possibly create additional pain for her to deal with.  Hopefully she will have some success with these new regimens and we can make some forward progress.  Please see nursing notes for dimensions of the wound, vital signs and photos.  These were within normal limits today.   Labs:   Recent Labs   Lab Test 02/28/20  0740  02/27/20  0620  03/14/19 03/13/18 12/17/12  0732   ALBUMIN  --   --   --   --   --   --  2.5*   HGB 11.2*   < > 12.0   < >  --   --  10.3*   INR  --   --   --   --   --  1.2* 1.05   WBC  --   --  9.5   < >  --   --  8.7   A1C  --   --   --   --  4.8  --   --     < > = values in this interval not displayed.     Nutrition requirements were discussed with patient today.  Vitals:  /65 (BP  Location: Left arm)   Pulse 65   Temp 97.2  F (36.2  C) (Temporal)   Resp 19   Wound:     Photo:         LOLA FELDMAN MD             D: 2020   T: 2020   MT: MATTHEW      Name:     KIMBERLEY BARCLAY   MRN:      [id number]        Account:      [id number]   :      1975           Visit Date:   2020      Document: [id number]

## 2020-03-19 NOTE — TELEPHONE ENCOUNTER
Spoke with pt   Per pt she is on depo provera to control menstrual cycles  Wonders if this will be continued after surgery  Explained to pt that since the uterus is not being taken out and only one ovary then you will still have monthly menses so if the depo is due to your cycles then you should continue on this    Pt reports understanding

## 2020-03-30 ENCOUNTER — TELEPHONE (OUTPATIENT)
Dept: WOUND CARE | Facility: CLINIC | Age: 45
End: 2020-03-30

## 2020-03-31 ENCOUNTER — TELEPHONE (OUTPATIENT)
Dept: WOUND CARE | Facility: CLINIC | Age: 45
End: 2020-03-31

## 2020-03-31 NOTE — TELEPHONE ENCOUNTER
Called and spoke with Marsha.  She had questions as to how much she is to be sitting on her bottom.  She states she is not sitting up in her chair, but she is sitting up in her bed to work on her laptop and is having pain if she sits too long.  Advised to have as little pressure as possible on her wound with repositioning about every hour when sitting to allow blood flow to return to area; should be turning every 2 hours when lying in bed to decrease pressure to wound.  Understanding expressed, no further questions.

## 2020-04-01 ENCOUNTER — TELEPHONE (OUTPATIENT)
Dept: WOUND CARE | Facility: CLINIC | Age: 45
End: 2020-04-01

## 2020-04-01 NOTE — TELEPHONE ENCOUNTER
Confirmed her visit with Bethany Moya PA-C on 4/22/2020 @ 1:30pm call for rooming and then 2pm call with Bethany Moya and Robyn. She will upload a picture either the day before or that same day. She understands the process.

## 2020-04-14 ENCOUNTER — PRE VISIT (OUTPATIENT)
Dept: NEUROLOGY | Facility: CLINIC | Age: 45
End: 2020-04-14

## 2020-04-23 ENCOUNTER — HOSPITAL ENCOUNTER (OUTPATIENT)
Dept: WOUND CARE | Facility: CLINIC | Age: 45
End: 2020-04-23
Attending: SURGERY
Payer: MEDICAID

## 2020-04-23 DIAGNOSIS — L89.324 PRESSURE ULCER OF ISCHIUM, LEFT, STAGE IV (H): Primary | ICD-10-CM

## 2020-04-23 DIAGNOSIS — G89.4 CHRONIC PAIN DISORDER: ICD-10-CM

## 2020-04-23 DIAGNOSIS — G35 MS (MULTIPLE SCLEROSIS) (H): ICD-10-CM

## 2020-04-23 PROCEDURE — 99442 ZZC PHYSICIAN TELEPHONE EVALUATION 11-20 MIN: CPT | Performed by: PHYSICIAN ASSISTANT

## 2020-04-23 RX ORDER — LORATADINE 10 MG/1
10 TABLET ORAL PRN
COMMUNITY
End: 2023-11-19

## 2020-04-23 NOTE — PROGRESS NOTES
Patient evaluated during telephone visit. Certified Wound Care Nurse time spent evaluating patient record, completed a full evaluation and documented wound(s) & lorenza-wound skin; provided recommendation based on treatment plan. Reviewed discharge instructions, patient education, and discussed plan of care with appropriate medical team staff members and patient and/or family members.

## 2020-04-23 NOTE — DISCHARGE INSTRUCTIONS
[facility]  [address] [address]    Call us at [phone] if you have any questions about your wounds, have redness or  swelling around your wound, have a fever of 101 or greater or if you have any other  problems or concerns. We answer the phone Monday through Friday 8 am to 4 pm,  please leave a message as we check the voicemail frequently throughout the day.    Marsha Osito 1975    Júnior Ingrid fax [phone]    Medications/supplements to aid in healin. 1 packet of Amadeo Supplement into your favorite beverage TWICE a day  2. Vitamin D3 5,000 iu per day.    Wound Dressing Change:left ischial tuberosity  After cleansing with saline or wound cleanser, apply small amount of VASHE on gauze, lay into wound bed, let sit for 10 minutes, remove gauze (do not rinse) then apply dressing. Apply Silvadene cream into wound bed, fill dead space with 2x2 gauze if needed and cover with foam adhesive or ABD with tape. Change dressing daily.    Your provider has referred you to: [facility]: [facility] [place] [phone] [email or web address]. Please call [phone] to make an appointment. Clinic is located: [facility] [address] [place] [address] [id number] [place] [address]     Bethany Moya PA-C, 2020    Wound [facility] follow up with a telephone encounter with Jessica Moya PA-C in 4 weeks on Thursday, May 21st at 2:00pm

## 2020-04-28 ENCOUNTER — PRE VISIT (OUTPATIENT)
Dept: UROLOGY | Facility: CLINIC | Age: 45
End: 2020-04-28

## 2020-04-28 ENCOUNTER — TELEPHONE (OUTPATIENT)
Dept: UROLOGY | Facility: CLINIC | Age: 45
End: 2020-04-28

## 2020-04-28 NOTE — TELEPHONE ENCOUNTER
Contacted patient to discuss below. Patient does not want to stop gent irrigations. Does want to set up an appointment with Dr. Westbrook. Helped patient set up an appointment with Dr. Westbrook for next Monday to discuss continuing the gent irrigations and discuss catheterizing after mass removal.  Coty Velasquez LPN

## 2020-04-28 NOTE — TELEPHONE ENCOUNTER
[facility] Call Center    Phone Message    May a detailed message be left on voicemail: yes     Reason for Call: Order(s): Other:   Reason for requested: To stop taking Gentamicin  Date needed: 4/28/20  Provider name: David     Please fax to [phone] and call pt back.      Action Taken: Message routed to:  [facility] ([facility]): Urology     Travel Screening: Not Applicable

## 2020-04-28 NOTE — TELEPHONE ENCOUNTER
Reason for visit: Symptom check for CIC post mass removal    Relevant information: gentamicin irrigations, CIC    Records/imaging/labs/orders: all records available    Pt called: RIO

## 2020-04-30 NOTE — ADDENDUM NOTE
Encounter addended by: Bethany Moya PA-C on: 4/30/2020 3:00 PM   Actions taken: Clinical Note Signed

## 2020-04-30 NOTE — PROGRESS NOTES
"The patient has been notified of following:     \"This telephone visit will be conducted via a call between you and your physician/provider. We have found that certain health care needs can be provided without the need for a physical exam.  This service lets us provide the care you need with a short phone conversation.  If a prescription is necessary we can send it directly to your pharmacy.  If lab work is needed we can place an order for that and you can then stop by our lab to have the test done at a later time.    If during the course of the call the physician/provider feels a telephone visit is not appropriate, you will not be charged for this service.\"     Patient has given verbal consent for Telephone visit?  Yes    [facility] [place]    HISTORY OF PRESENT ILLNESS:   Marsha Mcneill is a 44 year old female with MS who presents today via telephone for a left IT ulcer. 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  This has been primarily cared for through the [facility] system. Per patient and her nursing staff the wound has been healing in well. History of osteomyelitis seen on MRI 9/12/19. Underwent debridement 9/18/19  By Dr. Vázquez which included bone debridement and evacuation of an abscess. Cultures + for MRSA. Completed IV vanco x 6 wks.     Her main complaint is significant pain that radiates from the wound. She is quite miserable from the pain and notes it with any repositioning of her body or with bandage changes. Follows with PMR for systemic pain medication.      INTERVAL HISTORY:    Visited with Dr. Cedeno 3/12/20, her impression that pain may be related to inflammation or irritation of the sacral plexus, would be open to OR debridement to see if this would help mitigate inflammatory component, however feels that any definitive closure procedure could cause more nerve damage and does not feel this is indcated    MRI 3/12/20 - negative for osteomyelitis or other pathology    Continues to have same " amount of disabling pain radiating from wound    CURRENT/PREVIOUS DRESSING: Endoform AM    COMPRESSION/OFFLOADING: on bedrest on group 2 mattress    REVIEW OF SYSTEMS:  CONSTITUTIONAL: Denies fevers or acute illness, denies recent weight loss   ENDOCRINE: Denies diabetes    PAST MEDICAL HISTORY:  has a past medical history of Anxiety, Chronic pain, Depression, History of DVT (deep vein thrombosis), Ileostomy status (H), Insomnia, MRSA (methicillin resistant staph aureus) culture positive, Multiple scleroses, Neurological disorders, Pressure sore, and Urinary tract infection.    SOCIAL HISTORY: residing in [facility] ([facility])  TOBACCO STATUS:  reports that she has never smoked. She has never used smokeless tobacco.    MEDICATIONS:   Current Outpatient Medications   Medication     acetaminophen (TYLENOL) 500 MG tablet     baclofen (LIORESAL) 10 MG tablet     calcium carbonate (OS-LILIBETH 600 MG [place]. [place]) 600 MG tablet     calcium carbonate (TUMS) 500 MG chewable tablet     cholecalciferol (VITAMIN D3) 5000 units (125 mcg) capsule     D-5000 125 MCG (5000 UT) TABS     diazepam (VALIUM) 2 MG tablet     doxepin (SINEQUAN) 25 MG capsule     FLUoxetine (PROZAC) 20 MG capsule     FLUoxetine (PROZAC) 40 MG capsule     ibuprofen (ADVIL/MOTRIN) 600 MG tablet     lidocaine (XYLOCAINE) 2 % external gel     loratadine (CLARITIN) 10 MG tablet     medroxyPROGESTERone (DEPO-PROVERA) 150 MG/ML injection     Melatonin 3 MG SUBL     NYAMYC 875903 UNIT/GM external powder     nystatin (MYCOSTATIN) 330811 UNIT/GM external cream     oxybutynin ER (DITROPAN XL) 15 MG 24 hr tablet     oxyCODONE (ROXICODONE) 5 MG tablet     pregabalin (LYRICA) 100 MG capsule     traZODone (DESYREL) 100 MG tablet     baclofen (GABLOFEN) 32421 MCG/20ML injection     NEW MED     NONFORMULARY     SM NASAL DECONGESTANT PE 10 MG TABS     sodium chloride 0.9%, bottle, 0.9 % irrigation     No current facility-administered medications for this encounter.          RELEVANT IMAGING: MRI reviewed in chart    ASSESSMENT:   1. Stage 4 pressure ulcer of L IT  2. Pain of left buttocks, consistent with neuropathic pain    PLAN/DISCUSSION:   1. Wound care plan: switch to Silvadene cream applied daily for analgesic effects as well as antibacterial properties  2. Referral made to pain specialist to discuss options for pain control besides orals (nerve block/stimulator??)    FOLLOW-UP: e-visit in 1 month, eventually needs in-clinic follow-up with Dr. Cedeno when pandemic restrictions lessend  DURATION OF CALL: 11-20mins  VINCENT HAMPTON PA-C

## 2020-05-04 ENCOUNTER — VIRTUAL VISIT (OUTPATIENT)
Dept: UROLOGY | Facility: CLINIC | Age: 45
End: 2020-05-04
Payer: MEDICAID

## 2020-05-04 ENCOUNTER — TELEPHONE (OUTPATIENT)
Dept: UROLOGY | Facility: CLINIC | Age: 45
End: 2020-05-04

## 2020-05-04 DIAGNOSIS — N31.9 NEUROGENIC BLADDER: Primary | ICD-10-CM

## 2020-05-04 RX ORDER — OXYBUTYNIN CHLORIDE 15 MG/1
30 TABLET, EXTENDED RELEASE ORAL DAILY
Qty: 60 TABLET | Refills: 3 | Status: SHIPPED | OUTPATIENT
Start: 2020-05-04

## 2020-05-04 NOTE — PROGRESS NOTES
"Marsha Mcneill is a 44 year old female who is being evaluated via a billable telephone visit.      Marsha Mcneill  is a 44 year old female with hx of progressive MS, NGB, and neurogenic bowel. She is s/p ileocystoplasty with Erik catheterizable channel in Dec 2012, and diverting ileostomy at [facility] in 2018. She has had increasing difficulty catheterizing the Erik with incontinence per urethra and Erik, as well as recurrent UTI's. She leaks from the Erik \"quite bit\" and leaking per urethra is \"mostly better\" but she sill has occasional accidents and wears a pad.     A couple of months ago we assisted Dr. Barron from Gyn with excision of dermoid cyst. She had been having  trouble with catheterizing but after excision of the dermoid this got better. The leakage did not get better.     She caths 4-6 times per day. She is taking 15 mg of Ditropan.     I recommended to her that she increase her Ditropan to 30mg a day.  If that doesn't work at reducing her leaking then we can escalate to Botox injection in clinic.    Fax number at care facility for faxing the Rx is: [phone]      The patient has been notified of following:     \"This telephone visit will be conducted via a call between you and your physician/provider. We have found that certain health care needs can be provided without the need for a physical exam.  This service lets us provide the care you need with a short phone conversation.  If a prescription is necessary we can send it directly to your pharmacy.  If lab work is needed we can place an order for that and you can then stop by our lab to have the test done at a later time.    Telephone visits are billed at different rates depending on your insurance coverage. During this emergency period, for some insurers they may be billed the same as an in-person visit.  Please reach out to your insurance provider with any questions.    If during the course of the call the physician/provider feels a telephone visit is " "not appropriate, you will not be charged for this service.\"    Patient has given verbal consent for Telephone visit?  Yes    What phone number would you like to be contacted at?     How would you like to obtain your AVS? MyChart    Phone call duration: 16 minutes    Errol Westbrook MD      "

## 2020-05-04 NOTE — PATIENT INSTRUCTIONS
Please follow up with Dr. Westbrook as needed. If leakage does not subside with medication please call clinic to schedule cystoscopy for Botox injection.     It was a pleasure meeting with you today.  Thank you for allowing me and my team the privilege of caring for you today.  YOU are the reason we are here, and I truly hope we provided you with the excellent service you deserve.  Please let us know if there is anything else we can do for you so that we can be sure you are leaving completely satisfied with your care experience.      Alex Coley, EMT

## 2020-05-04 NOTE — TELEPHONE ENCOUNTER
[facility] Call Center    Phone Message    May a detailed message be left on voicemail: yes     Reason for Call: Other: Pt returning call from clinic to check in for appt.     Action Taken: Message routed to:  [facility] Urology    Travel Screening: Not Applicable

## 2020-05-08 ENCOUNTER — TELEPHONE (OUTPATIENT)
Dept: UROLOGY | Facility: CLINIC | Age: 45
End: 2020-05-08

## 2020-05-08 NOTE — TELEPHONE ENCOUNTER
Message left on Emili's voicemail stating that Per Dr. Errol Westbrook's last clinic note. He recommended  If leakage does not subside with medication please call clinic to schedule cystoscopy for Botox injection.     Betty Dutton MA

## 2020-05-08 NOTE — TELEPHONE ENCOUNTER
Health Call Center    Phone Message    May a detailed message be left on voicemail: yes     Reason for Call: Other: Emili calling from [facility] on behalf of Marsha. Marsha has had an increase of bladder spasms with and without cathing. Emili said Marsha would like to know if they can increase her oxybutynin. Emili said that the team can be called back at [phone], and ask to speak to the [employer] station. Please give them a call back at your earliest convenience.     Action Taken: Message routed to:  [facility]    Travel Screening: Not Applicable

## 2020-05-12 ENCOUNTER — VIRTUAL VISIT (OUTPATIENT)
Dept: ANESTHESIOLOGY | Facility: CLINIC | Age: 45
End: 2020-05-12
Attending: PHYSICIAN ASSISTANT
Payer: MEDICAID

## 2020-05-12 DIAGNOSIS — M54.10 RADICULITIS: Primary | ICD-10-CM

## 2020-05-12 ASSESSMENT — ENCOUNTER SYMPTOMS
FOCAL WEAKNESS: 0
DEPRESSION: 1
SENSORY CHANGE: 0

## 2020-05-12 ASSESSMENT — PAIN SCALES - GENERAL: PAINLEVEL: SEVERE PAIN (6)

## 2020-05-12 NOTE — PROGRESS NOTES
"Marsha Mcneill is a 44 year old female who is being evaluated via a billable video visit.      The patient has been notified of following:     \"This video visit will be conducted via a call between you and your physician/provider. We have found that certain health care needs can be provided without the need for an in-person physical exam.  This service lets us provide the care you need with a video conversation.  If a prescription is necessary we can send it directly to your pharmacy.  If lab work is needed we can place an order for that and you can then stop by our lab to have the test done at a later time.    Video visits are billed at different rates depending on your insurance coverage.  Please reach out to your insurance provider with any questions.    If during the course of the call the physician/provider feels a video visit is not appropriate, you will not be charged for this service.\"    Patient has given verbal consent for Video visit? Yes    How would you like to obtain your AVS? Khushi    Patient would like the video invitation sent by: Send to e-mail at: [email or web address]    Will anyone else be joining your video visit? No        Sunita Luna CMA      "

## 2020-05-12 NOTE — PATIENT INSTRUCTIONS
Medications:    Recommendations will be written in the providers note for your Primary Care provider (OR other providers in your care team) to review and make changes to your therapies based on their discretion.    Imaging:    IMAGING SERVICES HOURS:    All imaging modalities are available from 7 a.m. - 9 p.m. Monday through Friday  X-ray, CT, MRI, and General Ultrasound appointments are available from 7 a.m. -3:30 p.m. on Saturdays  X-ray, CT and MRI appointments are available from 8 a.m. - 4:30 p.m. on Sundays  Please call [phone] to schedule imaging exams      Recommended Follow up:  As needed.         To speak with a nurse, schedule/reschedule/cancel a clinic appointment, or request a medication refill call: [phone]    You can also reach us by [employer]: [email or web address]

## 2020-05-12 NOTE — PROGRESS NOTES
"  Pain Clinic New Patient Consult Note:    Referring Provider: Nita   Primary care provider: Cruzito Pardo    Marsha Mcneill is a 44 year old y.o. old female who presents to the pain clinic with \"L ischial tuberosity nerve pain\"    She has a history of MS with associated quadriplegia and spasticity  She has a wound at her left ischium and associated pain.    HPI:  Patient Supplied Answers To the  Pain Questionnaire    She has had the pain for 2 years,. 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 She is now s/p debridement (9/18/2019) and IV antibiotics.    The pain starts in the location of her wound and radiates throught buttock, lateral hip, and posterior thigh.  It is a constant dull ache and burning sensation that progressively worsens with sitting.  Prior to this episode of osteomyelitis she was able to lie flat without exacerbation of her pain.  However now she cannot sit for more than 1 hr due to pain and her pain it is present when lying down, even when lying on her right side.    She has no associated numbness or tingling.  She has no new fevers, chills, constitutional symptoms.  The wound was healing status post debridement but now has plateaued and per patient report is approximately 1.5 cm deep.    Current pain treatments:  Medications:   -She has a baclofen pump for spasticity as well as 10 mg p.o. baclofen available PRN for spasticity exacerbations  - Valium 2 mg for spasticity, uses approximately 1-2 times per month  -Ibuprofen 400 mg, uses approximately once per week-not helpful  -Acetaminophen 1000 mg 3 times daily  -Oxycodone uses on average 1 5 mg tablet per day- somewhat helpful for pain, makes her less concerned about the pain for approximately 1 to 1-1/2 hours    Past pain treatments:  Marsha Mcneill has not been seen at a pain clinic in the past.    Herbal medicines: No  Physical therapy: No  Chiropractor: No  Pain physician: No  Surgery: As above, status post " debridement  Biofeedback: No  Acupuncture: No  Interventions: No    Past Pain Medications:  Anti-convulsants: Lyrica 100 mg TID - not helpful for pain (previously on gabapentin for neuropathy)   Muscle relaxors: valium 2mg 1-2 times per month  Anti-depressants: Not for pain, currently on Prozac for mood  Topicals:lidocaine topically - formerly used for her sore, only helpful for ~10 min; topical morphine used once but she [place] get it [place] by [place] insurance  Headache abortives: No  Headache prophylactics: None  Opioids: As above    Significant Medical History:   Past Medical History:   Diagnosis Date     Anxiety      Chronic pain      Depression      History of DVT (deep vein thrombosis)      Ileostomy status (H)      Insomnia      MRSA (methicillin resistant staph aureus) culture positive      Multiple scleroses      Neurological disorders      Pressure sore      Urinary tract infection         Past Surgical History:  Past Surgical History:   Procedure Laterality Date     BLADDER AUGMENTATION  12/7/2012    Procedure: BLADDER AUGMENTATION;  APPENDECTOMY, BLADDER AUGMENTATION, HUMBERTO BLADDER STOMA;  Surgeon: Errol Westbrook MD;  Location: [facility] OR     ILEOSTOMY  2018     INSERT PUMP BACLOFEN  2009    2 revisions     IRRIGATION AND DEBRIDEMENT DECUBITUS WOUND, COMBINED  09/2019     LAPAROTOMY EXPLORATORY  12/20/2012    Procedure: LAPAROTOMY EXPLORATORY;  Removal of Inter Abdominal Drain;  Surgeon: Errol Westbrook MD;  Location: [facility] OR     LAPAROTOMY EXPLORATORY      ileostomy fro diversion of obstipation     MITROFANOFF PROCEDURE (APPENDIX CONDUIT)  12/7/2012    Procedure: MITROFANOFF PROCEDURE (APPENDIX CONDUIT);;  Surgeon: Errol Westbrook MD;  Location: [facility] OR     SALPINGECTOMY Bilateral 2/26/2020    Procedure: Laparotomy, removal of pelvic mass, both fallopian tubes,;  Surgeon: Tommie Barron MD;  Location: [facility] OR        Family History:  Family History   Problem Relation Age of Onset      Cancer Mother         non hodgkin's lymphoma      Diabetes Mother      Diabetes Maternal Grandfather      Cardiovascular Maternal Grandfather      Cardiovascular Father         Social History:  Social History     Socioeconomic History     Marital status:      Spouse name: Not on file     Number of children: Not on file     Years of education: Not on file     Highest education level: Not on file   Occupational History     Not on file   Social Needs     Financial resource strain: Not on file     Food insecurity     Worry: Not on file     Inability: Not on file     Transportation needs     Medical: Not on file     Non-medical: Not on file   Tobacco Use     Smoking status: Never Smoker     Smokeless tobacco: Never Used   Substance and Sexual Activity     Alcohol use: No     Drug use: No     Sexual activity: Not on file   Lifestyle     Physical activity     Days per week: Not on file     Minutes per session: Not on file     Stress: Not on file   Relationships     Social connections     Talks on phone: Not on file     Gets together: Not on file     Attends [employer] service: Not on file     Active member of club or organization: Not on file     Attends meetings of clubs or organizations: Not on file     Relationship status: Not on file     Intimate partner violence     Fear of current or ex partner: Not on file     Emotionally abused: Not on file     Physically abused: Not on file     Forced sexual activity: Not on file   Other Topics Concern     Parent/sibling w/ CABG, MI or angioplasty before 65F 55M? 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 Martinez       baclofen (LIORESAL) 10 MG tablet Take 10 mg by mouth as needed        calcium carbonate (OS-LILIBETH 600 MG [place]. [place]) 600 MG tablet Take 600 mg by mouth At Bedtime        calcium carbonate (TUMS) 500 MG chewable tablet Take 1-2 chew tab by mouth daily as needed for heartburn       cholecalciferol (VITAMIN D3) 5000 units (125 mcg) capsule Take 1 capsule (5,000 Units) by mouth daily 30 capsule 11     D-5000 125 MCG (5000 UT) TABS Take 1 tablet by mouth daily       diazepam (VALIUM) 2 MG tablet Take 2 mg by mouth 2 times daily as needed for muscle spasms        doxepin (SINEQUAN) 25 MG capsule TAKE 1 CAPSULE BY MOUTH DAILY AT BEDTIME  99     FLUoxetine (PROZAC) 20 MG capsule Take 60 mg by mouth daily        FLUoxetine (PROZAC) 40 MG capsule Take 60 mg by mouth       ibuprofen (ADVIL/MOTRIN) 600 MG tablet Take 1 tablet (600 mg) by mouth every 6 hours as needed for moderate pain 12 tablet 0     lidocaine (XYLOCAINE) 2 % external gel Apply topically daily 30 mL 11     loratadine (CLARITIN) 10 MG tablet Take 10 mg by mouth daily       medroxyPROGESTERone (DEPO-PROVERA) 150 MG/ML injection Inject 150 mg into the muscle every 3 months.       Melatonin 3 MG SUBL 6 mg by Oral or Feeding Tube route At Bedtime       NEW  mg gentamicin in 1 Liter 0.9 Normal Saline.  Instill 30 ml of gentamicin solution into bladder at HS. 900 mL 11     NONFORMULARY INSTILL 30ML OF GENTAMICIN SOLUTION INTO BLADDER AT BEDTIME       NYAMYC 571644 UNIT/GM external powder APPL A SMALL AMOUNT TOPICALLY TO NECK  FOLDS ONCE A DAY AS NEEDED       nystatin (MYCOSTATIN) 899501 UNIT/GM external cream 2 times daily as needed        oxybutynin ER (DITROPAN XL) 15 MG 24 hr tablet Take 2 tablets (30 mg) by mouth daily 60 tablet 3     oxyCODONE (ROXICODONE) 5 MG tablet Take 1-2 tablets (5-10 mg) by mouth every 6 hours as needed for moderate to severe pain 10 tablet 0     pregabalin (LYRICA) 100 MG capsule TAKE 1 CAP BY MOUTH THREE TIMES DAILY       SM NASAL DECONGESTANT PE 10 MG TABS TAKE 1 TABLET BY MOUTH ONCE DAILY AS NEEDED       sodium chloride 0.9%, bottle, 0.9 % irrigation        traZODone (DESYREL) 100 MG tablet TAKE 1 TAB BY MOUTH AT BEDTIME  99       [place] prescription Monitoring Program reviewed    Blood thinner: No    Work History: Previously an industrial automation writer, now applying for disability    Psychosocial History:   History of treatment for behavioral disorder: Has a history of depression, currently on Prozac  History of treatment for chemical dependency: No    Review of Systems:  Review of Systems   Constitutional: Positive for malaise/fatigue.   Neurological: Negative for sensory change and focal weakness.   Psychiatric/Behavioral: Positive for depression.       Physical Exam:   General Appearance: No distress  Mood: Euthymic  Skin: No rashes over exposed skin of face  Neuro: EOM intact, AAO x3.       Laboratory results:  Recent Labs   Lab Test 02/27/20  0620 02/26/20  0703 02/03/20  1204     --  138   POTASSIUM 3.8 3.9 4.3   CHLORIDE 110*  --  109   CO2 23  --  22   ANIONGAP 5  --  7   *  --  74   BUN 11  --  14   CR 0.36*  --  0.46*   LILIBETH 8.8  --  9.5       CBC RESULTS:   Recent Labs   Lab Test 02/28/20  0740  02/27/20  0620   WBC  --   --  9.5   RBC  --   --  3.97   HGB 11.2*   < > 12.0   HCT  --   --  37.7   MCV  --   --  95   MCH  --   --  30.2   MCHC  --   --  31.8   RDW  --   --  12.0   PLT  --   --  211    < > = values in this interval not displayed.       Imaging:  MR PELVIC BONES WITHOUT  AND WITH CONTRAST 3/12/2020 11:32 AM      HISTORY: Quadriplegia with stage 4 pressure ulcer left ischial  tuberosity with history of osteo. Compare with MRI from September 2019. Pressure injury of left ischium, stage 4 (H).     CONTRAST DOSE: 7 mL Gadavist     TECHNIQUE: Multiple images in multiple planes without and with  contrast.     COMPARISON: 9/12/2019 MR     FINDINGS: Left-sided decubitus wound is noted penetrating to the  ischial tuberosity with mild rim enhancement, also present on  9/12/2019. Only minimal adjacent soft tissue edema is noted, improved.  No adjacent fluid or rim-enhancing collection is demonstrated within  the soft tissues. Signal within the ischial tuberosity is within  normal limits without evidence of osteomyelitis. On the right, there  is linear low signal intensity within the subcutaneous fat which could  be related to an impending or healed decubitus wound, unchanged.  Marrow signal within the right ischium also appears within normal  limits. Marrow signal within the remainder of the osseous pelvis and  proximal femurs is within normal limits. No hip joint effusion.  Bilateral rim-enhancing greater trochanteric bursitis is noted. The  urinary bladder is distended with deformity along the cephalad right  margin which may be related to bladder augmentation. Bubbles of gas  are noted anteriorly within the urinary bladder as well. No free  pelvic fluid is demonstrated.                                                                      IMPRESSION:  1. Left decubitus wound penetrating to the ischial tuberosity without  evidence of adjacent osteomyelitis or soft tissue abscess.  2. Bilateral mild to moderate greater trochanteric bursitis with rim  enhancement, new since 9/12/2019.    ASSESSMENT AND PLAN:     Encounter Diagnosis:    Marsha Mcneill is a 44 year old y.o. old female who presents to the pain clinic with pain related to a left ischio tuberosity pressure ulcer.  She has previously  tried topical lidocaine, which was only briefly beneficial (minutes).  She had 1 application of topical morphine but was unable to continue the medication due to lack of insurance coverage. She is getting very short-lived relief from her 5-10 mg oxycodone, so it is reasonable to transition her to a long-acting opioid.  We would recommend MS Contin 15 mg once daily with the option to try trait up to 15 mg twice daily as needed.  Additionally, she has room to go up on her Lyrica and we would recommend an increase 150 mg 3 times daily.  There was a question on consult as to nerve pain and the utility of a nerve block in this setting, so we will get an MRI of her lumbar spine to evaluate.     Recommendations/Plan:   1. Patient education: I went over the above diagnoses and treatment plan with her and answered all of her questions.  2. Imaging Orders: MRI lumbar spine as above given question on consult for nerve pain and possibility of block  3. Referrals: none at this time  4. Recommendations to primary care provider:   -Change opioid medication from oxycodone 5 mg to MS Contin 15 mg once daily for more sustained pain relief.  Titrate up to 15 mg twice daily as needed and tolerated.  -Increase Lyrica to 150 mg 3 times daily   5. Interventions: None at this time.  Will get lumbar MRI as above  6. Follow up: 2 to 3 months    Thank you for the consult.   Joyce Ambriz MD  Pain Medicine Fellow  [facility]    Patient seen and examined with Dr. Higuera who agrees with the assessment and plan    I saw and examined the patient with the fellow and agree with the findings and the plan of care as documented in the fellow's note.   Juan Jose Higuera IV, MD

## 2020-05-18 ENCOUNTER — TELEPHONE (OUTPATIENT)
Dept: ANESTHESIOLOGY | Facility: CLINIC | Age: 45
End: 2020-05-18

## 2020-05-18 NOTE — TELEPHONE ENCOUNTER
[facility] Call Center    Phone Message    May a detailed message be left on voicemail: yes     Reason for Call: Other: Pt would like a call back as she needs the perscription order to be sent to her nursing home directly as they can't find it in the data base for care every where. pLease reach out to pt to discuss     Action Taken: Message routed to:  [facility]    Travel Screening: Not Applicable

## 2020-05-18 NOTE — TELEPHONE ENCOUNTER
[facility] Call Center    Phone Message    May a detailed message be left on voicemail: yes     Reason for Call: [facility] called to see if there are any new skilled nursing orders after 5/12/2020 visit with Dr. Higuera. Please call [phone] and leave a detailed message, or feel free to fax them to [phone].     Action Taken: Message routed to:  [facility] Pain Clinic    Travel Screening: Not Applicable

## 2020-05-19 ENCOUNTER — TELEPHONE (OUTPATIENT)
Dept: UROLOGY | Facility: CLINIC | Age: 45
End: 2020-05-19

## 2020-05-19 NOTE — TELEPHONE ENCOUNTER
LVM for patient to call clinic back to discuss what questions she had regarding dosages.  Coty Velasquez LPN

## 2020-05-19 NOTE — TELEPHONE ENCOUNTER
[facility] Call Center    Phone Message    May a detailed message be left on voicemail: yes     Reason for Call: Other: Pt called back regarding med dosage, pt stated she will be free 12:30 to 1 dior.      Action Taken: Message routed to:  [facility] Urology     Travel Screening: Not Applicable

## 2020-05-19 NOTE — TELEPHONE ENCOUNTER
[facility] Call Center    Phone Message    May a detailed message be left on voicemail: yes     Reason for Call: Other: Pt calling in and checking the status of this request. Please follow up with pt when available. Thank you      Action Taken: Message routed to:  [facility] Pain    Travel Screening: Not Applicable

## 2020-05-19 NOTE — TELEPHONE ENCOUNTER
[facility] Call Center    Phone Message    May a detailed message be left on voicemail: yes     Reason for Call: Medication Question or concern regarding medication   Prescription Clarification  Name of Medication: oxybutynin ER (DITROPAN XL) 15 MG 24 hr tablet   Prescribing Provider: Dr. Westbrook   Question? Marsha would like to confirm what her dosage amount is for her oxybutynin ER (DITROPAN XL) 15 MG 24 hr tablet. Marsha said that Dr. Westbrook had recently increased the amount, so she wanted to confirm with the care team what that was. Please give Marsha a call back at your earliest convenience.          Action Taken: Message routed to:  [facility]  Uro    Travel Screening: Not Applicable

## 2020-05-19 NOTE — TELEPHONE ENCOUNTER
Message left on the patient's voicemail stating that on 5/4/2020 Dr. Errol Westbrook increased her Oxybutynin ER (DITROPAN XL) 15 mg to take 2 tablets (30 mg) daily to help reduce her leakage.      Betty Dutton MA

## 2020-05-19 NOTE — TELEPHONE ENCOUNTER
[facility] Call Center    Phone Message    May a detailed message be left on voicemail: yes     Reason for Call: Medication Question or concern regarding medication   Prescription Clarification  Name of Medication: oxybutynin ER (DITROPAN XL) 15 MG 24 hr tablet   Prescribing Provider: Pietro    Pharmacy: [facility]    What on the order needs clarification? Pt has questions about med doage. Please call back to discuss.           Action Taken: Message routed to:  [facility] Urology     Travel Screening: Not Applicable

## 2020-05-19 NOTE — TELEPHONE ENCOUNTER
Message left on the patient's voicemail stating to give us a call to discuss her oxybutynin dosage.    Betty Dutton MA

## 2020-05-20 NOTE — TELEPHONE ENCOUNTER
[facility] Call Center    Phone Message    May a detailed message be left on voicemail: yes     Reason for Call: Other: Patient called in to provide clarification.  She needs the recommendations of med changed faxed to [facility] at [phone] so they can fax to her PCP to write the new RX.      Action Taken: Message routed to:  [facility]    Travel Screening: Not Applicable

## 2020-05-20 NOTE — TELEPHONE ENCOUNTER
JUAN DAVID left for Emili informing them that the provider has made recommendations in their clinic note. Once completed, the note will be faxed to their office.     Emili was encouraged to call the clinic back if they have additional questions.       Amy Purcell LPN

## 2020-05-21 ENCOUNTER — HOSPITAL ENCOUNTER (OUTPATIENT)
Dept: WOUND CARE | Facility: CLINIC | Age: 45
End: 2020-05-21
Attending: PHYSICIAN ASSISTANT
Payer: MEDICAID

## 2020-05-21 DIAGNOSIS — L89.324 PRESSURE INJURY OF LEFT ISCHIUM, STAGE 4 (H): ICD-10-CM

## 2020-05-21 PROCEDURE — 99441 ZZC PHYSICIAN TELEPHONE EVALUATION 5-10 MIN GOVT: CPT | Performed by: PHYSICIAN ASSISTANT

## 2020-05-21 RX ORDER — GENTAMICIN 40 MG/ML
INJECTION, SOLUTION INTRAMUSCULAR; INTRAVENOUS
COMMUNITY
Start: 2020-05-13 | End: 2023-02-14

## 2020-05-21 RX ORDER — SILVER SULFADIAZINE 10 MG/G
CREAM TOPICAL
COMMUNITY
Start: 2020-04-23 | End: 2023-02-14

## 2020-05-21 NOTE — TELEPHONE ENCOUNTER
Clinic noted is signed and completed by Dr. Higuera. [employer] faxed over the information as requested.     Pt was notified by [employer].   Amy Purcell LPN

## 2020-05-21 NOTE — PROGRESS NOTES
"[facility] TELEPHONE VISIT    Marsha Mcneill is a 44 year old female who is being evaluated via a billable telephone visit.      The patient has been notified of following:     \"This telephone visit will be conducted via a call between you and your physician/provider. We have found that certain health care needs can be provided without the need for a physical exam.  This service lets us provide the care you need with a short phone conversation.  If a prescription is necessary we can send it directly to your pharmacy.  If lab work is needed we can place an order for that and you can then stop by our lab to have the test done at a later time.    If during the course of the call the physician/provider feels a telephone visit is not appropriate, you will not be charged for this service.\"     Physician has received verbal consent for a Telephone Visit from the patient? Yes    CHIEF COMPLAINT:  Left IT pressure ulcer    HISTORY OF PRESENT ILLNESS:  Marsha Mcneill is a 44 year old female with MS who presents today via telephone for a left IT ulcer. Essentially quadriplegic 2  to MS.  This has been primarily cared for through the [facility] system. History of osteomyelitis seen on MRI 9/12/19. Underwent debridement 9/18/19  By Dr. Vázquez which included bone debridement and evacuation of an abscess. Cultures + for MRSA. Completed IV vanco x 6 wks.      Ms. Mcneill currently resides at [facility].  Pain remains the primary concern. She had a pain management appt at the [facility] last Tuesday and recommendations were provided to her PCP.    She has been limiting time in her chair to an hour q3-4 days.  Consuming high protein diet    Wound opening has been stable - 0.75cm d and there has been no drainage    PAST MEDICAL HISTORY:   has a past medical history of Anxiety, Chronic pain, Depression, History of DVT (deep vein thrombosis), Ileostomy status (H), Insomnia, MRSA (methicillin resistant staph aureus) " culture positive, Multiple scleroses, Neurological disorders, Pressure sore, and Urinary tract infection.  PAST SURGICAL HISTORY:  Past Surgical History:   Procedure Laterality Date     BLADDER AUGMENTATION  12/7/2012    Procedure: BLADDER AUGMENTATION;  APPENDECTOMY, BLADDER AUGMENTATION, HUMBERTO BLADDER STOMA;  Surgeon: Errol Westbrook MD;  Location: [facility] OR     ILEOSTOMY  2018     INSERT PUMP BACLOFEN  2009    2 revisions     IRRIGATION AND DEBRIDEMENT DECUBITUS WOUND, COMBINED  09/2019     LAPAROTOMY EXPLORATORY  12/20/2012    Procedure: LAPAROTOMY EXPLORATORY;  Removal of Inter Abdominal Drain;  Surgeon: Errol Westbrook MD;  Location: [facility] OR     LAPAROTOMY EXPLORATORY      ileostomy fro diversion of obstipation     MITROFANOFF PROCEDURE (APPENDIX CONDUIT)  12/7/2012    Procedure: MITROFANOFF PROCEDURE (APPENDIX CONDUIT);;  Surgeon: Errol Westbrook MD;  Location: [facility] OR     SALPINGECTOMY Bilateral 2/26/2020    Procedure: Laparotomy, removal of pelvic mass, both fallopian tubes,;  Surgeon: Tommie Barron MD;  Location: [facility] OR     SOCIAL HISTORY:  Social History     Tobacco Use     Smoking status: Never Smoker     Smokeless tobacco: Never Used   Substance Use Topics     Alcohol use: No     ALLERGIES:  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     Macrobid [Nitrofurantoin Anhydrous] Nausea and Vomiting     MEDICATIONS:  acetaminophen (TYLENOL) 500 MG tablet, Take 1,000 mg by mouth 3 times daily   baclofen (GABLOFEN) 64865 MCG/20ML injection, As of February 3, 2020   Drug: Baclofen 2000 mcg/mL   Rate (simple continuous rate): 459.5 mcg/day   Low reservoir alarm date: 4/16/20  Managed by Dr. Martinez  baclofen (LIORESAL) 10 MG tablet, Take 10 mg by mouth as needed    calcium carbonate (OS-LILIBETH 600 MG [place]. [place]) 600 MG tablet, Take 600 mg by mouth At Bedtime   calcium carbonate (TUMS) 500 MG chewable tablet, Take 1-2 chew tab by mouth daily as needed for heartburn  cholecalciferol (VITAMIN D3) 5000 units (125 mcg) capsule, Take 1 capsule (5,000 Units) by mouth daily  diazepam (VALIUM) 2 MG tablet, Take 2 mg by mouth 2 times daily as needed for muscle spasms   doxepin (SINEQUAN) 25 MG capsule, TAKE 1 CAPSULE BY MOUTH DAILY AT BEDTIME  FLUoxetine (PROZAC) 20 MG capsule, Take 60 mg by mouth daily   gentamicin (GARAMYCIN) 40 MG/ML injection,   ibuprofen (ADVIL/MOTRIN) 600 MG tablet, Take 1 tablet (600 mg) by mouth every 6 hours as needed for moderate pain  loratadine (CLARITIN) 10 MG tablet, Take 10 mg by mouth daily  medroxyPROGESTERone (DEPO-PROVERA) 150 MG/ML injection, Inject 150 mg into the muscle every 3 months.  Melatonin 3 MG SUBL, 6 mg by Oral or Feeding Tube route At Bedtime  NEW MED, 480 mg gentamicin in 1 Liter 0.9 Normal Saline.  Instill 30 ml of gentamicin solution into bladder at HS.  NONFORMULARY, INSTILL 30ML OF GENTAMICIN SOLUTION INTO BLADDER AT BEDTIME  nystatin (MYCOSTATIN) 342030 UNIT/GM external cream, 2 times daily as needed   oxybutynin ER (DITROPAN XL) 15 MG 24 hr tablet, Take 2 tablets (30 mg) by mouth daily  oxyCODONE (ROXICODONE) 5 MG tablet, Take 1-2 tablets (5-10 mg) by mouth every 6 hours as needed for moderate to severe pain  pregabalin (LYRICA) 100 MG capsule, TAKE 1 CAP BY MOUTH THREE TIMES DAILY  silver sulfADIAZINE (SILVADENE) 1 % external cream, APPLY TOPICALLY TO WOUND ONCE DAILY AFTER CLEANING AREA THEN BANDGAE AND WRAP WOUND  SM NASAL DECONGESTANT PE 10 MG TABS, TAKE 1 TABLET BY MOUTH ONCE DAILY AS NEEDED  sodium chloride 0.9%, bottle, 0.9 % irrigation,   traZODone (DESYREL) 100 MG tablet, TAKE 1 TAB BY MOUTH AT BEDTIME    No current facility-administered medications on file prior to encounter.       REVIEW OF SYSTEMS:  The Review of Systems  is negative other than noted in the HPI.    ASSESSMENT:    1. Stage 4 pressure ulcer of left IT    PLAN/DISCUSSION:    1. Wound care plan  a. Continue current daily regimen of silvadene cream  2. Pain management through specialty clinic and PCP    FOLLOW UP:    1. Office visit with Dr. Cedeno on June 11, 2020.    Phone call duration: 9 minutes  Time spent with speaking with the patient and/or caregiver, reviewing the EMR, reviewing laboratory and images/imaging studies, counseling and coordinating care:  15 minutes.  Call participants: Xavi Lua PA-C, Amy Carlos - Wound RN, Patient's Nurse - Ramy and patient       Xavi Lua PA-C  Office: [phone]

## 2020-06-03 ENCOUNTER — TRANSFERRED RECORDS (OUTPATIENT)
Dept: HEALTH INFORMATION MANAGEMENT | Facility: CLINIC | Age: 45
End: 2020-06-03
Payer: MEDICAID

## 2020-06-05 ENCOUNTER — TRANSFERRED RECORDS (OUTPATIENT)
Dept: HEALTH INFORMATION MANAGEMENT | Facility: CLINIC | Age: 45
End: 2020-06-05

## 2020-06-05 ENCOUNTER — TELEPHONE (OUTPATIENT)
Dept: ANESTHESIOLOGY | Facility: CLINIC | Age: 45
End: 2020-06-05

## 2020-06-05 NOTE — TELEPHONE ENCOUNTER
[facility] Call Center    Phone Message    May a detailed message be left on voicemail: yes     Reason for Call: Other: Pt has been on morphine for two weeks, still having a lot of soreness, wants to know other options, also can have MRI  done there     Action Taken: Message routed to:  [facility]    Travel Screening: Not Applicable

## 2020-06-09 NOTE — TELEPHONE ENCOUNTER
[facility] Call Center    Phone Message    May a detailed message be left on voicemail: yes     Reason for Call: Other: Patient is calling in she called on 06/05 and has not heard back from anyone yet. She is still in pain and still would like to know if she can do her MRI closer to home. Please follow up with the patient asap. Thank you.     Action Taken: Message routed to:  [facility] pain    Travel Screening: Not Applicable

## 2020-06-10 ENCOUNTER — MEDICAL CORRESPONDENCE (OUTPATIENT)
Dept: HEALTH INFORMATION MANAGEMENT | Facility: CLINIC | Age: 45
End: 2020-06-10

## 2020-06-10 ENCOUNTER — TRANSFERRED RECORDS (OUTPATIENT)
Dept: HEALTH INFORMATION MANAGEMENT | Facility: CLINIC | Age: 45
End: 2020-06-10

## 2020-06-10 NOTE — TELEPHONE ENCOUNTER
I called and LVM for the pt informing her that we received her messages. We will talk to Dr. Higuera about it then call her back.    If you have any other questions or concerns you can call us back at [phone].    Sunita Luna, [facility]

## 2020-06-12 ENCOUNTER — TRANSCRIBE ORDERS (OUTPATIENT)
Dept: OTHER | Age: 45
End: 2020-06-12

## 2020-06-12 DIAGNOSIS — G35 MULTIPLE SCLEROSIS, PRIMARY CHRONIC PROGRESSIVE (H): ICD-10-CM

## 2020-06-12 DIAGNOSIS — Z93.2 ILEOSTOMY IN PLACE (H): ICD-10-CM

## 2020-06-12 DIAGNOSIS — S31.109A WOUND OF ABDOMEN: Primary | ICD-10-CM

## 2020-06-12 NOTE — TELEPHONE ENCOUNTER
I called and LVM for the pt informing her that Dr. Higuera would like to discuss the medication changes with the pt so we scheduled her for a 7am virtual visit with Dr. Higuera on 06/1720.    I also informed the pt that there is an MRI order placed but she can talk to Dr. Higuera about that too on 06/17/20 at 7am.    If you have any further questions or concerns you can call us back at [phone].    Sunita Luna, CMA

## 2020-06-12 NOTE — TELEPHONE ENCOUNTER
[facility] Call Center    Phone Message    May a detailed message be left on voicemail: yes     Reason for Call: Other:      Marsha is calling in again asking to speak with someone in the Pain clinic.   She has been waiting for an answer for a few days now. Please call her back ASAP to discuss.           Action Taken: Message routed to:  [facility]    Travel Screening: Not Applicable

## 2020-06-16 ENCOUNTER — HOSPITAL ENCOUNTER (OUTPATIENT)
Dept: WOUND CARE | Facility: CLINIC | Age: 45
End: 2020-06-16
Attending: OPTOMETRIST
Payer: MEDICAID

## 2020-06-16 DIAGNOSIS — L89.324 PRESSURE INJURY OF LEFT ISCHIUM, STAGE 4 (H): ICD-10-CM

## 2020-06-16 PROCEDURE — 99441 ZZC PHYSICIAN TELEPHONE EVALUATION 5-10 MIN: CPT | Performed by: NURSE PRACTITIONER

## 2020-06-16 RX ORDER — MORPHINE SULFATE 15 MG/1
TABLET, FILM COATED, EXTENDED RELEASE ORAL
COMMUNITY
Start: 2020-05-28 | End: 2023-02-14

## 2020-06-16 NOTE — PROGRESS NOTES
"Marsha Mcneill is a 44 year old female who is being evaluated via a billable telephone visit.      The patient has been notified of following:     \"This telephone visit will be conducted via a call between you and your physician/provider. We have found that certain health care needs can be provided without the need for a physical exam.  This service lets us provide the care you need with a short phone conversation.  If a prescription is necessary we can send it directly to your pharmacy.  If lab work is needed we can place an order for that and you can then stop by our lab to have the test done at a later time.    If during the course of the call the physician/provider feels a telephone visit is not appropriate, you will not be charged for this service.\"     Physician has received verbal consent for a Telephone Visit from the patient? YES    Marsha Mcneill complains of    Chief Complaint   Patient presents with     WOUND CARE     I have reviewed and updated the patient's Past Medical History, Social History, Family History and Medication List.    ALLERGIES  Augmentin; Mushroom; Povidone iodine; Amoxicillin; Aspirin; and Macrobid [nitrofurantoin anhydrous]  Wound (used by OP I only) 01/30/20 1217 Left ischial tuberosity pressure injury (Active)   Length (cm) 0.5 06/16/20 1323   Width (cm) 0.5 06/16/20 1323   Depth (cm) 0.75 06/16/20 1323   Wound (cm^2) 0.25 cm^2 06/16/20 1323   Wound Volume (cm^3) 0.19 cm^3 06/16/20 1323   Wound healing % 83.33 06/16/20 1323   Dressing Appearance moist drainage 06/16/20 1323   Drainage Characteristics/Odor serosanguineous 06/16/20 1323   Drainage Amount moderate 06/16/20 1323   Thickness/Stage Stage 4 06/16/20 1323   Base red/granulating 06/16/20 1323   Periwound intact 06/16/20 1323   Care, Wound non-select wound debridement performed 06/16/20 1323       Andreea Larson presents for a telephone follow-up with Ramy facility nurse.  The focus of this visit is her left IT pressure ulcer.  " Patient is new to me, has previously been seen by SANDRA Benitez. she has been doing well, reports no signs and symptoms of infection.  She is quadriplegic, this wound was initially positive for MRSA.  Also with positive for osteomyelitis on 9/2019.  They have recently been using Tegaderm, and after reviewing pictures this appears to be irritating the wound.     Assessment/Plan:  Left IT pressure ulcer    Regarding the irritation, I considered fungal vs viral vs product induced irritation and breakdown.  Patient states she was just recently on antifungal due to a yeast infection, this rules out the fungal nature of this rash.  Patient resides in a facility, and it is not yet possible for her to come to a clinic visit to be assessed.  We opted to continue the same wound care.     Wound care: Wound cleanser, apply small amount of VASHE on  gauze,lay into wound bed, let sit for 10 minutes, remove gauze (do not rinse) then  apply dressing. Apply Silvadene cream into wound bed, fill dead space with 2x2 gauze  if needed and cover with foam adhesive or ABD with tape. Change dressing daily.     Phone call duration: 8minutes    Zofia COLES, RN, CNP, APRN, CWCN  [facility] [place]

## 2020-06-16 NOTE — DISCHARGE INSTRUCTIONS
[facility]  [address] [address]    Call us at [phone] if you have any questions about your wounds, have redness or  swelling around your wound, have a fever of 101 or greater or if you have any other  problems or concerns. We answer the phone Monday through Friday 8 am to 4 pm,  please leave a message as we check the voicemail frequently throughout the day.    Marsha Mcneill 1975    Júnior Quintero fax [phone]    Medications/supplements to aid in healin. 1 packet of Amadeo Supplement into your favorite beverage TWICE a day  2. Vitamin D3 5,000 iu per day.    Wound Dressing Change:left left ischial tuberosity  After cleansing with saline or wound cleanser, apply small amount of VASHE on gauze,lay into wound bed, let sit for 10 minutes, remove gauze (do not rinse) then apply dressing. Apply Silvadene cream into wound bed, fill dead space with 2x2 gauze if needed and cover with foam adhesive or ABD with tape. Change dressing daily    Zofia Day MSN, RN, CNP, APRN, CWCN, 2020    [facility] follow up with a telephone visit with Dr. Cedeno in 3-4 weeks

## 2020-06-17 ENCOUNTER — VIRTUAL VISIT (OUTPATIENT)
Dept: ANESTHESIOLOGY | Facility: CLINIC | Age: 45
End: 2020-06-17
Payer: MEDICAID

## 2020-06-17 DIAGNOSIS — M25.559 ISCHIAL PAIN, UNSPECIFIED LATERALITY: Primary | ICD-10-CM

## 2020-06-17 ASSESSMENT — PAIN SCALES - GENERAL: PAINLEVEL: SEVERE PAIN (6)

## 2020-06-17 NOTE — LETTER
"6/17/2020       RE: Marsha Mcneill  [address] [facility] [facility] [address]     Dear Colleague,    Thank you for referring your patient, Marsha Mcneill, to the [facility] at [facility]. Please see a copy of my visit note below.    Marsha Mcneill is a 44 year old female who is being evaluated via a billable video visit.        Sunita Luna CMA    Marsha Mcneill is a 44 year old y.o. old female who presents to the pain clinic with \"L ischial tuberosity nerve pain\"     She has a history of MS with associated quadriplegia and spasticity  She has a wound at her left ischium and associated pain.  During her previous visit, it was recommended that sustained relief morphine be prescribed to provide relief throughout the day.  She states that the morphine caused sedation and drowsiness.  She had previously received oxycodone.  She notes that the oxycodone did not cause these symptoms.  She feels that 10 mg oxycodone per day was effective.  We have considered ischial tuberosity or bursa injections, however, we would like to examine the patient, as she describes some decubitus ulcers in the region of the pain.  She currently is not able to leave her nursing facility.  She presents today for further treatment recommendations.  Current Outpatient Medications   Medication     acetaminophen (TYLENOL) 500 MG tablet     baclofen (GABLOFEN) 14563 MCG/20ML injection     baclofen (LIORESAL) 10 MG tablet     calcium carbonate (OS-LILIBETH 600 MG [place]. [place]) 600 MG tablet     calcium carbonate (TUMS) 500 MG chewable tablet     cholecalciferol (VITAMIN D3) 5000 units (125 mcg) capsule     diazepam (VALIUM) 2 MG tablet     doxepin (SINEQUAN) 25 MG capsule     FLUoxetine (PROZAC) 20 MG capsule     gentamicin (GARAMYCIN) 40 MG/ML injection     ibuprofen (ADVIL/MOTRIN) 600 MG tablet     loratadine (CLARITIN) 10 MG tablet     medroxyPROGESTERone (DEPO-PROVERA) 150 MG/ML injection     " Melatonin 3 MG SUBL     morphine (MS CONTIN) 15 MG CR tablet     NEW MED     NONFORMULARY     nystatin (MYCOSTATIN) 874519 UNIT/GM external cream     oxybutynin ER (DITROPAN XL) 15 MG 24 hr tablet     pregabalin (LYRICA) 100 MG capsule     silver sulfADIAZINE (SILVADENE) 1 % external cream     SM NASAL DECONGESTANT PE 10 MG TABS     sodium chloride 0.9%, bottle, 0.9 % irrigation     traZODone (DESYREL) 100 MG tablet     No current facility-administered medications for this visit.      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     Macrobid [Nitrofurantoin Anhydrous] Nausea and Vomiting     Past Medical History:   Diagnosis Date     Anxiety      Chronic pain      Depression      History of DVT (deep vein thrombosis)      Ileostomy status (H)      Insomnia      MRSA (methicillin resistant staph aureus) culture positive      Multiple scleroses      Neurological disorders      Pressure sore      Urinary tract infection      Past Surgical History:   Procedure Laterality Date     BLADDER AUGMENTATION  12/7/2012    Procedure: BLADDER AUGMENTATION;  APPENDECTOMY, BLADDER AUGMENTATION, HUMBERTO BLADDER STOMA;  Surgeon: Errol Westbrook MD;  Location: [facility] OR     ILEOSTOMY  2018     INSERT PUMP BACLOFEN  2009    2 revisions     IRRIGATION AND DEBRIDEMENT DECUBITUS WOUND, COMBINED  09/2019     LAPAROTOMY EXPLORATORY  12/20/2012    Procedure: LAPAROTOMY EXPLORATORY;  Removal of Inter Abdominal Drain;  Surgeon: Errol Westbrook MD;  Location: [facility] OR     LAPAROTOMY EXPLORATORY      ileostomy fro diversion of obstipation     MITROFANOFF PROCEDURE (APPENDIX CONDUIT)  12/7/2012    Procedure: MITROFANOFF PROCEDURE (APPENDIX CONDUIT);;  Surgeon: Errol Westbrook MD;  Location: [facility] OR      SALPINGECTOMY Bilateral 2/26/2020    Procedure: Laparotomy, removal of pelvic mass, both fallopian tubes,;  Surgeon: Tommie Barron MD;  Location:  OR     Family History   Problem Relation Age of Onset     Cancer Mother         non hodgkin's lymphoma      Diabetes Mother      Diabetes Maternal Grandfather      Cardiovascular Maternal Grandfather      Cardiovascular Father      Social History     Socioeconomic History     Marital status:      Spouse name: Not on file     Number of children: Not on file     Years of education: Not on file     Highest education level: Not on file   Occupational History     Not on file   Social Needs     Financial resource strain: Not on file     Food insecurity     Worry: Not on file     Inability: Not on file     Transportation needs     Medical: Not on file     Non-medical: Not on file   Tobacco Use     Smoking status: Never Smoker     Smokeless tobacco: Never Used   Substance and Sexual Activity     Alcohol use: No     Drug use: No     Sexual activity: Not on file   Lifestyle     Physical activity     Days per week: Not on file     Minutes per session: Not on file     Stress: Not on file   Relationships     Social connections     Talks on phone: Not on file     Gets together: Not on file     Attends Denominational service: Not on file     Active member of club or organization: Not on file     Attends meetings of clubs or organizations: Not on file     Relationship status: Not on file     Intimate partner violence     Fear of current or ex partner: Not on file     Emotionally abused: Not on file     Physically abused: Not on file     Forced sexual activity: Not on file   Other Topics Concern     Parent/sibling w/ CABG, MI or angioplasty before 65F 55M? Not Asked   Social History Narrative     Not on file      ROS: 10 point ROS neg other than the symptoms noted above in the HPI.  Physical examination is not performed as this is a virtual visit.  A/P:  Patient is a 45  y/o female who presents with pain that is described in the region of the ischio tuberosity.  Her description is suggestive of pain that might be involving the ischio tuberosity or associated bursae.  Her previous treatment was with oxycodone 5 to 10 mg p.o. daily she notes that this was effective in treating her pain.  On initial evaluation she complained of pain throughout the day and thus it was thought that a sustained-release morphine preparation might more adequately treat her pain.  She states that the morphine causes her to be sedate and she is not able to function adequately.  I would like to examine the patient to determine if a bursa injection might be helpful however the patient cannot leave her nursing facility secondary to the COVID-19 crisis.  In the interim it seems reasonable that she be prescribed oxycodone 5 to 10 mg p.o. daily for treatment of her pain.  We will of course monitor her for sedation, nausea, vomiting, or constipation.  We will also determine if her function is indeed improved with the pharmacologic measures.  When the patient is able to leave her nursing facility we will schedule an in person visit to facilitate physical examination.  The patient verbalizes an understanding of this plan.      AVS sent to pt's MyChart.   Amy Purcell LPN      Again, thank you for allowing me to participate in the care of your patient.      Sincerely,    Juan Jose Higuera MD

## 2020-06-17 NOTE — PROGRESS NOTES
"Marsha Mcneill is a 44 year old female who is being evaluated via a billable video visit.      The patient has been notified of following:     \"This video visit will be conducted via a call between you and your physician/provider. We have found that certain health care needs can be provided without the need for an in-person physical exam.  This service lets us provide the care you need with a video conversation.  If a prescription is necessary we can send it directly to your pharmacy.  If lab work is needed we can place an order for that and you can then stop by our lab to have the test done at a later time.    Video visits are billed at different rates depending on your insurance coverage.  Please reach out to your insurance provider with any questions.    If during the course of the call the physician/provider feels a video visit is not appropriate, you will not be charged for this service.\"    Patient has given verbal consent for Video visit? Yes    [phone]    Will anyone else be joining your video visit? No        Sunita Luna CMA    Marsha Mcneill is a 44 year old y.o. old female who presents to the pain clinic with \"L ischial tuberosity nerve pain\"     She has a history of MS with associated quadriplegia and spasticity  She has a wound at her left ischium and associated pain.  During her previous visit, it was recommended that sustained relief morphine be prescribed to provide relief throughout the day.  She states that the morphine caused sedation and drowsiness.  She had previously received oxycodone.  She notes that the oxycodone did not cause these symptoms.  She feels that 10 mg oxycodone per day was effective.  We have considered ischial tuberosity or bursa injections, however, we would like to examine the patient, as she describes some decubitus ulcers in the region of the pain.  She currently is not able to leave her nursing facility.  She presents today for further treatment " recommendations.  Current Outpatient Medications   Medication     acetaminophen (TYLENOL) 500 MG tablet     baclofen (GABLOFEN) 75390 MCG/20ML injection     baclofen (LIORESAL) 10 MG tablet     calcium carbonate (OS-LILIBETH 600 MG [place]. CA) 600 MG tablet     calcium carbonate (TUMS) 500 MG chewable tablet     cholecalciferol (VITAMIN D3) 5000 units (125 mcg) capsule     diazepam (VALIUM) 2 MG tablet     doxepin (SINEQUAN) 25 MG capsule     FLUoxetine (PROZAC) 20 MG capsule     gentamicin (GARAMYCIN) 40 MG/ML injection     ibuprofen (ADVIL/MOTRIN) 600 MG tablet     loratadine (CLARITIN) 10 MG tablet     medroxyPROGESTERone (DEPO-PROVERA) 150 MG/ML injection     Melatonin 3 MG SUBL     morphine (MS CONTIN) 15 MG CR tablet     NEW MED     NONFORMULARY     nystatin (MYCOSTATIN) 133540 UNIT/GM external cream     oxybutynin ER (DITROPAN XL) 15 MG 24 hr tablet     pregabalin (LYRICA) 100 MG capsule     silver sulfADIAZINE (SILVADENE) 1 % external cream     SM NASAL DECONGESTANT PE 10 MG TABS     sodium chloride 0.9%, bottle, 0.9 % irrigation     traZODone (DESYREL) 100 MG tablet     No current facility-administered medications for this visit.      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     Macrobid [Nitrofurantoin Anhydrous] Nausea and Vomiting     Past Medical History:   Diagnosis Date     Anxiety      Chronic pain      Depression      History of DVT (deep vein thrombosis)      Ileostomy status (H)      Insomnia      MRSA (methicillin resistant staph aureus) culture positive      Multiple scleroses      Neurological disorders      Pressure sore      Urinary tract infection      Past Surgical History:   Procedure Laterality Date     BLADDER AUGMENTATION  12/7/2012    Procedure: BLADDER  AUGMENTATION;  APPENDECTOMY, BLADDER AUGMENTATION, HUMBERTO BLADDER STOMA;  Surgeon: Errol Westbrook MD;  Location: [facility] OR     ILEOSTOMY  2018     INSERT PUMP BACLOFEN  2009    2 revisions     IRRIGATION AND DEBRIDEMENT DECUBITUS WOUND, COMBINED  09/2019     LAPAROTOMY EXPLORATORY  12/20/2012    Procedure: LAPAROTOMY EXPLORATORY;  Removal of Inter Abdominal Drain;  Surgeon: Errol Westbrook MD;  Location: [facility] OR     LAPAROTOMY EXPLORATORY      ileostomy fro diversion of obstipation     MITROFANOFF PROCEDURE (APPENDIX CONDUIT)  12/7/2012    Procedure: MITROFANOFF PROCEDURE (APPENDIX CONDUIT);;  Surgeon: Errol Westbrook MD;  Location: [facility] OR     SALPINGECTOMY Bilateral 2/26/2020    Procedure: Laparotomy, removal of pelvic mass, both fallopian tubes,;  Surgeon: Tommie Barron MD;  Location: [facility] OR     Family History   Problem Relation Age of Onset     Cancer Mother         non hodgkin's lymphoma      Diabetes Mother      Diabetes Maternal Grandfather      Cardiovascular Maternal Grandfather      Cardiovascular Father      Social History     Socioeconomic History     Marital status:      Spouse name: Not on file     Number of children: Not on file     Years of education: Not on file     Highest education level: Not on file   Occupational History     Not on file   Social Needs     Financial resource strain: Not on file     Food insecurity     Worry: Not on file     Inability: Not on file     Transportation needs     Medical: Not on file     Non-medical: Not on file   Tobacco Use     Smoking status: Never Smoker     Smokeless tobacco: Never Used   Substance and Sexual Activity     Alcohol use: No     Drug use: No     Sexual activity: Not on file   Lifestyle     Physical activity     Days per week: Not on file     Minutes per session: Not on file     Stress: Not on file   Relationships     Social connections     Talks on phone: Not on file     Gets together: Not on file     Attends Congregation  service: Not on file     Active member of club or organization: Not on file     Attends meetings of clubs or organizations: Not on file     Relationship status: Not on file     Intimate partner violence     Fear of current or ex partner: Not on file     Emotionally abused: Not on file     Physically abused: Not on file     Forced sexual activity: Not on file   Other Topics Concern     Parent/sibling w/ CABG, MI or angioplasty before 65F 55M? Not Asked   Social History Narrative     Not on file      ROS: 10 point ROS neg other than the symptoms noted above in the HPI.  Physical examination is not performed as this is a virtual visit.  A/P:  Patient is a 44 y/o female who presents with pain that is described in the region of the ischio tuberosity.  Her description is suggestive of pain that might be involving the ischio tuberosity or associated bursae.  Her previous treatment was with oxycodone 5 to 10 mg p.o. daily she notes that this was effective in treating her pain.  On initial evaluation she complained of pain throughout the day and thus it was thought that a sustained-release morphine preparation might more adequately treat her pain.  She states that the morphine causes her to be sedate and she is not able to function adequately.  I would like to examine the patient to determine if a bursa injection might be helpful however the patient cannot leave her nursing facility secondary to the COVID-19 crisis.  In the interim it seems reasonable that she be prescribed oxycodone 5 to 10 mg p.o. daily for treatment of her pain.  We will of course monitor her for sedation, nausea, vomiting, or constipation.  We will also determine if her function is indeed improved with the pharmacologic measures.  When the patient is able to leave her nursing facility we will schedule an in person visit to facilitate physical examination.  The patient verbalizes an understanding of this plan.

## 2020-06-18 NOTE — PATIENT INSTRUCTIONS
Treatment planning:    Recommendations will be written in the providers note for your Care team to review.     Please schedule an IN PERSON visit, when available with Dr. Higuera- for further planing.       Recommended Follow up:  Next Available for an IN PERSON visit.        Please call [phone] to schedule this appointment if you don't already have an appointment made.         To speak with a nurse, schedule/reschedule/cancel a clinic appointment, or request a medication refill call: [phone]    You can also reach us by [employer]: [email or web address]

## 2020-06-19 ENCOUNTER — TELEPHONE (OUTPATIENT)
Dept: WOUND CARE | Facility: CLINIC | Age: 45
End: 2020-06-19

## 2020-06-24 ENCOUNTER — MYC MEDICAL ADVICE (OUTPATIENT)
Dept: WOUND CARE | Facility: CLINIC | Age: 45
End: 2020-06-24

## 2020-06-24 ENCOUNTER — VIRTUAL VISIT (OUTPATIENT)
Dept: WOUND CARE | Facility: CLINIC | Age: 45
End: 2020-06-24
Payer: MEDICAID

## 2020-06-24 DIAGNOSIS — Z93.2 ILEOSTOMY STATUS (H): Primary | ICD-10-CM

## 2020-06-24 NOTE — PATIENT INSTRUCTIONS
The numbers for the pouches are not available in pre-cut so they need to be cut to 22mm (not larger)    Ileostomy care: three times weekly and prn leakage:    Convex pouch, without filter  Coloplast  79860. I'm assuming you do not need a filter to let gas out.   If you do need a filter get: Coloplast 54848  Candida ring/seal  675778  Stoma powder (425799)  Aquacel AG (949002)     1. Remove pouch, cleanse skin around stoma with water and dry  2. Ensure to irrigate tunneling wound at 9 o clock with saline and dry very well  3. Apply stoma powder to irritated skin around stoma, dust off excess  4. Cut a thin strip of Aquacel AG and tuck into wound at 9 o clock, ensuring to leave a long tail for easy removal  5. Apply Candida seal to skin around stoma ensuring to place over Aquacel AG tail, ( Do not leave the tail in the bag, just cover it up)  6. Place the pouch over the stoma and press on the Candida ring firmly.     It will also help to apply some heat from a heating pad maybe or a h ot washcloth    It might help for the nurses to watch this YouTube video: How to Use Candida Cohesive  Seals  Hope this will help with the surrounding skin. When that is better we'll tackle the deeper wound. Ask the nurses to keep me up to date about the depth. You can just send me [employer] message.    Please send me a photo in two weeks after using these products. I will call you again in about 3 weeks to see how things are going. If you can come to clinic that would even be better.    Thank you,  Shelley

## 2020-06-24 NOTE — PROGRESS NOTES
"  [facility] Ostomy Assessment  This visit was a telehealth visit due to Covid19. My clinic does not have the capabilities to do video conferencing at this time. I use telehealth and ask patients to upload photos.    Patient able to upload recent photo through [employer] YES, No      Patient comes to clinic for consultation regarding ostomy issues.    ostomy care is provided by PCA   Consulted per Dr Dyer     Subjective:  Patient is complaining of \"wound near stoma\"     The quantity of ostomy supplies needed by a beneficiary is determined  primarily by the type of ostomy, its location, its construction, and the condition of the skin surface  surrounding the stoma.    Objective:  Type: Ileostomy since 7 years  Stoma: 22 mm  end healthy, pink-red, round, moist and protruberant    Location: left lower quadrant     Complications: deep wound at 7 o'clock   Mucutaneous junction:  intact     Peristomal skin: erythema and erosion of epidermis    Frequency of pouch change: 2-3 days. This is is scheduled.   Current pouch system/supplies:  Flat COloplast, Did not get the recommended roddy product since discharge one piece, cut to fit, flat and ostomy powder    Assessment:    Intervention/Plan:    The numbers for the pouches are not available in pre-cut so they need to be cut to 22mm (not larger)    Ileostomy care: three times weekly and prn leakage:    Convex pouch, without filter  Coloplast  14781. I'm assuming you do not need a filter to let gas out.   If you do need a filter get: Coloplast 14408  Candida ring/seal  976003  Stoma powder (963289)  Aquacel AG (097173)     1. Remove pouch, cleanse skin around stoma with water and dry  2. Ensure to irrigate tunneling wound at 9 o clock with saline and dry very well  3. Apply stoma powder to irritated skin around stoma, dust off excess  4. Cut a thin strip of Aquacel AG and tuck into wound at 9 o clock, ensuring to leave a long tail for easy removal  5. Apply Candida seal to skin " around stoma ensuring to place over Aquacel AG tail, ( Do not leave the tail in the bag, just cover it up)  6. Place the pouch over the stoma and press on the Candida ring firmly.     It will also help to apply some heat from a heating pad maybe or a h ot washcloth    It might help for the nurses to watch this YouTube video: How to Use Candida Cohesive  Seals  Hope this will help with the surrounding skin. When that is better we'll tackle the deeper wound. Ask the nurses to keep me up to date about the depth. You can just send me [employer] message.    Please send me a photo in two weeks after using these products.     Return to clinic  I will call her again in 3 weeks     Maribel Garcia NP was available for supervision of care if needed or if questions should arise and regarding plan of care.      Shelley Leon RN  RN CWON

## 2020-06-26 ENCOUNTER — TELEPHONE (OUTPATIENT)
Dept: ANESTHESIOLOGY | Facility: CLINIC | Age: 45
End: 2020-06-26

## 2020-06-26 NOTE — TELEPHONE ENCOUNTER
I called and LVM for the pt informing her that we have sent a message about signing his note from the visit that she had so that we can send it to her PCP.    If you have any further questions or concerns you can call us back at [phone].    Sunita Luna, [facility]

## 2020-06-26 NOTE — TELEPHONE ENCOUNTER
Health Call Center    Phone Message    May a detailed message be left on voicemail: yes     Reason for Call: Patient calling to get an update on the recommendations that Dr. Higuera was planning to send to Dr. Pardo, patients primary care provider at [facility]. Patient unable to find anything in her chart and wondering if someone could please tell her where to find the information and how it was sent to Dr. Pardo. Patient also asked if it could be sent to her nursing home facility as well. Their fax is [phone].     Action Taken: Message routed to:  [facility]    Travel Screening: Not Applicable

## 2020-06-26 NOTE — TELEPHONE ENCOUNTER
[facility] Call Center    Phone Message    May a detailed message be left on voicemail: yes     Reason for Call: Other: Care Coordinator is requesting a copy of the last visit summary and any changes to orders be faxed to her assisted living at [phone]     Action Taken: Message routed to:  [facility] wound    Travel Screening: Not Applicable

## 2020-06-29 ENCOUNTER — MYC MEDICAL ADVICE (OUTPATIENT)
Dept: WOUND CARE | Facility: CLINIC | Age: 45
End: 2020-06-29

## 2020-06-30 ENCOUNTER — TELEPHONE (OUTPATIENT)
Dept: WOUND CARE | Facility: CLINIC | Age: 45
End: 2020-06-30

## 2020-06-30 NOTE — TELEPHONE ENCOUNTER
Marsha needs help with [facility], the nurse helped her before and she needs it again.

## 2020-07-01 NOTE — TELEPHONE ENCOUNTER
[facility] Call Center    Phone Message    May a detailed message be left on voicemail: yes     Reason for Call: Other: Pt is calling back stating she has sent multiple messages and My Chart messages and still has not received the recommendations, please call this pt back today about this. When calling pt, she states she is handicapped and takes her a moment to get to the phone, please leave a number to call back instead of the call center    Action Taken: Message routed to:  [facility] Pain    Travel Screening: Not Applicable

## 2020-07-07 ENCOUNTER — TELEPHONE (OUTPATIENT)
Dept: ANESTHESIOLOGY | Facility: CLINIC | Age: 45
End: 2020-07-07

## 2020-07-07 NOTE — TELEPHONE ENCOUNTER
[facility] Call Center    Phone Message    May a detailed message be left on voicemail: yes     Reason for Call: Other: Pt would like a call back as she said she hasn't heard that the fax has been sent. Please reach out to pt to discuss     Action Taken: Message routed to:  [facility] Pain    Travel Screening: Not Applicable

## 2020-07-08 NOTE — TELEPHONE ENCOUNTER
I called and spoke with the pt and informed her that I did talk to Dr. Higuera about this yesterday and that he would sign the note.    I informed the pt that I was watching and waiting for the note to be signed so I can fax it to her provider.    Pt stated verbal understanding and had no further questions or concerns.    Sunita Luna, CMA

## 2020-07-14 NOTE — TELEPHONE ENCOUNTER
[facility] Call Center    Phone Message    May a detailed message be left on voicemail: yes     Reason for Call: Other: Patient is calling to FU on message below. Please call patient ASAP to let her know the status of the note. Thank you.      Action Taken: Message routed to:  [facility] Pain    Travel Screening: Not Applicable                                                                         [id number]/MICU

## 2020-07-15 NOTE — TELEPHONE ENCOUNTER
I called and spoke with the pt and informed her that the note was not signed yet.    At this point pt did get upset since it has been a month since her appointment.    I informed the pt that I am watching for the note to be signed and will fax it when it gets signed.    Pt stated verbal understanding and had no further questions or concerns.    Sunita Luna, CMA

## 2020-07-16 ENCOUNTER — VIRTUAL VISIT (OUTPATIENT)
Dept: WOUND CARE | Facility: CLINIC | Age: 45
End: 2020-07-16
Payer: MEDICAID

## 2020-07-16 DIAGNOSIS — Z93.2 ILEOSTOMY STATUS (H): Primary | ICD-10-CM

## 2020-07-16 NOTE — PATIENT INSTRUCTIONS
Go on dairy free diet to prevent gas.  If a product has dairy in it use 2 lactose tablets,take with first bite and chew them well   Apply heat after pouch has been placed.    Just a reminder:    1. Remove pouch, cleanse skin around stoma with water and dry  2. Ensure to irrigate tunneling wound at 9 o clock with saline and dry very well  3. Apply stoma powder to irritated skin around stoma, dust off excess  4. Cut a thin strip of Aquacel AG and tuck into wound at 9 o clock, ensuring to leave a long tail for easy removal  5. Apply Candida seal to skin around stoma ensuring to place over Aquacel AG tail, ( Do not leave the tail in the bag, just cover it up)  6. Place the pouch over the stoma and press on the Candida ring firmly.   7. It will also help to apply some heat from a heating pad maybe or a hot washcloth      I will call again on 08/03/20 at 1:30

## 2020-07-16 NOTE — PROGRESS NOTES
"  [facility] Ostomy Assessment  This visit was a telehealth visit due to Covid19. My clinic does not have the capabilities to do video conferencing at this time. I use telehealth and ask patients to upload photos.    Patient able to upload recent photo through [employer] no  Ostomy care is provided by PCA   Consulted per Dr Dyer     Subjective:  Patient is complaining of \"wound near stoma and tried new products\"     The quantity of ostomy supplies needed by a beneficiary is determined  primarily by the type of ostomy, its location, its construction, and the condition of the skin surface  surrounding the stoma.    Objective:  Type: Ileostomy since 7 years  Stoma: 22 mm  end healthy, pink-red, round, moist and protruberant    Location: left lower quadrant     Complications: deep wound at 7 o'clock   Mucutaneous junction:  intact     Peristomal skin: erythema and erosion of epidermis    Frequency of pouch change: 2-3 days. This is is scheduled.   Current pouch system/supplies:  Just starting the recommendation     Assessment:  Just started with the new pouches last week. Can't say yet if things improved however she did have two leaks. She did say that they had not applied heat to the pouch after it was placed. Strongly recommend that.     Intervention/Plan: reiterated the instructions. Pt states she has a lot of gas, recommendations regarding eliminating dairy and using lactose given. It sounds like the pouches do fill up with gas possibly contributing to leaking     Ileostomy care: three times weekly and prn leakage:    Convex pouch, without filter  Coloplast  88081. I'm assuming you do not need a filter to let gas out.   If you do need a filter get: Coloplast 68391  Candida ring/seal  586991  Stoma powder (371875)  Aquacel AG (966633)     1. Remove pouch, cleanse skin around stoma with water and dry  2. Ensure to irrigate tunneling wound at 9 o clock with saline and dry very well  3. Apply stoma powder to irritated skin " around stoma, dust off excess  4. Cut a thin strip of Aquacel AG and tuck into wound at 9 o clock, ensuring to leave a long tail for easy removal  5. Apply Candida seal to skin around stoma ensuring to place over Aquacel AG tail, ( Do not leave the tail in the bag, just cover it up)  6. Place the pouch over the stoma and press on the Candida ring firmly.   7. It will also help to apply some heat from a heating pad maybe or a hot washcloth    It might help for the nurses to watch this YouTube video: How to Use Candida Cohesive  Seals  Hope this will help with the surrounding skin. When that is better we'll tackle the deeper wound. Ask the nurses to keep me up to date about the depth. You can just send me [employer] message.    Please send me a photo in two weeks after using these products.     Return to clinic  I will call her again in 3 weeks     Maribel Garcia NP was available for supervision of care if needed or if questions should arise and regarding plan of care.      Shelley Leon, RN  RN CWON

## 2020-07-20 NOTE — TELEPHONE ENCOUNTER
I called and LVM for the pt informing them that I faxed Dr. Higuera's note to the requested number below.    If you have any further questions or concerns you can call us back at [phone].    Sunita Luna, ESTHELA

## 2020-07-22 ENCOUNTER — TELEPHONE (OUTPATIENT)
Dept: ANESTHESIOLOGY | Facility: CLINIC | Age: 45
End: 2020-07-22

## 2020-07-22 NOTE — TELEPHONE ENCOUNTER
[facility] Call Center    Phone Message    May a detailed message be left on voicemail: yes     Reason for Call: Other: Becka is calling in they recieved a fax from  from the patient's last appointment with recommendations and  is wanting to clarify if  intends for the patient to have a single dose of IR oxycodone to control her pain on a scheduled plan. He feels that a single dose of short acting pain medication once a day will not be affective for the patient. Please call Becka back to discuss asap. Thank you.    Action Taken: Message routed to:  [facility] pain    Travel Screening: Not Applicable

## 2020-07-24 NOTE — TELEPHONE ENCOUNTER
Voice message left for Becka to follow up on call. RNCC left direct call back number and advised Becka to call back if there are still questions about Dr. Higuera's recommendations.     LOUIS GalindoN, RN

## 2020-08-03 ENCOUNTER — VIRTUAL VISIT (OUTPATIENT)
Dept: WOUND CARE | Facility: CLINIC | Age: 45
End: 2020-08-03
Payer: MEDICAID

## 2020-08-03 NOTE — PROGRESS NOTES
"  [facility] Ostomy Assessment  This visit was a telehealth visit due to Covid19. I use telehealth and ask patients to upload photos.    Patient able to upload recent photo through [employer] no but will today  Ostomy care is provided by PCA   Consulted per Dr Dyer     Subjective:  Patient is complaining of \"wound near stoma getting less deep\"     The quantity of ostomy supplies needed by a beneficiary is determined  primarily by the type of ostomy, its location, its construction, and the condition of the skin surface  surrounding the stoma.    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 This is is scheduled.   Current pouch system/supplies:  Just starting the recommended pouches          Assessment:  Things are improving and she is not leaking. Aquacel Ag falls out of the wound     Intervention/Plan: reiterated the instructions. Pt states she has a lot of gas, recommendations regarding eliminating dairy and using lactose given. She states that the lactate tablets don't help    Ileostomy care: three times weekly and prn leakage:    Convex pouch, without filter  Coloplast  45721. I'm assuming you do not need a filter to let gas out.   If you do need a filter get: Coloplast 32066  Candida ring/seal  089966  Stoma powder (994059)  Aquacel AG ([id number])     1. Remove pouch, cleanse skin around stoma with water and dry  2. Ensure to irrigate tunneling wound at 9 o clock with saline and dry very well  3. Apply stoma powder to irritated skin around stoma, dust off excess  4. Cut a thin strip of Aquacel AG and tuck into wound at 9 o clock, ensuring to leave a long tail for easy removal  5. Apply Candida seal to skin around " stoma ensuring to place over Aquacel AG tail, ( Do not leave the tail in the bag, just cover it up)  6. Place the pouch over the stoma and press on the Candida ring firmly.   7. It will also help to apply some heat from a heating pad maybe or a hot washcloth    Return to clinic  I will call her again in 3 weeks     Maribel Garcia NP was available for supervision of care if needed or if questions should arise and regarding plan of care.      Shelley Leon RN  RN CWON

## 2020-08-17 ENCOUNTER — PRE VISIT (OUTPATIENT)
Dept: UROLOGY | Facility: CLINIC | Age: 45
End: 2020-08-17

## 2020-08-17 DIAGNOSIS — N31.9 NEUROGENIC BLADDER: Primary | ICD-10-CM

## 2020-08-17 DIAGNOSIS — N39.0 RECURRENT UTI: ICD-10-CM

## 2020-08-17 RX ORDER — CIPROFLOXACIN 500 MG/1
500 TABLET, FILM COATED ORAL DAILY
Qty: 3 TABLET | Refills: 0 | Status: SHIPPED | OUTPATIENT
Start: 2020-08-23 | End: 2020-08-26

## 2020-08-17 NOTE — TELEPHONE ENCOUNTER
Visit Type : Cysto-Botox     Hx/Sx: NGB w/ leaking     Records/Orders: Cipro sent    Pt Contacted: Called 08/17/20 at 12:49 PM and reviewed prep and pt denied UA/UC    At Rooming: Check on scope and site to prep

## 2020-08-18 DIAGNOSIS — N31.9 NEUROGENIC BLADDER: Primary | ICD-10-CM

## 2020-08-18 DIAGNOSIS — N39.0 RECURRENT UTI: ICD-10-CM

## 2020-08-19 ENCOUNTER — TELEPHONE (OUTPATIENT)
Dept: UROLOGY | Facility: CLINIC | Age: 45
End: 2020-08-19

## 2020-08-19 NOTE — TELEPHONE ENCOUNTER
Per pt request, when spoken to on the phone, the Cipro was sent E-rxn to [facility].    Order was printed and faxed to the number provided.  Cipro once per day for 3 days. Day before Botox, day of botox and day after botox.      Yogi Almonte, EMT

## 2020-08-19 NOTE — TELEPHONE ENCOUNTER
[facility] Call Center    Phone Message    May a detailed message be left on voicemail: yes     Reason for Call: Order(s): Other:   Reason for requested: ciprofloxacin (CIPRO) 500 MG tablet   Date needed: asap  Provider name:     [facility] is needing an order faxed to [phone] that states when, and how much medication pt should be taking, please fax, thanks!      Action Taken: Message routed to:  [facility]rology    Travel Screening: Not Applicable

## 2020-08-19 NOTE — PROGRESS NOTES
Message   Received: Today   Message Contents   Timmy Shukla MD sent to Yogi Almonte, EMT               200u in 20 with the flexible cystoscope as well!     Thanks

## 2020-08-24 ENCOUNTER — OFFICE VISIT (OUTPATIENT)
Dept: UROLOGY | Facility: CLINIC | Age: 45
End: 2020-08-24
Payer: MEDICAID

## 2020-08-24 ENCOUNTER — TELEPHONE (OUTPATIENT)
Dept: UROLOGY | Facility: CLINIC | Age: 45
End: 2020-08-24

## 2020-08-24 DIAGNOSIS — N39.0 RECURRENT UTI: ICD-10-CM

## 2020-08-24 DIAGNOSIS — N31.9 NEUROGENIC BLADDER: Primary | ICD-10-CM

## 2020-08-24 RX ORDER — CIPROFLOXACIN 500 MG/1
500 TABLET, FILM COATED ORAL 2 TIMES DAILY
Qty: 1 TABLET | Refills: 0 | Status: SHIPPED | OUTPATIENT
Start: 2020-08-24 | End: 2021-01-08

## 2020-08-24 RX ORDER — PREGABALIN 150 MG/1
CAPSULE ORAL
COMMUNITY
Start: 2020-08-12 | End: 2023-02-14

## 2020-08-24 RX ORDER — CIPROFLOXACIN 500 MG/1
500 TABLET, FILM COATED ORAL ONCE
Status: COMPLETED | OUTPATIENT
Start: 2020-08-24 | End: 2020-08-24

## 2020-08-24 RX ORDER — OXYCODONE HYDROCHLORIDE 5 MG/1
5 TABLET ORAL PRN
COMMUNITY
Start: 2020-08-18 | End: 2023-02-14

## 2020-08-24 RX ADMIN — CIPROFLOXACIN 500 MG: 500 TABLET, FILM COATED ORAL at 15:49

## 2020-08-24 NOTE — TELEPHONE ENCOUNTER
Called pharmacy and told them just one tablet after procedure  Sent message to MD to clarify Manisha Lloyd LPN Staff Nurse  [facility] Call Center    Phone Message    May a detailed message be left on voicemail: no     Reason for Call: Medication Question or concern regarding medication   Prescription Clarification  Name of Medication: ciprofloxacin (CIPRO) 500 MG tablet   Prescribing Provider: Dr. Shukla   Pharmacy: [facility], [place]   What on the order needs clarification? 2 scripts sent, stating to take once, but script also says take 1 by mouth 2 times daily with disp quantity of 1 tablet. Please call pharmacy to clarify.          Action Taken: Message routed to:  [facility] UROLOGY ADULT [facility] [place]    Travel Screening: Not Applicable

## 2020-08-24 NOTE — LETTER
"8/24/2020       RE: Marsha Mcneill  [address] [facility] [address]     Dear Colleague,    Thank you for referring your patient, Marsha Mcneill, to the [facility] UROLOGY AND INST FOR PROSTATE AND UROLOGIC CANCERS at [facility]. Please see a copy of my visit note below.    Reason for visit:  Cystoscopy, botox injection    HPI:  Marsha Mcneill is a 44 year old female with hx of progressive MS, NGB, and neurogenic bowel. She is s/p ileocystoplasty with Erik catheterizable channel in Dec 2012, and diverting ileostomy at [facility] in 2018.     She has had increasing difficulty catheterizing the Erik with incontinence per urethra and Erik, as well as recurrent UTIs. Her nurse caths 4-6 times per day. She leaks from the Erik \"quite bit\" so she wears a stomach pad and leaking per urethra is better but she sill has occasional accidents and wears a pad.  She is taking 30 mg of Ditropan XL. She is getting gentamicin irrigation daily. She has not had any recent infections.      Current Outpatient Medications   Medication Sig Dispense Refill     ciprofloxacin (CIPRO) 500 MG tablet Take 1 tablet (500 mg) by mouth 2 times daily 1 tablet 0     acetaminophen (TYLENOL) 500 MG tablet Take 1,000 mg by mouth 3 times daily        baclofen (GABLOFEN) 48287 MCG/20ML injection As of February 3, 2020   Drug: Baclofen 2000 mcg/mL   Rate (simple continuous rate): 459.5 mcg/day   Low reservoir alarm date: 4/16/20  Managed by Dr. Martinez       baclofen (LIORESAL) 10 MG tablet Take 10 mg by mouth as needed        calcium carbonate (OS-LILIBETH 600 MG [place]. [place]) 600 MG tablet Take 600 mg by mouth At Bedtime        calcium carbonate (TUMS) 500 MG chewable tablet Take 1-2 chew tab by mouth daily as needed for heartburn       cholecalciferol (VITAMIN D3) 5000 units (125 mcg) capsule Take 1 capsule (5,000 Units) by mouth daily 30 capsule 11     ciprofloxacin (CIPRO) 500 MG tablet Take 1 tablet (500 mg) by mouth daily for 3 days 3 " tablet 0     diazepam (VALIUM) 2 MG tablet Take 2 mg by mouth 2 times daily as needed for muscle spasms        doxepin (SINEQUAN) 25 MG capsule TAKE 1 CAPSULE BY MOUTH DAILY AT BEDTIME  99     FLUoxetine (PROZAC) 20 MG capsule Take 60 mg by mouth daily        gentamicin (GARAMYCIN) 40 MG/ML injection        ibuprofen (ADVIL/MOTRIN) 600 MG tablet Take 1 tablet (600 mg) by mouth every 6 hours as needed for moderate pain 12 tablet 0     loratadine (CLARITIN) 10 MG tablet Take 10 mg by mouth as needed        medroxyPROGESTERone (DEPO-PROVERA) 150 MG/ML injection Inject 150 mg into the muscle every 3 months.       Melatonin 3 MG SUBL 6 mg by Oral or Feeding Tube route At Bedtime       morphine (MS CONTIN) 15 MG CR tablet TAKE 1 TABLET (15 MG) BY MOUTH TWICE DAILY FOR 28 DAYS.       NEW  mg gentamicin in 1 Liter 0.9 Normal Saline.  Instill 30 ml of gentamicin solution into bladder at HS. 900 mL 11     NONFORMULARY INSTILL 30ML OF GENTAMICIN SOLUTION INTO BLADDER AT BEDTIME       nystatin (MYCOSTATIN) 816745 UNIT/GM external cream 2 times daily as needed        oxybutynin ER (DITROPAN XL) 15 MG 24 hr tablet Take 2 tablets (30 mg) by mouth daily 60 tablet 3     oxyCODONE (ROXICODONE) 5 MG tablet Take 5 mg by mouth as needed       pregabalin (LYRICA) 150 MG capsule TAKE 1 CAPSULE (150 MG) BY MOUTH THREE TIMES DAILY.       silver sulfADIAZINE (SILVADENE) 1 % external cream APPLY TOPICALLY TO WOUND ONCE DAILY AFTER CLEANING AREA THEN BANDGAE AND WRAP WOUND       SM NASAL DECONGESTANT PE 10 MG TABS TAKE 1 TABLET BY MOUTH ONCE DAILY AS NEEDED       sodium chloride 0.9%, bottle, 0.9 % irrigation        traZODone (DESYREL) 100 MG tablet TAKE 1 TAB BY MOUTH AT BEDTIME  99       Allergies   Allergen Reactions     Augmentin Unknown, GI Disturbance and Nausea and Vomiting     vomiting       Mushroom GI Disturbance     vomiting     Povidone Iodine Rash     Amoxicillin Nausea and Vomiting     Aspirin Other (See Comments)     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     Macrobid [Nitrofurantoin Anhydrous] Nausea and Vomiting     Past Medical History:   Diagnosis Date     Anxiety      Chronic pain      Depression      History of DVT (deep vein thrombosis)      Ileostomy status (H)      Insomnia      MRSA (methicillin resistant staph aureus) culture positive      Multiple scleroses      Neurological disorders      Pressure sore      Urinary tract infection      Past Surgical History:   Procedure Laterality Date     BLADDER AUGMENTATION  12/7/2012    Procedure: BLADDER AUGMENTATION;  APPENDECTOMY, BLADDER AUGMENTATION, HUMBERTO BLADDER STOMA;  Surgeon: Errol Westbrook MD;  Location: [facility]     ILEOSTOMY  2018     INSERT PUMP BACLOFEN  2009    2 revisions     IRRIGATION AND DEBRIDEMENT DECUBITUS WOUND, COMBINED  09/2019     LAPAROTOMY EXPLORATORY  12/20/2012    Procedure: LAPAROTOMY EXPLORATORY;  Removal of Inter Abdominal Drain;  Surgeon: Errol Westbrook MD;  Location[facility]     LAPAROTOMY EXPLORATORY      ileostomy fro diversion of obstipation     MITROFANOFF PROCEDURE (APPENDIX CONDUIT)  12/7/2012    Procedure: MITROFANOFF PROCEDURE (APPENDIX CONDUIT);;  Surgeon: Errol Westbrook MD;  Location: [facility]     SALPINGECTOMY Bilateral 2/26/2020    Procedure: Laparotomy, removal of pelvic mass, both fallopian tubes,;  Surgeon: Tommie Barron MD;  Location: [facility] OR       ROS - see hpi otherwise rest is negative     PROCEDURE:   After dilating the stoma up with a 14 Singaporean, 16 Singaporean and 18 Singaporean catheter the flexible cystoscope was inserted through the catheterizable stoma and down into the bladder. The catheterizable channel was healthy throughout with no evidence of strictures or false passages. The connection between the channel and the bladder was wide open with only weak evidence of a valve or tunnel. The bladder was then entered with the scope and there was no evidence of any stones or mucus. We then mixed 200u of Botox in 20cc sterile  saline. We performed a template injection procedure with 5 columns of 4 injections. All injections were placed deep to the mucosa into the detrusor muscle.  We then emptied her bladder.     LABS:   Creatinine   Date Value Ref Range Status   02/27/2020 0.36 (L) 0.52 - 1.04 mg/dL Final   02/03/2020 0.46 (L) 0.52 - 1.04 mg/dL Final   01/07/2020 0.40 (L) 0.70 - 1.30 mg/dL Final   12/21/2012 0.54 0.52 - 1.04 mg/dL Final   12/19/2012 0.54 0.52 - 1.04 mg/dL Final        IMAGING:   No results found.    Assessment/Plan: 45 year old female with NGB s/p ileocystoplasty with Erik catheterizable channel in Dec 2012 and urinary incontinence per urethra and erik channel refractory to oral medications now s/p Botox 200u.     - Continue ditropan for now. Pt can slowly start decreasing dose if leakage improves significantly with botox  - If no improvement, can consider botox in the OR with rigid cystoscope.  - Renal ultrasound and Cr prior to next visit  - Follow up in 6 months for repeat botox.    Timmy Shukla MD  Reconstructive Urology Fellow

## 2020-08-24 NOTE — PROGRESS NOTES
"Reason for visit:  Cystoscopy, botox injection    HPI:  Marsha Mcneill is a 44 year old female with hx of progressive MS, NGB, and neurogenic bowel. She is s/p ileocystoplasty with Erik catheterizable channel in Dec 2012, and diverting ileostomy at [facility] [place] in 2018.     She has had increasing difficulty catheterizing the Erik with incontinence per urethra and Erik, as well as recurrent UTIs. Her nurse caths 4-6 times per day. She leaks from the Erik \"quite bit\" so she wears a stomach pad and leaking per urethra is better but she sill has occasional accidents and wears a pad.  She is taking 30 mg of Ditropan XL. She is getting gentamicin irrigation daily. She has not had any recent infections.      Current Outpatient Medications   Medication Sig Dispense Refill     ciprofloxacin (CIPRO) 500 MG tablet Take 1 tablet (500 mg) by mouth 2 times daily 1 tablet 0     acetaminophen (TYLENOL) 500 MG tablet Take 1,000 mg by mouth 3 times daily        baclofen (GABLOFEN) 45616 MCG/20ML injection As of February 3, 2020   Drug: Baclofen 2000 mcg/mL   Rate (simple continuous rate): 459.5 mcg/day   Low reservoir alarm date: 4/16/20  Managed by Dr. Martinez       baclofen (LIORESAL) 10 MG tablet Take 10 mg by mouth as needed        calcium carbonate (OS-LILIBETH 600 MG [place]. [place]) 600 MG tablet Take 600 mg by mouth At Bedtime        calcium carbonate (TUMS) 500 MG chewable tablet Take 1-2 chew tab by mouth daily as needed for heartburn       cholecalciferol (VITAMIN D3) 5000 units (125 mcg) capsule Take 1 capsule (5,000 Units) by mouth daily 30 capsule 11     ciprofloxacin (CIPRO) 500 MG tablet Take 1 tablet (500 mg) by mouth daily for 3 days 3 tablet 0     diazepam (VALIUM) 2 MG tablet Take 2 mg by mouth 2 times daily as needed for muscle spasms        doxepin (SINEQUAN) 25 MG capsule TAKE 1 CAPSULE BY MOUTH DAILY AT BEDTIME  99     FLUoxetine (PROZAC) 20 MG capsule Take 60 mg by mouth daily        gentamicin (GARAMYCIN) 40 MG/ML injection    "     ibuprofen (ADVIL/MOTRIN) 600 MG tablet Take 1 tablet (600 mg) by mouth every 6 hours as needed for moderate pain 12 tablet 0     loratadine (CLARITIN) 10 MG tablet Take 10 mg by mouth as needed        medroxyPROGESTERone (DEPO-PROVERA) 150 MG/ML injection Inject 150 mg into the muscle every 3 months.       Melatonin 3 MG SUBL 6 mg by Oral or Feeding Tube route At Bedtime       morphine (MS CONTIN) 15 MG CR tablet TAKE 1 TABLET (15 MG) BY MOUTH TWICE DAILY FOR 28 DAYS.       NEW  mg gentamicin in 1 Liter 0.9 Normal Saline.  Instill 30 ml of gentamicin solution into bladder at HS. 900 mL 11     NONFORMULARY INSTILL 30ML OF GENTAMICIN SOLUTION INTO BLADDER AT BEDTIME       nystatin (MYCOSTATIN) 753912 UNIT/GM external cream 2 times daily as needed        oxybutynin ER (DITROPAN XL) 15 MG 24 hr tablet Take 2 tablets (30 mg) by mouth daily 60 tablet 3     oxyCODONE (ROXICODONE) 5 MG tablet Take 5 mg by mouth as needed       pregabalin (LYRICA) 150 MG capsule TAKE 1 CAPSULE (150 MG) BY MOUTH THREE TIMES DAILY.       silver sulfADIAZINE (SILVADENE) 1 % external cream APPLY TOPICALLY TO WOUND ONCE DAILY AFTER CLEANING AREA THEN BANDGAE AND WRAP WOUND       SM NASAL DECONGESTANT PE 10 MG TABS TAKE 1 TABLET BY MOUTH ONCE DAILY AS NEEDED       sodium chloride 0.9%, bottle, 0.9 % irrigation        traZODone (DESYREL) 100 MG tablet TAKE 1 TAB BY MOUTH AT BEDTIME  99       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     Macrobid [Nitrofurantoin Anhydrous] Nausea and Vomiting     Past Medical History:   Diagnosis Date     Anxiety      Chronic pain      Depression      History of DVT (deep vein thrombosis)      Ileostomy status (H)      Insomnia      MRSA (methicillin resistant staph aureus)  culture positive      Multiple scleroses      Neurological disorders      Pressure sore      Urinary tract infection      Past Surgical History:   Procedure Laterality Date     BLADDER AUGMENTATION  12/7/2012    Procedure: BLADDER AUGMENTATION;  APPENDECTOMY, BLADDER AUGMENTATION, HUMBERTO BLADDER STOMA;  Surgeon: Errol Westbrook MD;  [facility]     ILEOSTOMY  2018     INSERT PUMP BACLOFEN  2009    2 revisions     IRRIGATION AND DEBRIDEMENT DECUBITUS WOUND, COMBINED  09/2019     LAPAROTOMY EXPLORATORY  12/20/2012    Procedure: LAPAROTOMY EXPLORATORY;  Removal of Inter Abdominal Drain;  Surgeon: Errol Westbrook MD;  [facility]     LAPAROTOMY EXPLORATORY      ileostomy fro diversion of obstipation     MITROFANOFF PROCEDURE (APPENDIX CONDUIT)  12/7/2012    Procedure: MITROFANOFF PROCEDURE (APPENDIX CONDUIT);;  Surgeon: Errol Westbrook MD;  [facility]     SALPINGECTOMY Bilateral 2/26/2020    Procedure: Laparotomy, removal of pelvic mass, both fallopian tubes,;  Surgeon: Tommie Barron MD;  Location[facility]       ROS - see hpi otherwise rest is negative     PROCEDURE:   After dilating the stoma up with a 14 Nauruan, 16 Nauruan and 18 Nauruan catheter the flexible cystoscope was inserted through the catheterizable stoma and down into the bladder. The catheterizable channel was healthy throughout with no evidence of strictures or false passages. The connection between the channel and the bladder was wide open with only weak evidence of a valve or tunnel. The bladder was then entered with the scope and there was no evidence of any stones or mucus. We then mixed 200u of Botox in 20cc sterile saline. We performed a template injection procedure with 5 columns of 4 injections. All injections were placed deep to the mucosa into the detrusor muscle.  We then emptied her bladder.     LABS:   Creatinine   Date Value Ref Range Status   02/27/2020 0.36 (L) 0.52 - 1.04 mg/dL Final   02/03/2020  0.46 (L) 0.52 - 1.04 mg/dL Final   01/07/2020 0.40 (L) 0.70 - 1.30 mg/dL Final   12/21/2012 0.54 0.52 - 1.04 mg/dL Final   12/19/2012 0.54 0.52 - 1.04 mg/dL Final        IMAGING:   No results found.    Assessment/Plan: 45 year old female with NGB s/p ileocystoplasty with Erik catheterizable channel in Dec 2012 and urinary incontinence per urethra and erik channel refractory to oral medications now s/p Botox 200u.     - Continue ditropan for now. Pt can slowly start decreasing dose if leakage improves significantly with botox  - If no improvement, can consider botox in the OR with rigid cystoscope.  - Renal ultrasound and Cr prior to next visit  - Follow up in 6 months for repeat botox.    Timmy Shukla MD  Reconstructive Urology Fellow

## 2020-08-24 NOTE — PATIENT INSTRUCTIONS
"Please schedule imaging and another botox injection in 6 months with Dr. Westbrook. If leaking significantly improves in one month feel free to slowly discontinue ditropan. Contact our clinic if still leaking has not improved in one month.     It was a pleasure meeting with you today.  Thank you for allowing me and my team the privilege of caring for you today.  YOU are the reason we are here, and I truly hope we provided you with the excellent service you deserve.  Please let us know if there is anything else we can do for you so that we can be sure you are leaving completely satisfied with your care experience.          AFTER YOUR CYSTOSCOPY        You have just completed a cystoscopy, or \"cysto\", which allowed your physician to learn more about your bladder (or to remove a stent placed after surgery). We suggest that you continue to avoid caffeine, fruit juice, and alcohol for the next 24 hours, however, you are encouraged to return to your normal activities.         A few things that are considered normal after your cystoscopy:     * Small amount of bleeding (or spotting) that clears within the next 24 hours     * Slight burning sensation with urination     * Sensation to of needing to avoid more frequently     * The feeling of \"air\" in your urine     * Mild discomfort that is relieved with Tylenol        Please contact our office promptly if you:     * Develop a fever above 101 degrees     * Are unable to urinate     * Develop bright red blood that does not stop     * Severe pain or swelling         Please contact our office with any concerns or questions @[email or web address].  "

## 2020-08-24 NOTE — NURSING NOTE
Chief Complaint   Patient presents with     Cystoscopy     Botox       not currently breastfeeding. There is no height or weight on file to calculate BMI.    Patient Active Problem List   Diagnosis     Neurogenic bladder     MS (multiple sclerosis) (H)     DVT (deep venous thrombosis) (H)     Mild major depression (H)     Anxiety     Abdominal pain     Bilateral lower extremity edema     Bladder spasm     Chronic fatigue     Chronic pain disorder     Constipation     Depression     History of recurrent UTI (urinary tract infection)     Ileostomy in place (H)     Ileus (H)     Neutropenia (H)     Onycholysis of toenail     Osteomyelitis (H)     Poor venous access     Pressure injury of left ischium, stage 4 (H)     Recurrent UTI     Sleep difficulties     Spasticity     Tetraparesis (H)     Vaginal candidiasis     Urinary tract infection associated with catheterization of urinary tract (H)     Ovarian mass     S/P right oophorectomy     Ileostomy status (H)     Peristomal skin complication       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     Macrobid [Nitrofurantoin Anhydrous] Nausea and Vomiting       Current Outpatient Medications   Medication Sig Dispense Refill     acetaminophen (TYLENOL) 500 MG tablet Take 1,000 mg by mouth 3 times daily        baclofen (GABLOFEN) 80350 MCG/20ML injection As of February 3, 2020   Drug: Baclofen 2000 mcg/mL   Rate (simple continuous rate): 459.5 mcg/day   Low reservoir alarm date: 4/16/20  Managed by Dr. Martinez       baclofen (LIORESAL) 10 MG tablet Take 10 mg by mouth as needed        calcium carbonate (OS-LILIBETH 600 MG [place]. CA) 600 MG tablet Take 600 mg by mouth At Bedtime        calcium carbonate (TUMS) 500 MG chewable tablet Take 1-2 chew tab by mouth  daily as needed for heartburn       cholecalciferol (VITAMIN D3) 5000 units (125 mcg) capsule Take 1 capsule (5,000 Units) by mouth daily 30 capsule 11     ciprofloxacin (CIPRO) 500 MG tablet Take 1 tablet (500 mg) by mouth daily for 3 days 3 tablet 0     diazepam (VALIUM) 2 MG tablet Take 2 mg by mouth 2 times daily as needed for muscle spasms        doxepin (SINEQUAN) 25 MG capsule TAKE 1 CAPSULE BY MOUTH DAILY AT BEDTIME  99     FLUoxetine (PROZAC) 20 MG capsule Take 60 mg by mouth daily        gentamicin (GARAMYCIN) 40 MG/ML injection        ibuprofen (ADVIL/MOTRIN) 600 MG tablet Take 1 tablet (600 mg) by mouth every 6 hours as needed for moderate pain 12 tablet 0     loratadine (CLARITIN) 10 MG tablet Take 10 mg by mouth as needed        medroxyPROGESTERone (DEPO-PROVERA) 150 MG/ML injection Inject 150 mg into the muscle every 3 months.       Melatonin 3 MG SUBL 6 mg by Oral or Feeding Tube route At Bedtime       morphine (MS CONTIN) 15 MG CR tablet TAKE 1 TABLET (15 MG) BY MOUTH TWICE DAILY FOR 28 DAYS.       NEW  mg gentamicin in 1 Liter 0.9 Normal Saline.  Instill 30 ml of gentamicin solution into bladder at HS. 900 mL 11     NONFORMULARY INSTILL 30ML OF GENTAMICIN SOLUTION INTO BLADDER AT BEDTIME       nystatin (MYCOSTATIN) 563536 UNIT/GM external cream 2 times daily as needed        oxybutynin ER (DITROPAN XL) 15 MG 24 hr tablet Take 2 tablets (30 mg) by mouth daily 60 tablet 3     pregabalin (LYRICA) 100 MG capsule 150 mg 3 times daily        silver sulfADIAZINE (SILVADENE) 1 % external cream APPLY TOPICALLY TO WOUND ONCE DAILY AFTER CLEANING AREA THEN BANDGAE AND WRAP WOUND       SM NASAL DECONGESTANT PE 10 MG TABS TAKE 1 TABLET BY MOUTH ONCE DAILY AS NEEDED       sodium chloride 0.9%, bottle, 0.9 % irrigation        traZODone (DESYREL) 100 MG tablet TAKE 1 TAB BY MOUTH AT BEDTIME  99       Social History     Tobacco Use     Smoking status: Never Smoker     Smokeless tobacco: Never Used    Substance Use Topics     Alcohol use: No     Drug use: No       Invasive Procedure Safety Checklist:    Procedure: Cystoscopy    Action: Complete sections and checkboxes as appropriate.    Pre-procedure:  1. Patient ID Verified with 2 identifiers (Bianca and  or MRN) : YES    2. Procedure and site verified with patient/designee (when able) : YES    3. Accurate consent documentation in medical record : YES    4. H&P (or appropriate assessment) documented in medical record : N/A  H&P must be up to 30 days prior to procedure an updated within 24 hours of                 Procedure as applicable.     5. Relevant diagnostic and radiology test results appropriately labeled and displayed as applicable : YES    6. Blood products, implants, devices, and/or special equipment available for the procedure as applicable : YES    7. Procedure site(s) marked with provider initials [Exclusions: none] : NO    8. Marking not required. Reason : Yes  Procedure does not require site marking    Time Out:     Time-Out performed immediately prior to starting procedure, including verbal and active participation of all team members addressing: YES    1. Correct patient identity.  2. Confirmed that the correct side and site are marked.  3. An accurate procedure to be done.  4. Agreement on the procedure to be done.  5. Correct patient position.  6. Relevant images and results are properly labeled and appropriately displayed.  7. The need to administer antibiotics or fluids for irrigation purposes during the procedure as applicable.  8. Safety precautions based on patient history or medication use.    During Procedure: Verification of correct person, site, and procedure occurs any time the responsibility for care of the patient is transferred to another member of the care team.    The following medication was given:     MEDICATION: Onabotulinumtoxin A  ROUTE: IM  SITE: Bladder   DOSE: 10mL  LOT #: [id number]  : [employer]   EXPIRATION  DATE: 04/2023  NDC#: [address]-96   Was there drug waste? No    The following medication was given:     MEDICATION:  Ciproflaxacin  ROUTE: PO  SITE: Medication was given orally   DOSE: 500mg  LOT #: [id number]  : Julia  EXPIRATION DATE: Jun 2021  NDC#: [address]-88   Was there drug waste? No      Prior to injection, verified patient identity using patient's name and date of birth.  Due to injection administration, patient instructed to remain in clinic for 15 minutes  afterwards, and to report any adverse reaction to me immediately.    Drug Amount Wasted:  None.  Vial/Syringe: Single dose vial      CARIDAD Moncada  8/24/2020  2:17 PM

## 2020-08-25 ENCOUNTER — TELEPHONE (OUTPATIENT)
Dept: UROLOGY | Facility: CLINIC | Age: 45
End: 2020-08-25

## 2020-08-25 NOTE — TELEPHONE ENCOUNTER
[facility] Call Center    Phone Message    May a detailed message be left on voicemail: yes     Reason for Call: Medication Question or concern regarding medication   Prescription Clarification  Name of Medication: Cipro  Prescribing Provider:    Timmy Shukla MD       Pharmacy: [id number] [place] - [address]    What on the order needs clarification? Pt wants to make sure sig was right and she is ok finishing today with only 1 tablet.    Also Pt is debating the meaning of some of the summary regarding treatment.  Can you please fax them a copy of the visit summary after reading it for clarity - might even need to call and discuss.    Fax number is [phone]          Action Taken: Message routed to:  [facility]rology    Travel Screening: Not Applicable

## 2020-08-25 NOTE — TELEPHONE ENCOUNTER
Contacted patient to discuss RX. Informed patient that the RX was accidentally sent to the pharmacy. The Rx for one tablet was regarding the one that was given in clinic yesterday.  Coty Velasquez LPN

## 2020-08-26 ENCOUNTER — TELEPHONE (OUTPATIENT)
Dept: UROLOGY | Facility: CLINIC | Age: 45
End: 2020-08-26

## 2020-08-26 NOTE — TELEPHONE ENCOUNTER
Patient's nurse called and told one cipro no more should be done Manisha Lloyd, GARYN Staff Nurse

## 2020-08-26 NOTE — TELEPHONE ENCOUNTER
[facility] Call Center    Phone Message    May a detailed message be left on voicemail: yes     Reason for Call: Other: Marsha calling to request that her care team sends an order to her living facility to discontinue her ciprofloxacin (CIPRO) 500 MG tablet medication. Marsha says this is needed by her care team at [facility]. Marsha says the order can be faxed to the number [phone]. Marsha says there has been some confusion regarding this medication, so she hopes this order will help her care team at [facility] as well. Please give Marsha a call back if there are any questions.     Action Taken: Message routed to:  [facility]  Uro    Travel Screening: Not Applicable

## 2020-08-26 NOTE — TELEPHONE ENCOUNTER
[facility] Call Center    Phone Message    May a detailed message be left on voicemail: no     Reason for Call: Medication Question or concern regarding medication   Prescription Clarification  Name of Medication: ciprofloxacin (CIPRO) 500 MG tablet   Prescribing Provider: Dr. Shukla   Pharmacy: [facility], [place]   What on the order needs clarification? Nursing home needs an end date on taking this med, there is none listed on the script. Please call back.          Action Taken: Message routed to:  [facility]    Travel Screening: Not Applicable

## 2020-08-28 NOTE — TELEPHONE ENCOUNTER
Faxed Cipro discontinuation orders to [facility] at [phone].    Radha Earl RN, BSN  Care Coordinator- Reconstructive Urology

## 2020-09-08 ENCOUNTER — TELEPHONE (OUTPATIENT)
Dept: WOUND CARE | Facility: CLINIC | Age: 45
End: 2020-09-08

## 2020-09-08 NOTE — TELEPHONE ENCOUNTER
Called and LVM for patient and let her know to call the clinic to schedule an appointment with Agustina

## 2020-09-23 ENCOUNTER — HOSPITAL ENCOUNTER (OUTPATIENT)
Dept: WOUND CARE | Facility: CLINIC | Age: 45
End: 2020-09-23
Attending: PHYSICIAN ASSISTANT
Payer: MEDICAID

## 2020-09-23 DIAGNOSIS — L89.324 PRESSURE INJURY OF LEFT ISCHIUM, STAGE 4 (H): ICD-10-CM

## 2020-09-23 PROCEDURE — 99441 ZZC PHYSICIAN TELEPHONE EVALUATION 5-10 MIN: CPT | Performed by: PHYSICIAN ASSISTANT

## 2020-09-23 RX ORDER — FLUOXETINE 40 MG/1
CAPSULE ORAL
COMMUNITY
Start: 2020-09-16 | End: 2024-06-05

## 2020-09-23 RX ORDER — LIDOCAINE 50 MG/G
OINTMENT TOPICAL
COMMUNITY
Start: 2020-08-31

## 2020-09-23 RX ORDER — PREGABALIN 100 MG/1
CAPSULE ORAL
COMMUNITY
Start: 2020-05-20 | End: 2023-02-14

## 2020-09-23 RX ORDER — CELECOXIB 100 MG/1
CAPSULE ORAL
COMMUNITY
Start: 2020-09-09 | End: 2023-02-14

## 2020-09-23 NOTE — DISCHARGE INSTRUCTIONS
[facility]  [address] [address]     Call us at [phone] if you have any questions about your wounds, have redness  or swelling around your wound, have a fever of 101 or greater or if you have any other  problems or concerns. We answer the phone Monday through Friday 8 am to 4 pm,  please leave a message as we check the voicemail frequently throughout the day.    Marsha Mcneill 1975    [facility] fax [phone]    Medications/supplements to aid in healin. 1 packet of Amadeo Supplement into your favorite beverage TWICE a day  2. Vitamin D3 5,000 iu per day.     Wound Dressing Change : left ischial tuberosity  Gently cleanse with saline or wound cleanser. Do not probe into wound depth. Dry well. May use Lidocaine gel around wound edges. Cover with dry dressing such as gauze and tape or adhesive foam. Change daily    Bethany Moya PA-C, 2020    Wound Clinic follow up in one month

## 2020-09-23 NOTE — PROGRESS NOTES
"The patient has been notified of following:     \"This telephone visit will be conducted via a call between you and your physician/provider. We have found that certain health care needs can be provided without the need for a physical exam.  This service lets us provide the care you need with a short phone conversation.  If a prescription is necessary we can send it directly to your pharmacy.  If lab work is needed we can place an order for that and you can then stop by our lab to have the test done at a later time.    If during the course of the call the physician/provider feels a telephone visit is not appropriate, you will not be charged for this service.\"     Patient has given verbal consent for Telephone visit?  Yes    [facility] [place]    HISTORY OF PRESENT ILLNESS:   Marsha Mcneill is a 44 year old female with MS who presents today via telephone for a left IT ulcer. 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  This has been primarily cared for through the [facility] system. Per patient and her nursing staff the wound has been healing in well. History of osteomyelitis seen on MRI 9/12/19. Underwent debridement 9/18/19  By Dr. Vázquez which included bone debridement and evacuation of an abscess. Cultures + for MRSA. Completed IV vanco x 6 wks.      Her main complaint is significant pain that radiates from the wound. She is quite miserable from the pain and notes it with any repositioning of her body or with bandage changes. Follows with PMR for systemic pain medication.      Visited with Dr. Cedeno 3/12/20, her impression that pain may be related to inflammation or irritation of the sacral plexus, would be open to OR debridement to see if this would help mitigate inflammatory component, however feels that any definitive closure procedure could cause more nerve damage and does not feel this is indcated    MRI 3/12/20 - negative for osteomyelitis or other pathology    09/23/20: Wound nearly closed today. " Unfortunately pain still a problem. Working with other clinic on pain, recently added lidocaine and celebrex.     CURRENT/PREVIOUS DRESSING: Silvadene    COMPRESSION/OFFLOADING: on bedrest on group 2 mattress    REVIEW OF SYSTEMS:  CONSTITUTIONAL: Denies fevers or acute illness, denies recent weight loss   ENDOCRINE: Denies diabetes    PAST MEDICAL HISTORY:  has a past medical history of Anxiety, Chronic pain, Depression, History of DVT (deep vein thrombosis), Ileostomy status (H), Insomnia, MRSA (methicillin resistant staph aureus) culture positive, Multiple scleroses, Neurological disorders, Pressure sore, and Urinary tract infection.    SOCIAL HISTORY: residing in [facility] ([facility] in [facility])  TOBACCO STATUS:  reports that she has never smoked. She has never used smokeless tobacco.    MEDICATIONS:   Current Outpatient Medications   Medication     acetaminophen (TYLENOL) 500 MG tablet     baclofen (GABLOFEN) 87310 MCG/20ML injection     baclofen (LIORESAL) 10 MG tablet     calcium carbonate (OS-LILIBETH 600 MG [place]. CA) 600 MG tablet     calcium carbonate (TUMS) 500 MG chewable tablet     celecoxib (CELEBREX) 100 MG capsule     cholecalciferol (VITAMIN D3) 5000 units (125 mcg) capsule     ciprofloxacin (CIPRO) 500 MG tablet     doxepin (SINEQUAN) 25 MG capsule     FLUoxetine (PROZAC) 20 MG capsule     FLUoxetine (PROZAC) 40 MG capsule     gentamicin (GARAMYCIN) 40 MG/ML injection     ibuprofen (ADVIL/MOTRIN) 600 MG tablet     levonorgestrel (MIRENA) 20 MCG/24HR IUD     lidocaine (XYLOCAINE) 5 % external ointment     loratadine (CLARITIN) 10 MG tablet     medroxyPROGESTERone (DEPO-PROVERA) 150 MG/ML injection     Melatonin 3 MG SUBL     morphine (MS CONTIN) 15 MG CR tablet     NEW MED     NONFORMULARY     nystatin (MYCOSTATIN) 385028 UNIT/GM external cream     oxybutynin ER (DITROPAN XL) 15 MG 24 hr tablet     oxyCODONE (ROXICODONE) 5 MG tablet     pregabalin (LYRICA) 100 MG capsule     pregabalin (LYRICA) 150 MG  capsule     silver sulfADIAZINE (SILVADENE) 1 % external cream     SM NASAL DECONGESTANT PE 10 MG TABS     sodium chloride 0.9%, bottle, 0.9 % irrigation     traZODone (DESYREL) 100 MG tablet     Current Facility-Administered Medications   Medication     Botulinum Toxin Type A (BOTOX) 200 units injection 200 Units     RELEVANT IMAGING: MRI reviewed in chart    ASSESSMENT:   1. Stage 4 pressure ulcer of L IT  2. Pain of left buttocks, consistent with neuropathic pain    PLAN/DISCUSSION:   1. Wound care plan: dry cover dressing only, no packing    FOLLOW-UP: e-visit in 1 month  DURATION OF CALL: 5-10mins  VINCENT HAMPTON PA-C

## 2020-10-16 ENCOUNTER — HOSPITAL ENCOUNTER (OUTPATIENT)
Dept: WOUND CARE | Facility: CLINIC | Age: 45
End: 2020-10-16
Attending: PHYSICIAN ASSISTANT
Payer: MEDICAID

## 2020-10-16 ENCOUNTER — TELEPHONE (OUTPATIENT)
Dept: ANESTHESIOLOGY | Facility: CLINIC | Age: 45
End: 2020-10-16

## 2020-10-16 DIAGNOSIS — L89.324 PRESSURE INJURY OF LEFT ISCHIUM, STAGE 4 (H): ICD-10-CM

## 2020-10-16 PROCEDURE — 99441 PR PHYSICIAN TELEPHONE EVALUATION 5-10 MIN: CPT | Performed by: PHYSICIAN ASSISTANT

## 2020-10-16 RX ORDER — DIAZEPAM 5 MG
TABLET ORAL
COMMUNITY
Start: 2020-09-30 | End: 2023-02-14

## 2020-10-16 RX ORDER — LACTASE 3000 UNIT
TABLET ORAL
COMMUNITY
Start: 2020-10-13 | End: 2023-02-14

## 2020-10-16 RX ORDER — GABAPENTIN 300 MG/1
CAPSULE ORAL
COMMUNITY
Start: 2019-11-29 | End: 2023-02-14

## 2020-10-16 NOTE — TELEPHONE ENCOUNTER
[facility] Call Center    Phone Message    May a detailed message be left on voicemail: yes     Reason for Call: Other: pt requesting a call back in order to move her care forward--provider had told pt that once her wound healed, provider could inject something so that pt would not feel the sciatic nerve. Please call pt back. Thank you.     Action Taken: Message routed to:  [facility]  Pain Clinic    Travel Screening: Not Applicable

## 2020-10-16 NOTE — PROGRESS NOTES
[facility]    HISTORY OF PRESENT ILLNESS:   Marsha Mcneill is a 44 year old female with MS who presents today via telephone for a left IT ulcer. 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  This has been primarily cared for through the [facility] system. Per patient and her nursing staff the wound has been healing in well. History of osteomyelitis seen on MRI 9/12/19. Underwent debridement 9/18/19  By Dr. Vázquez which included bone debridement and evacuation of an abscess. Cultures + for MRSA. Completed IV vanco x 6 wks.      Her main complaint is significant pain that radiates from the wound. She is quite miserable from the pain and notes it with any repositioning of her body or with bandage changes. Follows with PMR for systemic pain medication.      Visited with Dr. Cedeno 3/12/20, her impression that pain may be related to inflammation or irritation of the sacral plexus, would be open to OR debridement to see if this would help mitigate inflammatory component, however feels that any definitive closure procedure could cause more nerve damage and does not feel this is indcated    MRI 3/12/20 - negative for osteomyelitis or other pathology    09/23/20: Wound nearly closed. Unfortunately pain still a problem. Working with other clinic on pain, recently added lidocaine and celebrex.   10/16/20: Wound has closed since last visit. Still having pain. Inquires whether she could be candidate for nerve block now that wound has healed.     CURRENT/PREVIOUS DRESSING: dry gauze    COMPRESSION/OFFLOADING: on bedrest on group 2 mattress    REVIEW OF SYSTEMS:  CONSTITUTIONAL: Denies fevers or acute illness, denies recent weight loss   ENDOCRINE: Denies diabetes    PAST MEDICAL HISTORY:  has a past medical history of Anxiety, Chronic pain, Depression, History of DVT (deep vein thrombosis), Ileostomy status (H), Insomnia, MRSA (methicillin resistant staph aureus) culture positive, Multiple scleroses, Neurological  disorders, Pressure sore, and Urinary tract infection.    SOCIAL HISTORY: residing in [facility] ([facility]  TOBACCO STATUS:  reports that she has never smoked. She has never used smokeless tobacco.    MEDICATIONS:   Current Outpatient Medications   Medication     lidocaine (XYLOCAINE) 2 % external gel     acetaminophen (TYLENOL) 500 MG tablet     baclofen (GABLOFEN) 33725 MCG/20ML injection     baclofen (LIORESAL) 10 MG tablet     calcium carbonate (OS-LILIBETH 600 MG [place]. [place]) 600 MG tablet     calcium carbonate (TUMS) 500 MG chewable tablet     calcium carbonate 600 mg-vitamin D 400 units (CALTRATE) 600-400 MG-UNIT per tablet     celecoxib (CELEBREX) 100 MG capsule     cholecalciferol (VITAMIN D3) 5000 units (125 mcg) capsule     ciprofloxacin (CIPRO) 500 MG tablet     diazepam (VALIUM) 5 MG tablet     doxepin (SINEQUAN) 25 MG capsule     FLUoxetine (PROZAC) 20 MG capsule     FLUoxetine (PROZAC) 40 MG capsule     gabapentin (NEURONTIN) 300 MG capsule     gentamicin (GARAMYCIN) 40 MG/ML injection     ibuprofen (ADVIL/MOTRIN) 600 MG tablet     LACTASE ENZYME 3000 units tablet     levonorgestrel (MIRENA) 20 MCG/24HR IUD     lidocaine (XYLOCAINE) 5 % external ointment     loratadine (CLARITIN) 10 MG tablet     medroxyPROGESTERone (DEPO-PROVERA) 150 MG/ML injection     Melatonin 3 MG SUBL     morphine (MS CONTIN) 15 MG CR tablet     NEW MED     NONFORMULARY     nystatin (MYCOSTATIN) 416752 UNIT/GM external cream     oxybutynin ER (DITROPAN XL) 15 MG 24 hr tablet     oxyCODONE (ROXICODONE) 5 MG tablet     pregabalin (LYRICA) 100 MG capsule     pregabalin (LYRICA) 150 MG capsule     silver sulfADIAZINE (SILVADENE) 1 % external cream     SM NASAL DECONGESTANT PE 10 MG TABS     sodium chloride 0.9%, bottle, 0.9 % irrigation     traZODone (DESYREL) 100 MG tablet     Current Facility-Administered Medications   Medication     Botulinum Toxin Type A (BOTOX) 200 units injection 200 Units     RELEVANT IMAGING: MRI  "reviewed in chart    ASSESSMENT:   1. Stage 4 pressure ulcer of L IT  2. Pain of left buttocks, consistent with neuropathic pain    PLAN/DISCUSSION:   1. Discontinue dressings  2. Follow-up with pain clinic regarding further treatments    FOLLOW-UP: PRN  DURATION OF CALL: 5-10mins  The patient has been notified of following:     \"This telephone visit will be conducted via a call between you and your physician/provider. We have found that certain health care needs can be provided without the need for a physical exam.  This service lets us provide the care you need with a short phone conversation.  If a prescription is necessary we can send it directly to your pharmacy.  If lab work is needed we can place an order for that and you can then stop by our lab to have the test done at a later time.    If during the course of the call the physician/provider feels a telephone visit is not appropriate, you will not be charged for this service.\"     Patient has given verbal consent for Telephone visit?  Yes    VINCENT HAMPTON PA-C    "

## 2020-10-16 NOTE — PROGRESS NOTES
[facility] [place] Nurse Note    Subject: Marsha Mcneill presents for telephone visit with Bethany Moya PA-C  for wound check. Patients wound is healed.   Pt will follow up with Anesthesia for pain management      Exam:  There were no vitals taken for this visit.       Plan: Patient will return to the clinic on as needed basis.    October 16, 2020

## 2020-10-16 NOTE — DISCHARGE INSTRUCTIONS
[facility] [place]  [address] [facility] [address] [place] [address]  Appointment Phone [phone]   Marsha Mcneill      1975  Your wound is Healed!! Dressings are no longer required.               Pain management with  Dr Higuera # [phone]    Reducing a Patient s Risk for Pressure Injuries  There is no single preventive for pressure injuries. Give priority to pressure relief. Reduce other risks to help maintain a healthy flow of nutrients to the patient s skin.  Relieve pressure  Relieving pressure is the single most important factor in preventing and treating pressure injuries.  You can relieve pressure and restore the skin s blood supply by repositioning the patient and using special devices. Post a schedule to remind you to reposition the patient -- from side to back or from stomach to side. Make minor position changes even more frequently. Specially designed pillows, beds, or mattresses can also help reduce pressure.  In a bed    Use pillows under calves to elevate the legs from above the knees to the ankles.    Alter the angles of arms and legs.  In a wheelchair    Be sure the wheelchair is the proper size for the patient to give optimal support. For example, if the seat it too narrow, it will put extra pressure on the hips.    Cushion the back and buttocks with pillows or wheelchair cushions, and pad the footrest.    Use a special wheelchair cushion that is designed to distribute weight evenly and relieve pressure points that is evaluated yearly.    Have special cushions tested and adjusted at the proper times as recommended by the . This will allow the pressure relief to remain effective.   Manage moisture  Keep skin clean and lubricated, but free of excess moisture. Put the patient on a regular toilet schedule. Use incontinent devices, if appropriate. Consult with a doctor about using diarrhea medicines:    Use talc-free powders or barrier creams.    Pat skin dry  after bathing.    Lubricate skin with lotion.  Reduce shear and friction  Prevent skin breakdown by reducing friction and shear. Patients are less likely to slide down in bed if they re supported by pillows and the head of the bed isn t raised too high. During bed or wheelchair transfers, lift--don t drag--the patient. If you can t do this alone, get help. And be sure to use assistive devices, whenever possible.  In a bed    Use draw-sheets or transfer boards to move patients.    Clean and smooth the bed surface.    Lift the head of the bed no more than 30 .    Raise the foot of the bed slightly.  In a wheelchair    Support the patient s back with a pillow.    Use a foot extension.      Bethany Moya PA-C, October 16, 2020    Wound Clinic follow up if new issues occur

## 2020-10-19 ENCOUNTER — TELEPHONE (OUTPATIENT)
Dept: UROLOGY | Facility: CLINIC | Age: 45
End: 2020-10-19

## 2020-10-19 NOTE — TELEPHONE ENCOUNTER
Left patient message to inform her that typically botox is done every 6 months and wouldn't be due until February 2021.  Occasionally Dr. Westbrook will do botox every 4 months in which could be done in December.    Radha Earl, RN, BSN  Care Coordinator- Reconstructive Urology

## 2020-10-19 NOTE — TELEPHONE ENCOUNTER
[facility] Call Center    Phone Message    May a detailed message be left on voicemail: no     Reason for Call: Other: Pt reports when she lies on her right side she is leaking out more. Her botox has helped since she last had it done in August but Pt says she's likely due for another botox injection. Pt wants to know if she has to do this as another procedure or just an outpatient visit. Please call Pt back to discuss.     Action Taken: Message routed to:  [facility]    Travel Screening: Not Applicable

## 2020-10-20 NOTE — TELEPHONE ENCOUNTER
I called and spoke with the pt and informed her that Dr. Higuear wants to see the pt in clinic before ordering a procedure for her.    I scheduled the pt for a follow up on 11/04/20 at 8am.    Pt stated verbal understanding and had no further questions or concerns.    Sunita Luna CMA

## 2020-11-04 ENCOUNTER — OFFICE VISIT (OUTPATIENT)
Dept: ANESTHESIOLOGY | Facility: CLINIC | Age: 45
End: 2020-11-04
Payer: MEDICAID

## 2020-11-04 VITALS — HEIGHT: 67 IN | RESPIRATION RATE: 16 BRPM | BODY MASS INDEX: 26.06 KG/M2 | WEIGHT: 166 LBS

## 2020-11-04 DIAGNOSIS — M25.559 ISCHIAL PAIN, UNSPECIFIED LATERALITY: Primary | ICD-10-CM

## 2020-11-04 PROCEDURE — 99213 OFFICE O/P EST LOW 20 MIN: CPT

## 2020-11-04 RX ORDER — HYDROCODONE BITARTRATE AND ACETAMINOPHEN 5; 325 MG/1; MG/1
1 TABLET ORAL EVERY 8 HOURS PRN
Qty: 90 TABLET | Refills: 0 | Status: SHIPPED | OUTPATIENT
Start: 2020-11-04 | End: 2020-11-04

## 2020-11-04 RX ORDER — HYDROCODONE BITARTRATE AND ACETAMINOPHEN 5; 325 MG/1; MG/1
1 TABLET ORAL EVERY 8 HOURS
Qty: 90 TABLET | Refills: 0 | Status: SHIPPED | OUTPATIENT
Start: 2020-11-04 | End: 2020-12-01

## 2020-11-04 ASSESSMENT — PAIN SCALES - GENERAL: PAINLEVEL: SEVERE PAIN (6)

## 2020-11-04 ASSESSMENT — MIFFLIN-ST. JEOR: SCORE: 1435.6

## 2020-11-04 NOTE — NURSING NOTE
AVS given and reviewed with pt.  Pt verbalized understanding and declined any questions.     LOUIS GalindoN, RN

## 2020-11-04 NOTE — LETTER
"11/4/2020       RE: Marsha Mcneill  [address] [facility] [address]     Dear Colleague,    Thank you for referring your patient, Marsha Mcneill, to the [facility] FOR COMPREH[facility] PAIN MANAGEMENT [place] at [facility]. Please see a copy of my visit note below.    Patient is a 46 y/o lady lady with a history of multiple sclerosis and osteomyelitis.  She states that she has had multiple sclerosis for approximately 20 years.  At the present time she complains of a pain near the ischial tuberosity and she presents today for follow-up after initial evaluation several months ago.  Her pain is stated to be 6 out of 10 in severity.  At the time of her initial visit the patient's wound was open and injections were not a possibility.  The patient noted that her pain was constant and daily and thus MS Contin was prescribed for the patient.  The patient notes and nursing home staff corroborate the fact that she is somewhat somnolent during the day and thus MS Contin is not of viable option.  She notes that she takes Lyrica 150 mg 3 times daily, MS Contin 15 mg in the morning, oxycodone 5 mg as needed.  She states that her pain is radiating and lancinating and \"like a fire.  She presents today for reevaluation and follow-up.  Current Outpatient Medications   Medication     acetaminophen (TYLENOL) 500 MG tablet     baclofen (GABLOFEN) 39911 MCG/20ML injection     baclofen (LIORESAL) 10 MG tablet     calcium carbonate (OS-LILIBETH 600 MG [place]. CA) 600 MG tablet     calcium carbonate (TUMS) 500 MG chewable tablet     calcium carbonate 600 mg-vitamin D 400 units (CALTRATE) 600-400 MG-UNIT per tablet     celecoxib (CELEBREX) 100 MG capsule     cholecalciferol (VITAMIN D3) 5000 units (125 mcg) capsule     ciprofloxacin (CIPRO) 500 MG tablet     diazepam (VALIUM) 5 MG tablet     doxepin (SINEQUAN) 25 MG capsule     FLUoxetine (PROZAC) 20 MG capsule     FLUoxetine (PROZAC) 40 MG capsule "     gabapentin (NEURONTIN) 300 MG capsule     gentamicin (GARAMYCIN) 40 MG/ML injection     ibuprofen (ADVIL/MOTRIN) 600 MG tablet     LACTASE ENZYME 3000 units tablet     lidocaine (XYLOCAINE) 2 % external gel     lidocaine (XYLOCAINE) 5 % external ointment     loratadine (CLARITIN) 10 MG tablet     medroxyPROGESTERone (DEPO-PROVERA) 150 MG/ML injection     Melatonin 3 MG SUBL     morphine (MS CONTIN) 15 MG CR tablet     NEW MED     NONFORMULARY     nystatin (MYCOSTATIN) 250537 UNIT/GM external cream     oxybutynin ER (DITROPAN XL) 15 MG 24 hr tablet     oxyCODONE (ROXICODONE) 5 MG tablet     pregabalin (LYRICA) 100 MG capsule     pregabalin (LYRICA) 150 MG capsule     silver sulfADIAZINE (SILVADENE) 1 % external cream     SM NASAL DECONGESTANT PE 10 MG TABS     sodium chloride 0.9%, bottle, 0.9 % irrigation     traZODone (DESYREL) 100 MG tablet     HYDROcodone-acetaminophen (NORCO) 5-325 MG tablet     levonorgestrel (MIRENA) 20 MCG/24HR IUD     Current Facility-Administered Medications   Medication     Botulinum Toxin Type A (BOTOX) 200 units injection 200 Units     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     Macrobid [Nitrofurantoin Anhydrous] Nausea and Vomiting       Past Medical History:   Diagnosis Date     Anxiety      Chronic pain      Depression      History of DVT (deep vein thrombosis)      Ileostomy status (H)      Insomnia      MRSA (methicillin resistant staph aureus) culture positive      Multiple scleroses      Neurological disorders      Pressure sore      Urinary tract infection      Past Surgical History:   Procedure Laterality Date     BLADDER AUGMENTATION  12/7/2012    Procedure: BLADDER AUGMENTATION;  APPENDECTOMY, BLADDER AUGMENTATION, HUMBERTO BLADDER STOMA;   Surgeon: Errol Westbrook MD;  Location: [facility] OR     ILEOSTOMY  2018     INSERT PUMP BACLOFEN  2009    2 revisions     IRRIGATION AND DEBRIDEMENT DECUBITUS WOUND, COMBINED  09/2019     LAPAROTOMY EXPLORATORY  12/20/2012    Procedure: LAPAROTOMY EXPLORATORY;  Removal of Inter Abdominal Drain;  Surgeon: Errol Westbrook MD;  Location: [facility] OR     LAPAROTOMY EXPLORATORY      ileostomy fro diversion of obstipation     MITROFANOFF PROCEDURE (APPENDIX CONDUIT)  12/7/2012    Procedure: MITROFANOFF PROCEDURE (APPENDIX CONDUIT);;  Surgeon: Errol Westbrook MD;  Location: [facility] OR     SALPINGECTOMY Bilateral 2/26/2020    Procedure: Laparotomy, removal of pelvic mass, both fallopian tubes,;  Surgeon: Tommie Barron MD;  Location: [facility] OR     Family History   Problem Relation Age of Onset     Cancer Mother         non hodgkin's lymphoma      Diabetes Mother      Diabetes Maternal Grandfather      Cardiovascular Maternal Grandfather      Cardiovascular Father      Social History     Socioeconomic History     Marital status:      Spouse name: Not on file     Number of children: Not on file     Years of education: Not on file     Highest education level: Not on file   Occupational History     Not on file   Social Needs     Financial resource strain: Not on file     Food insecurity     Worry: Not on file     Inability: Not on file     Transportation needs     Medical: Not on file     Non-medical: Not on file   Tobacco Use     Smoking status: Never Smoker     Smokeless tobacco: Never Used   Substance and Sexual Activity     Alcohol use: No     Drug use: No     Sexual activity: Not on file   Lifestyle     Physical activity     Days per week: Not on file     Minutes per session: Not on file     Stress: Not on file   Relationships     Social connections     Talks on phone: Not on file     Gets together: Not on file     Attends Jainism service: Not on file     Active member of club or organization: Not on  file     Attends meetings of clubs or organizations: Not on file     Relationship status: Not on file     Intimate partner violence     Fear of current or ex partner: Not on file     Emotionally abused: Not on file     Physically abused: Not on file     Forced sexual activity: Not on file   Other Topics Concern     Parent/sibling w/ CABG, MI or angioplasty before 65F 55M? Not Asked   Social History Narrative     Not on file      ROS: 10 point ROS neg other than the symptoms noted above in the HPI.  Physical Exam  Vitals signs and nursing note reviewed. Exam conducted with a chaperone present.   Musculoskeletal:         General: Deformity present. No swelling, tenderness or signs of injury.      Right lower leg: No edema.      Left lower leg: No edema.      Comments: Patient has tenderness over the ischium     A/P:Patient is a 44 y/o lady with multiple medical problems who presents for f/u.  Her ischial tuberosity pain has continued, however the opioids have caused some sedation.  I recommend discontinuing the MSContin and oxycodone.  I recommend a short acting opioid (hydrocodone q8h).  No injections are warranted at this time.  Patient will f/u in 3 months    Again, thank you for allowing me to participate in the care of your patient.      Sincerely,    Juan Jose Higuera MD

## 2020-11-04 NOTE — PATIENT INSTRUCTIONS
Medications:    Stop MS contin and PRN oxycodone.     Start hydrocodone 5-325mg every 8 hours.     *For refills of opioid medications - You MUST call (Or MyChart) the clinic DIRECTLY and at least 7 days before you run out of your medication.    *Please provide the clinic with a minium of 1 week notice, on all prescription refills.       Recommended Follow up:      12/1/2020 at 3pm via video visit        Please call [phone], option #1 to schedule your clinic appointment if you don't already have an appointment scheduled.        To speak with a nurse, schedule/reschedule/cancel a clinic appointment, or request a medication refill call: [phone], option #1.    You can also reach us by [employer]: [email or web address]

## 2020-12-01 ENCOUNTER — VIRTUAL VISIT (OUTPATIENT)
Dept: ANESTHESIOLOGY | Facility: CLINIC | Age: 45
End: 2020-12-01
Payer: MEDICAID

## 2020-12-01 DIAGNOSIS — M25.559 ISCHIAL PAIN, UNSPECIFIED LATERALITY: Primary | ICD-10-CM

## 2020-12-01 PROCEDURE — 99213 OFFICE O/P EST LOW 20 MIN: CPT | Mod: GT

## 2020-12-01 RX ORDER — HYDROCODONE BITARTRATE AND ACETAMINOPHEN 5; 325 MG/1; MG/1
1 TABLET ORAL EVERY 8 HOURS
Qty: 90 TABLET | Refills: 0 | Status: SHIPPED | OUTPATIENT
Start: 2020-12-01 | End: 2023-02-14

## 2020-12-01 ASSESSMENT — PAIN SCALES - GENERAL: PAINLEVEL: SEVERE PAIN (6)

## 2020-12-01 NOTE — LETTER
"12/1/2020       RE: Marsha Mcneill  [address] [facility] [address]     Dear Colleague,    Thank you for referring your patient, Marsha Mcneill, to the [facility] FOR COMPREHENSIVE PAIN MANAGEMENT [place] at [facility]. Please see a copy of my visit note below.    Marsha Mcneill is a 45 year old female who is being evaluated via a billable video visit.      The patient has been notified of following:     \"This video visit will be conducted via a call between you and your physician/provider. We have found that certain health care needs can be provided without the need for an in-person physical exam.  This service lets us provide the care you need with a video conversation.  If a prescription is necessary we can send it directly to your pharmacy.  If lab work is needed we can place an order for that and you can then stop by our lab to have the test done at a later time.    Video visits are billed at different rates depending on your insurance coverage.  Please reach out to your insurance provider with any questions.    If during the course of the call the physician/provider feels a video visit is not appropriate, you will not be charged for this service.\"    Patient has given verbal consent for Video visit? Yes  How would you like to obtain your AVS? MyChart  If you are dropped from the video visit, the video invite should be resent to: Send to e-mail at: [email or web address]  Will anyone else be joining your video visit? Janei Luna CMA      Marsha Mcneill is a 45 year old female who presents for medication refill.  Patient has received opioid management for ischial tuberosity pain.    The pain score is Severe Pain (6).  The pain is described as dull ache.  There have not been interventional techniques performed since the last visit. The patient  is currently managed with  Medications  Hydrocodone 5/325 mg q8h prn      The  was checked.  The last " refill date was 11/30/2020  The patient reports an improvement in function.  The patient denies nausea.  The patient denies vomiting.  The patient denies sedation  There are not  signs of diversion.  Pill count was not performed.  UDS will not be performed today.  A controlled substance agreement (CSA) has been entered.    The patient presents for re-evaluation and medication refills.  Current Outpatient Medications   Medication     acetaminophen (TYLENOL) 500 MG tablet     baclofen (GABLOFEN) 17713 MCG/20ML injection     baclofen (LIORESAL) 10 MG tablet     calcium carbonate (OS-LILIBETH 600 MG [place]. [place]) 600 MG tablet     calcium carbonate (TUMS) 500 MG chewable tablet     calcium carbonate 600 mg-vitamin D 400 units (CALTRATE) 600-400 MG-UNIT per tablet     celecoxib (CELEBREX) 100 MG capsule     cholecalciferol (VITAMIN D3) 5000 units (125 mcg) capsule     ciprofloxacin (CIPRO) 500 MG tablet     diazepam (VALIUM) 5 MG tablet     doxepin (SINEQUAN) 25 MG capsule     FLUoxetine (PROZAC) 20 MG capsule     FLUoxetine (PROZAC) 40 MG capsule     gabapentin (NEURONTIN) 300 MG capsule     gentamicin (GARAMYCIN) 40 MG/ML injection     HYDROcodone-acetaminophen (NORCO) 5-325 MG tablet     ibuprofen (ADVIL/MOTRIN) 600 MG tablet     LACTASE ENZYME 3000 units tablet     lidocaine (XYLOCAINE) 2 % external gel     lidocaine (XYLOCAINE) 5 % external ointment     loratadine (CLARITIN) 10 MG tablet     medroxyPROGESTERone (DEPO-PROVERA) 150 MG/ML injection     Melatonin 3 MG SUBL     morphine (MS CONTIN) 15 MG CR tablet     NEW MED     NONFORMULARY     nystatin (MYCOSTATIN) 541955 UNIT/GM external cream     oxybutynin ER (DITROPAN XL) 15 MG 24 hr tablet     oxyCODONE (ROXICODONE) 5 MG tablet     pregabalin (LYRICA) 100 MG capsule     pregabalin (LYRICA) 150 MG capsule     silver sulfADIAZINE (SILVADENE) 1 % external cream     SM NASAL DECONGESTANT PE 10 MG TABS     sodium chloride 0.9%, bottle, 0.9 % irrigation     traZODone (DESYREL)  100 MG tablet     levonorgestrel (MIRENA) 20 MCG/24HR IUD     Current Facility-Administered Medications   Medication     Botulinum Toxin Type A (BOTOX) 200 units injection 200 Units     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     Macrobid [Nitrofurantoin Anhydrous] Nausea and Vomiting     Past Medical History:   Diagnosis Date     Anxiety      Chronic pain      Depression      History of DVT (deep vein thrombosis)      Ileostomy status (H)      Insomnia      MRSA (methicillin resistant staph aureus) culture positive      Multiple scleroses      Neurological disorders      Pressure sore      Urinary tract infection      Past Surgical History:   Procedure Laterality Date     BLADDER AUGMENTATION  12/7/2012    Procedure: BLADDER AUGMENTATION;  APPENDECTOMY, BLADDER AUGMENTATION, HUMBERTO BLADDER STOMA;  Surgeon: Errol Westrbook MD;  Location: [facility] OR     ILEOSTOMY  2018     INSERT PUMP BACLOFEN  2009    2 revisions     IRRIGATION AND DEBRIDEMENT DECUBITUS WOUND, COMBINED  09/2019     LAPAROTOMY EXPLORATORY  12/20/2012    Procedure: LAPAROTOMY EXPLORATORY;  Removal of Inter Abdominal Drain;  Surgeon: Errol Westbrook MD;  Location: [facility] OR     LAPAROTOMY EXPLORATORY      ileostomy fro diversion of obstipation     MITROFANOFF PROCEDURE (APPENDIX CONDUIT)  12/7/2012    Procedure: MITROFANOFF PROCEDURE (APPENDIX CONDUIT);;  Surgeon: Errol Westbrook MD;  Location: [facility] OR     SALPINGECTOMY Bilateral 2/26/2020    Procedure: Laparotomy, removal of pelvic mass, both fallopian tubes,;  Surgeon: Tommie Barron MD;  Location: [facility] OR     Family History   Problem Relation Age of Onset     Cancer Mother         non hodgkin's lymphoma      Diabetes Mother      Diabetes Maternal  Grandfather      Cardiovascular Maternal Grandfather      Cardiovascular Father      Social History     Socioeconomic History     Marital status:      Spouse name: Not on file     Number of children: Not on file     Years of education: Not on file     Highest education level: Not on file   Occupational History     Not on file   Social Needs     Financial resource strain: Not on file     Food insecurity     Worry: Not on file     Inability: Not on file     Transportation needs     Medical: Not on file     Non-medical: Not on file   Tobacco Use     Smoking status: Never Smoker     Smokeless tobacco: Never Used   Substance and Sexual Activity     Alcohol use: No     Drug use: No     Sexual activity: Not on file   Lifestyle     Physical activity     Days per week: Not on file     Minutes per session: Not on file     Stress: Not on file   Relationships     Social connections     Talks on phone: Not on file     Gets together: Not on file     Attends Baptism service: Not on file     Active member of club or organization: Not on file     Attends meetings of clubs or organizations: Not on file     Relationship status: Not on file     Intimate partner violence     Fear of current or ex partner: Not on file     Emotionally abused: Not on file     Physically abused: Not on file     Forced sexual activity: Not on file   Other Topics Concern     Parent/sibling w/ CABG, MI or angioplasty before 65F 55M? Not Asked   Social History Narrative     Not on file      ROS: 10 point ROS neg other than the symptoms noted above in the HPI.  Physical Exam is not performed during this virtual visit  A/P: Marsha Mcneill is a 45 year old with a history of ischial tuberosity pain.  The patient has been managed with the assistance of opioid analgesics.  This is appropriate at this time.  There will not be changes to the medication regimen.  Medications will be renewed today.  Prescriptions written today are:  Hydrocodone 5/325 mg q8h  prn    Medication refills will be available on today.  Patient will return to clinic in 3 months for re-evaluation and follow up.    Again, thank you for allowing me to participate in the care of your patient.      Sincerely,    Juan Jose Higuera MD

## 2020-12-01 NOTE — PROGRESS NOTES
"Marsha Mcneill is a 45 year old female who is being evaluated via a billable video visit.      The patient has been notified of following:     \"This video visit will be conducted via a call between you and your physician/provider. We have found that certain health care needs can be provided without the need for an in-person physical exam.  This service lets us provide the care you need with a video conversation.  If a prescription is necessary we can send it directly to your pharmacy.  If lab work is needed we can place an order for that and you can then stop by our lab to have the test done at a later time.    Video visits are billed at different rates depending on your insurance coverage.  Please reach out to your insurance provider with any questions.    If during the course of the call the physician/provider feels a video visit is not appropriate, you will not be charged for this service.\"    Patient has given verbal consent for Video visit? Yes  How would you like to obtain your AVS? MyChart  If you are dropped from the video visit, the video invite should be resent to: Send to e-mail at: [email or web address]  Will anyone else be joining your video visit? Janie Luna, ESTHELA      Marsha Mcneill is a 45 year old female who presents for medication refill.  Patient has received opioid management for ischial tuberosity pain.    The pain score is Severe Pain (6).  The pain is described as dull ache.  There have not been interventional techniques performed since the last visit. The patient  is currently managed with  Medications  Hydrocodone 5/325 mg q8h prn      The  was checked.  The last refill date was 11/30/2020  The patient reports an improvement in function.  The patient denies nausea.  The patient denies vomiting.  The patient denies sedation  There are not  signs of diversion.  Pill count was not performed.  UDS will not be performed today.  A controlled substance agreement (CSA) has been " entered.    The patient presents for re-evaluation and medication refills.  Current Outpatient Medications   Medication     acetaminophen (TYLENOL) 500 MG tablet     baclofen (GABLOFEN) 99304 MCG/20ML injection     baclofen (LIORESAL) 10 MG tablet     calcium carbonate (OS-LILIBETH 600 MG [place]. [place]) 600 MG tablet     calcium carbonate (TUMS) 500 MG chewable tablet     calcium carbonate 600 mg-vitamin D 400 units (CALTRATE) 600-400 MG-UNIT per tablet     celecoxib (CELEBREX) 100 MG capsule     cholecalciferol (VITAMIN D3) 5000 units (125 mcg) capsule     ciprofloxacin (CIPRO) 500 MG tablet     diazepam (VALIUM) 5 MG tablet     doxepin (SINEQUAN) 25 MG capsule     FLUoxetine (PROZAC) 20 MG capsule     FLUoxetine (PROZAC) 40 MG capsule     gabapentin (NEURONTIN) 300 MG capsule     gentamicin (GARAMYCIN) 40 MG/ML injection     HYDROcodone-acetaminophen (NORCO) 5-325 MG tablet     ibuprofen (ADVIL/MOTRIN) 600 MG tablet     LACTASE ENZYME 3000 units tablet     lidocaine (XYLOCAINE) 2 % external gel     lidocaine (XYLOCAINE) 5 % external ointment     loratadine (CLARITIN) 10 MG tablet     medroxyPROGESTERone (DEPO-PROVERA) 150 MG/ML injection     Melatonin 3 MG SUBL     morphine (MS CONTIN) 15 MG CR tablet     NEW MED     NONFORMULARY     nystatin (MYCOSTATIN) 347517 UNIT/GM external cream     oxybutynin ER (DITROPAN XL) 15 MG 24 hr tablet     oxyCODONE (ROXICODONE) 5 MG tablet     pregabalin (LYRICA) 100 MG capsule     pregabalin (LYRICA) 150 MG capsule     silver sulfADIAZINE (SILVADENE) 1 % external cream     SM NASAL DECONGESTANT PE 10 MG TABS     sodium chloride 0.9%, bottle, 0.9 % irrigation     traZODone (DESYREL) 100 MG tablet     levonorgestrel (MIRENA) 20 MCG/24HR IUD     Current Facility-Administered Medications   Medication     Botulinum Toxin Type A (BOTOX) 200 units injection 200 Units     Allergies   Allergen Reactions     Augmentin Unknown, GI Disturbance and Nausea and Vomiting     vomiting       Mushroom GI  Disturbance     vomiting     Povidone Iodine Rash     Amoxicillin Nausea and Vomiting     Aspirin Other (See Comments)     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     Macrobid [Nitrofurantoin Anhydrous] Nausea and Vomiting     Past Medical History:   Diagnosis Date     Anxiety      Chronic pain      Depression      History of DVT (deep vein thrombosis)      Ileostomy status (H)      Insomnia      MRSA (methicillin resistant staph aureus) culture positive      Multiple scleroses      Neurological disorders      Pressure sore      Urinary tract infection      Past Surgical History:   Procedure Laterality Date     BLADDER AUGMENTATION  12/7/2012    Procedure: BLADDER AUGMENTATION;  APPENDECTOMY, BLADDER AUGMENTATION, HUMBERTO BLADDER STOMA;  Surgeon: Errol Westbrook MD;  Location: [facility]     ILEOSTOMY  2018     INSERT PUMP BACLOFEN  2009    2 revisions     IRRIGATION AND DEBRIDEMENT DECUBITUS WOUND, COMBINED  09/2019     LAPAROTOMY EXPLORATORY  12/20/2012    Procedure: LAPAROTOMY EXPLORATORY;  Removal of Inter Abdominal Drain;  Surgeon: Errol Westbrook MD;  Location: [facility]     LAPAROTOMY EXPLORATORY      ileostomy fro diversion of obstipation     MITROFANOFF PROCEDURE (APPENDIX CONDUIT)  12/7/2012    Procedure: MITROFANOFF PROCEDURE (APPENDIX CONDUIT);;  Surgeon: Errol Westbrook MD;  Location: [facility]     SALPINGECTOMY Bilateral 2/26/2020    Procedure: Laparotomy, removal of pelvic mass, both fallopian tubes,;  Surgeon: Tommie Barron MD;  Location: [facility]     Family History   Problem Relation Age of Onset     Cancer Mother         non hodgkin's lymphoma      Diabetes Mother      Diabetes Maternal Grandfather      Cardiovascular Maternal Grandfather      Cardiovascular Father      Social History     Socioeconomic History     Marital status:      Spouse name: Not on file     Number of children: Not on file     Years of education: Not on file     Highest education level: Not on file   Occupational  History     Not on file   Social Needs     Financial resource strain: Not on file     Food insecurity     Worry: Not on file     Inability: Not on file     Transportation needs     Medical: Not on file     Non-medical: Not on file   Tobacco Use     Smoking status: Never Smoker     Smokeless tobacco: Never Used   Substance and Sexual Activity     Alcohol use: No     Drug use: No     Sexual activity: Not on file   Lifestyle     Physical activity     Days per week: Not on file     Minutes per session: Not on file     Stress: Not on file   Relationships     Social connections     Talks on phone: Not on file     Gets together: Not on file     Attends Mandaen service: Not on file     Active member of club or organization: Not on file     Attends meetings of clubs or organizations: Not on file     Relationship status: Not on file     Intimate partner violence     Fear of current or ex partner: Not on file     Emotionally abused: Not on file     Physically abused: Not on file     Forced sexual activity: Not on file   Other Topics Concern     Parent/sibling w/ CABG, MI or angioplasty before 65F 55M? Not Asked   Social History Narrative     Not on file      ROS: 10 point ROS neg other than the symptoms noted above in the HPI.  Physical Exam is not performed during this virtual visit  A/P: Marsha Mcneill is a 45 year old with a history of ischial tuberosity pain.  The patient has been managed with the assistance of opioid analgesics.  This is appropriate at this time.  There will not be changes to the medication regimen.  Medications will be renewed today.  Prescriptions written today are:  Hydrocodone 5/325 mg q8h prn    Medication refills will be available on today.  Patient will return to clinic in 3 months for re-evaluation and follow up.

## 2020-12-01 NOTE — PATIENT INSTRUCTIONS
Medications:    Continue medications as prescribed.     For refills of opioid medications - You MUST call (Or MyChart) the clinic DIRECTLY and at least 7 days before you run out of your medication.    Recommended Follow up:      3 months with Dr. Higuera.        Please call [phone], option #1 to schedule your clinic appointment if you don't already have an appointment scheduled.      To speak with a nurse, schedule/reschedule/cancel a clinic appointment, or request a medication refill call: [phone], option #1.    You can also reach us by [employer]: [email or web address]

## 2020-12-07 NOTE — PROGRESS NOTES
"Patient is a 44 y/o lady lady with a history of multiple sclerosis and osteomyelitis.  She states that she has had multiple sclerosis for approximately 20 years.  At the present time she complains of a pain near the ischial tuberosity and she presents today for follow-up after initial evaluation several months ago.  Her pain is stated to be 6 out of 10 in severity.  At the time of her initial visit the patient's wound was open and injections were not a possibility.  The patient noted that her pain was constant and daily and thus MS Contin was prescribed for the patient.  The patient notes and nursing home staff corroborate the fact that she is somewhat somnolent during the day and thus MS Contin is not of viable option.  She notes that she takes Lyrica 150 mg 3 times daily, MS Contin 15 mg in the morning, oxycodone 5 mg as needed.  She states that her pain is radiating and lancinating and \"like a fire.  She presents today for reevaluation and follow-up.  Current Outpatient Medications   Medication     acetaminophen (TYLENOL) 500 MG tablet     baclofen (GABLOFEN) 14729 MCG/20ML injection     baclofen (LIORESAL) 10 MG tablet     calcium carbonate (OS-LILIBETH 600 MG [place]. [place]) 600 MG tablet     calcium carbonate (TUMS) 500 MG chewable tablet     calcium carbonate 600 mg-vitamin D 400 units (CALTRATE) 600-400 MG-UNIT per tablet     celecoxib (CELEBREX) 100 MG capsule     cholecalciferol (VITAMIN D3) 5000 units (125 mcg) capsule     ciprofloxacin (CIPRO) 500 MG tablet     diazepam (VALIUM) 5 MG tablet     doxepin (SINEQUAN) 25 MG capsule     FLUoxetine (PROZAC) 20 MG capsule     FLUoxetine (PROZAC) 40 MG capsule     gabapentin (NEURONTIN) 300 MG capsule     gentamicin (GARAMYCIN) 40 MG/ML injection     ibuprofen (ADVIL/MOTRIN) 600 MG tablet     LACTASE ENZYME 3000 units tablet     lidocaine (XYLOCAINE) 2 % external gel     lidocaine (XYLOCAINE) 5 % external ointment     loratadine (CLARITIN) 10 MG tablet     " medroxyPROGESTERone (DEPO-PROVERA) 150 MG/ML injection     Melatonin 3 MG SUBL     morphine (MS CONTIN) 15 MG CR tablet     NEW MED     NONFORMULARY     nystatin (MYCOSTATIN) 211874 UNIT/GM external cream     oxybutynin ER (DITROPAN XL) 15 MG 24 hr tablet     oxyCODONE (ROXICODONE) 5 MG tablet     pregabalin (LYRICA) 100 MG capsule     pregabalin (LYRICA) 150 MG capsule     silver sulfADIAZINE (SILVADENE) 1 % external cream     SM NASAL DECONGESTANT PE 10 MG TABS     sodium chloride 0.9%, bottle, 0.9 % irrigation     traZODone (DESYREL) 100 MG tablet     HYDROcodone-acetaminophen (NORCO) 5-325 MG tablet     levonorgestrel (MIRENA) 20 MCG/24HR IUD     Current Facility-Administered Medications   Medication     Botulinum Toxin Type A (BOTOX) 200 units injection 200 Units     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     Macrobid [Nitrofurantoin Anhydrous] Nausea and Vomiting       Past Medical History:   Diagnosis Date     Anxiety      Chronic pain      Depression      History of DVT (deep vein thrombosis)      Ileostomy status (H)      Insomnia      MRSA (methicillin resistant staph aureus) culture positive      Multiple scleroses      Neurological disorders      Pressure sore      Urinary tract infection      Past Surgical History:   Procedure Laterality Date     BLADDER AUGMENTATION  12/7/2012    Procedure: BLADDER AUGMENTATION;  APPENDECTOMY, BLADDER AUGMENTATION, HUMBERTO BLADDER STOMA;  Surgeon: Errol Westbrook MD;  Location: [facility]     ILEOSTOMY  2018     INSERT PUMP BACLOFEN  2009    2 revisions     IRRIGATION AND DEBRIDEMENT DECUBITUS WOUND, COMBINED  09/2019     LAPAROTOMY EXPLORATORY  12/20/2012    Procedure: LAPAROTOMY EXPLORATORY;  Removal of Inter Abdominal Drain;  Surgeon:  Errol Westbrook MD;  Location: [facility]     LAPAROTOMY EXPLORATORY      ileostomy fro diversion of obstipation     MITROFANOFF PROCEDURE (APPENDIX CONDUIT)  12/7/2012    Procedure: MITROFANOFF PROCEDURE (APPENDIX CONDUIT);;  Surgeon: Errol Westbrook MD;  [facility]     SALPINGECTOMY Bilateral 2/26/2020    Procedure: Laparotomy, removal of pelvic mass, both fallopian tubes,;  Surgeon: Tommie Barron MD;  Location: [facility]     Family History   Problem Relation Age of Onset     Cancer Mother         non hodgkin's lymphoma      Diabetes Mother      Diabetes Maternal Grandfather      Cardiovascular Maternal Grandfather      Cardiovascular Father      Social History     Socioeconomic History     Marital status:      Spouse name: Not on file     Number of children: Not on file     Years of education: Not on file     Highest education level: Not on file   Occupational History     Not on file   Social Needs     Financial resource strain: Not on file     Food insecurity     Worry: Not on file     Inability: Not on file     Transportation needs     Medical: Not on file     Non-medical: Not on file   Tobacco Use     Smoking status: Never Smoker     Smokeless tobacco: Never Used   Substance and Sexual Activity     Alcohol use: No     Drug use: No     Sexual activity: Not on file   Lifestyle     Physical activity     Days per week: Not on file     Minutes per session: Not on file     Stress: Not on file   Relationships     Social connections     Talks on phone: Not on file     Gets together: Not on file     Attends Zoroastrian service: Not on file     Active member of club or organization: Not on file     Attends meetings of clubs or organizations: Not on file     Relationship status: Not on file     Intimate partner violence     Fear of current or ex partner: Not on file     Emotionally abused: Not on file     Physically abused: Not on file     Forced sexual activity: Not on file   Other Topics  Concern     Parent/sibling w/ CABG, MI or angioplasty before 65F 55M? Not Asked   Social History Narrative     Not on file      ROS: 10 point ROS neg other than the symptoms noted above in the HPI.  Physical Exam  Vitals signs and nursing note reviewed. Exam conducted with a chaperone present.   Musculoskeletal:         General: Deformity present. No swelling, tenderness or signs of injury.      Right lower leg: No edema.      Left lower leg: No edema.      Comments: Patient has tenderness over the ischium     A/P:Patient is a 44 y/o lady with multiple medical problems who presents for f/u.  Her ischial tuberosity pain has continued, however the opioids have caused some sedation.  I recommend discontinuing the MSContin and oxycodone.  I recommend a short acting opioid (hydrocodone q8h).  No injections are warranted at this time.  Patient will f/u in 3 months

## 2020-12-14 ENCOUNTER — HEALTH MAINTENANCE LETTER (OUTPATIENT)
Age: 45
End: 2020-12-14

## 2020-12-17 ENCOUNTER — TELEPHONE (OUTPATIENT)
Dept: UROLOGY | Facility: CLINIC | Age: 45
End: 2020-12-17

## 2020-12-17 DIAGNOSIS — N31.9 NEUROGENIC BLADDER: Primary | ICD-10-CM

## 2020-12-21 ENCOUNTER — TELEPHONE (OUTPATIENT)
Dept: UROLOGY | Facility: CLINIC | Age: 45
End: 2020-12-21

## 2020-12-29 DIAGNOSIS — Z11.59 ENCOUNTER FOR SCREENING FOR OTHER VIRAL DISEASES: ICD-10-CM

## 2020-12-30 NOTE — TELEPHONE ENCOUNTER
FUTURE VISIT INFORMATION      FUTURE VISIT INFORMATION:    Date: 1/12/21    Time: 12:20pm    Location: [facility]  REFERRAL INFORMATION:    Referring provider:  self    Referring providers clinic:  N/A    Reason for visit/diagnosis  Eye exam Per Pt, said has MS and experiences Double Vision     RECORDS REQUESTED FROM:       No recs to collect

## 2021-01-04 ENCOUNTER — TELEPHONE (OUTPATIENT)
Dept: UROLOGY | Facility: CLINIC | Age: 46
End: 2021-01-04

## 2021-01-04 DIAGNOSIS — Z01.818 PREOP GENERAL PHYSICAL EXAM: Primary | ICD-10-CM

## 2021-01-04 NOTE — TELEPHONE ENCOUNTER
[facility] Call Center    Phone Message    May a detailed message be left on voicemail: yes     Reason for Call: Medication Refill Request    Has the patient contacted the pharmacy for the refill? Yes   Name of medication being requested: 480 mg gentamicin in 1 Liter 0.9 Normal Saline  Provider who prescribed the medication:   [id number]  Date medication is needed: asap         Action Taken: Message routed to:  [facility] ([facility]): uro    Travel Screening: Not Applicable

## 2021-01-05 ENCOUNTER — TRANSFERRED RECORDS (OUTPATIENT)
Dept: HEALTH INFORMATION MANAGEMENT | Facility: CLINIC | Age: 46
End: 2021-01-05

## 2021-01-07 ENCOUNTER — ANESTHESIA EVENT (OUTPATIENT)
Dept: SURGERY | Facility: AMBULATORY SURGERY CENTER | Age: 46
End: 2021-01-07

## 2021-01-08 ENCOUNTER — ANESTHESIA (OUTPATIENT)
Dept: SURGERY | Facility: AMBULATORY SURGERY CENTER | Age: 46
End: 2021-01-08

## 2021-01-08 ENCOUNTER — HOSPITAL ENCOUNTER (OUTPATIENT)
Facility: AMBULATORY SURGERY CENTER | Age: 46
Discharge: HOME | End: 2021-01-08
Attending: UROLOGY | Admitting: UROLOGY
Payer: MEDICAID

## 2021-01-08 VITALS
HEIGHT: 67 IN | SYSTOLIC BLOOD PRESSURE: 107 MMHG | WEIGHT: 170 LBS | BODY MASS INDEX: 26.68 KG/M2 | TEMPERATURE: 98.4 F | HEART RATE: 66 BPM | RESPIRATION RATE: 14 BRPM | DIASTOLIC BLOOD PRESSURE: 63 MMHG | OXYGEN SATURATION: 98 %

## 2021-01-08 DIAGNOSIS — N31.9 NEUROGENIC BLADDER: ICD-10-CM

## 2021-01-08 LAB
HCG UR QL: NEGATIVE
INTERNAL QC OK POCT: YES

## 2021-01-08 PROCEDURE — 52287 CYSTOSCOPY CHEMODENERVATION: CPT

## 2021-01-08 RX ORDER — LIDOCAINE 40 MG/G
CREAM TOPICAL
Status: DISCONTINUED | OUTPATIENT
Start: 2021-01-08 | End: 2021-01-09 | Stop reason: HOSPADM

## 2021-01-08 RX ORDER — LIDOCAINE 40 MG/G
CREAM TOPICAL
Status: DISCONTINUED | OUTPATIENT
Start: 2021-01-08 | End: 2021-01-08 | Stop reason: HOSPADM

## 2021-01-08 RX ORDER — FENTANYL CITRATE 50 UG/ML
25-50 INJECTION, SOLUTION INTRAMUSCULAR; INTRAVENOUS
Status: DISCONTINUED | OUTPATIENT
Start: 2021-01-08 | End: 2021-01-09 | Stop reason: HOSPADM

## 2021-01-08 RX ORDER — SODIUM CHLORIDE, SODIUM LACTATE, POTASSIUM CHLORIDE, CALCIUM CHLORIDE 600; 310; 30; 20 MG/100ML; MG/100ML; MG/100ML; MG/100ML
INJECTION, SOLUTION INTRAVENOUS CONTINUOUS
Status: DISCONTINUED | OUTPATIENT
Start: 2021-01-08 | End: 2021-01-09 | Stop reason: HOSPADM

## 2021-01-08 RX ORDER — SODIUM CHLORIDE, SODIUM LACTATE, POTASSIUM CHLORIDE, CALCIUM CHLORIDE 600; 310; 30; 20 MG/100ML; MG/100ML; MG/100ML; MG/100ML
INJECTION, SOLUTION INTRAVENOUS CONTINUOUS
Status: DISCONTINUED | OUTPATIENT
Start: 2021-01-08 | End: 2021-01-08 | Stop reason: HOSPADM

## 2021-01-08 RX ORDER — HYDRALAZINE HYDROCHLORIDE 20 MG/ML
2.5-5 INJECTION INTRAMUSCULAR; INTRAVENOUS EVERY 10 MIN PRN
Status: DISCONTINUED | OUTPATIENT
Start: 2021-01-08 | End: 2021-01-08 | Stop reason: HOSPADM

## 2021-01-08 RX ORDER — NALOXONE HYDROCHLORIDE 0.4 MG/ML
0.2 INJECTION, SOLUTION INTRAMUSCULAR; INTRAVENOUS; SUBCUTANEOUS
Status: DISCONTINUED | OUTPATIENT
Start: 2021-01-08 | End: 2021-01-09 | Stop reason: HOSPADM

## 2021-01-08 RX ORDER — ACETAMINOPHEN 325 MG/1
975 TABLET ORAL ONCE
Status: DISCONTINUED | OUTPATIENT
Start: 2021-01-08 | End: 2021-01-08 | Stop reason: HOSPADM

## 2021-01-08 RX ORDER — GENTAMICIN SULFATE 80 MG/100ML
INJECTION, SOLUTION INTRAVENOUS PRN
Status: DISCONTINUED | OUTPATIENT
Start: 2021-01-08 | End: 2021-01-08

## 2021-01-08 RX ORDER — HEPARIN SODIUM,PORCINE 10 UNIT/ML
5-10 VIAL (ML) INTRAVENOUS EVERY 24 HOURS
Status: DISCONTINUED | OUTPATIENT
Start: 2021-01-08 | End: 2021-01-09 | Stop reason: HOSPADM

## 2021-01-08 RX ORDER — ACYCLOVIR 200 MG/1
CAPSULE ORAL PRN
Status: DISCONTINUED | OUTPATIENT
Start: 2021-01-08 | End: 2021-01-08 | Stop reason: HOSPADM

## 2021-01-08 RX ORDER — ONDANSETRON 4 MG/1
4 TABLET, ORALLY DISINTEGRATING ORAL EVERY 30 MIN PRN
Status: DISCONTINUED | OUTPATIENT
Start: 2021-01-08 | End: 2021-01-09 | Stop reason: HOSPADM

## 2021-01-08 RX ORDER — OXYCODONE HYDROCHLORIDE 5 MG/1
5 TABLET ORAL EVERY 4 HOURS PRN
Status: DISCONTINUED | OUTPATIENT
Start: 2021-01-08 | End: 2021-01-09 | Stop reason: HOSPADM

## 2021-01-08 RX ORDER — METOPROLOL TARTRATE 1 MG/ML
1-2 INJECTION, SOLUTION INTRAVENOUS EVERY 5 MIN PRN
Status: DISCONTINUED | OUTPATIENT
Start: 2021-01-08 | End: 2021-01-08 | Stop reason: HOSPADM

## 2021-01-08 RX ORDER — HYDROMORPHONE HYDROCHLORIDE 1 MG/ML
.3-.5 INJECTION, SOLUTION INTRAMUSCULAR; INTRAVENOUS; SUBCUTANEOUS EVERY 10 MIN PRN
Status: DISCONTINUED | OUTPATIENT
Start: 2021-01-08 | End: 2021-01-09 | Stop reason: HOSPADM

## 2021-01-08 RX ORDER — NALOXONE HYDROCHLORIDE 0.4 MG/ML
0.4 INJECTION, SOLUTION INTRAMUSCULAR; INTRAVENOUS; SUBCUTANEOUS
Status: DISCONTINUED | OUTPATIENT
Start: 2021-01-08 | End: 2021-01-09 | Stop reason: HOSPADM

## 2021-01-08 RX ORDER — PROPOFOL 10 MG/ML
INJECTION, EMULSION INTRAVENOUS CONTINUOUS PRN
Status: DISCONTINUED | OUTPATIENT
Start: 2021-01-08 | End: 2021-01-08

## 2021-01-08 RX ORDER — ONDANSETRON 2 MG/ML
4 INJECTION INTRAMUSCULAR; INTRAVENOUS EVERY 30 MIN PRN
Status: DISCONTINUED | OUTPATIENT
Start: 2021-01-08 | End: 2021-01-09 | Stop reason: HOSPADM

## 2021-01-08 RX ORDER — HEPARIN SODIUM (PORCINE) LOCK FLUSH IV SOLN 100 UNIT/ML 100 UNIT/ML
5 SOLUTION INTRAVENOUS
Status: DISCONTINUED | OUTPATIENT
Start: 2021-01-08 | End: 2021-01-09 | Stop reason: HOSPADM

## 2021-01-08 RX ORDER — GENTAMICIN SULFATE 80 MG/100ML
80 INJECTION, SOLUTION INTRAVENOUS ONCE
Status: DISCONTINUED | OUTPATIENT
Start: 2021-01-08 | End: 2021-01-09 | Stop reason: HOSPADM

## 2021-01-08 RX ORDER — FENTANYL CITRATE 50 UG/ML
25-50 INJECTION, SOLUTION INTRAMUSCULAR; INTRAVENOUS
Status: DISCONTINUED | OUTPATIENT
Start: 2021-01-08 | End: 2021-01-08 | Stop reason: HOSPADM

## 2021-01-08 RX ORDER — HEPARIN SODIUM,PORCINE 10 UNIT/ML
5-10 VIAL (ML) INTRAVENOUS
Status: DISCONTINUED | OUTPATIENT
Start: 2021-01-08 | End: 2021-01-09 | Stop reason: HOSPADM

## 2021-01-08 RX ORDER — MEPERIDINE HYDROCHLORIDE 25 MG/ML
12.5 INJECTION INTRAMUSCULAR; INTRAVENOUS; SUBCUTANEOUS
Status: DISCONTINUED | OUTPATIENT
Start: 2021-01-08 | End: 2021-01-09 | Stop reason: HOSPADM

## 2021-01-08 RX ADMIN — SODIUM CHLORIDE, SODIUM LACTATE, POTASSIUM CHLORIDE, CALCIUM CHLORIDE: 600; 310; 30; 20 INJECTION, SOLUTION INTRAVENOUS at 10:51

## 2021-01-08 RX ADMIN — Medication 100 MCG: at 11:24

## 2021-01-08 RX ADMIN — GENTAMICIN SULFATE 80 MG: 80 INJECTION, SOLUTION INTRAVENOUS at 11:10

## 2021-01-08 RX ADMIN — HEPARIN SODIUM (PORCINE) LOCK FLUSH IV SOLN 100 UNIT/ML 5 ML: 100 SOLUTION at 12:19

## 2021-01-08 RX ADMIN — PROPOFOL 40 MCG/KG/MIN: 10 INJECTION, EMULSION INTRAVENOUS at 11:03

## 2021-01-08 ASSESSMENT — LIFESTYLE VARIABLES: TOBACCO_USE: 0

## 2021-01-08 ASSESSMENT — ENCOUNTER SYMPTOMS: SEIZURES: 0

## 2021-01-08 ASSESSMENT — MIFFLIN-ST. JEOR: SCORE: 1453.74

## 2021-01-08 NOTE — ANESTHESIA PREPROCEDURE EVALUATION
"Anesthesia Pre-Procedure Evaluation    Patient: Marsha Mcneill   MRN:     [id number] Gender:   female   Age:    45 year old :      1975        Preoperative Diagnosis: Neurogenic bladder [N31.9]   Procedure(s):  CYSTOSCOPY, WITH BOTULINUM TOXIN INJECTION     LABS:  CBC:   Lab Results   Component Value Date    WBC 9.5 2020    WBC 4.7 2020    HGB 11.2 (L) 2020    HGB 11.6 (L) 2020    HCT 37.7 2020    HCT 43.2 2020     2020     2020     BMP:   Lab Results   Component Value Date     2020     2020    POTASSIUM 3.8 2020    POTASSIUM 3.9 2020    CHLORIDE 110 (H) 2020    CHLORIDE 109 2020    CO2 23 2020    CO2 22 2020    BUN 11 2020    BUN 14 2020    CR 0.36 (L) 2020    CR 0.46 (L) 2020     (H) 2020    GLC 74 2020     COAGS:   Lab Results   Component Value Date    INR 1.2 (H) 2018     POC:   Lab Results   Component Value Date    BGM 86 2020    HCG Negative 2021     OTHER:   Lab Results   Component Value Date    A1C 4.8 2019    LILIBETH 8.8 2020    PHOS 4.0 2012    MAG 2.0 2012    ALBUMIN 2.5 (L) 2012    PROTTOTAL 5.3 (L) 2012    ALT 23 2019    AST 11 (L) 2019    ALKPHOS 127 2012    BILITOTAL <0.1 (L) 2012    SED 7 2020        Preop Vitals    BP Readings from Last 3 Encounters:   21 107/63   20 106/65   20 120/69    Pulse Readings from Last 3 Encounters:   21 66   20 65   20 78      Resp Readings from Last 3 Encounters:   21 14   20 16   20 19    SpO2 Readings from Last 3 Encounters:   21 98%   20 98%   20 97%      Temp Readings from Last 1 Encounters:   21 36.9  C (98.4  F) (Temporal)    Ht Readings from Last 1 Encounters:   21 1.702 m (5' 7\")      Wt Readings from Last 1 Encounters:   21 " "77.1 kg (170 lb)    Estimated body mass index is 26.63 kg/m  as calculated from the following:    Height as of this encounter: 1.702 m (5' 7\").    Weight as of this encounter: 77.1 kg (170 lb).     LDA:  Port A Cath Single 01/08/21 Right Chest wall (Active)   Access Date 01/08/21 01/08/21 0942   Access Attempts 1 01/08/21 0942   Gauge 20 gauge;3/4 inch 01/08/21 0942   Site Assessment WDL 01/08/21 1208   Line Status Infusing 01/08/21 1208   Extravasation? No 01/08/21 0942   Dressing Intervention Transparent 01/08/21 1208   Number of days: 0       Ileostomy (Active)   Stomal Appliance Intact 01/08/21 0900   Output (ml) 100 ml 01/08/21 0900   Number of days:        Suprapubic Catheter Non-latex 20 fr (Active)   Number of days: 2954       Suprapubic Catheter Non-latex 14 fr (Active)   Number of days: 2954        Past Medical History:   Diagnosis Date     Anxiety      Chronic pain      Depression      History of DVT (deep vein thrombosis)      Ileostomy status (H)      Insomnia      MRSA (methicillin resistant staph aureus) culture positive      Multiple scleroses      Neurological disorders      Pressure sore      Urinary tract infection       Past Surgical History:   Procedure Laterality Date     BLADDER AUGMENTATION  12/7/2012    Procedure: BLADDER AUGMENTATION;  APPENDECTOMY, BLADDER AUGMENTATION, HUMBERTO BLADDER STOMA;  Surgeon: Errol Westbrook MD;  Location: [facility]     ILEOSTOMY  2018     INSERT PUMP BACLOFEN  2009    2 revisions     IRRIGATION AND DEBRIDEMENT DECUBITUS WOUND, COMBINED  09/2019     LAPAROTOMY EXPLORATORY  12/20/2012    Procedure: LAPAROTOMY EXPLORATORY;  Removal of Inter Abdominal Drain;  Surgeon: Errol Westbrook MD;  Location: [facility]     LAPAROTOMY EXPLORATORY      ileostomy fro diversion of obstipation     MITROFANOFF PROCEDURE (APPENDIX CONDUIT)  12/7/2012    Procedure: MITROFANOFF PROCEDURE (APPENDIX CONDUIT);;  Surgeon: Errol Westbrook MD;  [facility]: [facility]     SALPINGECTOMY " Bilateral 2/26/2020    Procedure: Laparotomy, removal of pelvic mass, both fallopian tubes,;  Surgeon: Tommie Barron MD;  Location: [facility]      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     Macrobid [Nitrofurantoin Anhydrous] Nausea and Vomiting        Anesthesia Evaluation     . Pt has had prior anesthetic. Type: General    No history of anesthetic complications    slow to wake       ROS/MED HX    ENT/Pulmonary:  - neg pulmonary ROS    (-) tobacco use   Neurologic:     (+)Multiple Sclerosis (patient has baclofen pump) limitations: wheelchair bound, has slight movement in left hand, other neuro (multiple sclerosis)    (-) seizures, CVA and TIA   Cardiovascular: Comment: Patient has lower extremity edema.     (+) ----. : . . . :. . Previous cardiac testing date:results:date: results:ECG reviewed date:5/6/19 results:NSR date: results:          METS/Exercise Tolerance:  1 - Eating, dressing   Hematologic:     (+) History of blood clots (patient had subclavian thrombosis in 2012) pt is not anticoagulated, -     (-) anemia and History of Transfusion   Musculoskeletal:   (+)  other musculoskeletal- pressure sore/osteomyelitis      GI/Hepatic: Comment: S/p ileostomy 2018 for neurogenic bowel       (-) GERD   Renal/Genitourinary: Comment: S/p ileocystoplasty with Erik 2012    Neurogenic bladder    Ovarian mass    (+) Other Renal/ Genitourinary, history of recurrent UTIs      Endo:  - neg endo ROS       Psychiatric:     (+) psychiatric history anxiety, other (comment) and depression (insomnia)      Infectious Disease:   (+) MRSA,       Malignancy:         Other: Comment: Rheumatoid arthritis    (+) H/O Chronic Pain,H/O chronic opiod use , other significant disability Wheelchair bound                        PHYSICAL EXAM:   Mental Status/Neuro: A/A/O; Age Appropriate   Airway: Facies: Feasible  Mallampati: II  Mouth/Opening: Full  TM distance: > 6 cm  Neck ROM: Not Assessed   Respiratory: Auscultation: CTAB     Resp. Rate: Normal     Resp. Effort: Normal      CV: Rhythm: Regular  Edema: LLE; RLE  Pulses: Normal   Comments: Unable to assess neck ROM as patient has no muscle control secondary to MS.                      Assessment:   ASA SCORE: 3    H&P: History and physical reviewed and following examination; no interval change.   Smoking Status:  Non-Smoker/Unknown   NPO Status: NPO Appropriate     Plan:   Anes. Type:  MAC   Pre-Medication: Acetaminophen   Induction:  N/a   Airway: Native Airway   Access/Monitoring: PIV   Maintenance: N/a     Postop Plan:   Postop Pain: None  Postop Sedation/Airway: Not planned  Disposition: Outpatient     PONV Management:   Adult Risk Factors: Female, Non-Smoker     CONSENT: Direct conversation   Plan and risks discussed with: Patient   Blood Products: N/a                   Kristopher Garcia DO

## 2021-01-08 NOTE — OP NOTE
OPERATIVE REPORT    DATE OF SURGERY: 1/8/2021    PREOPERATIVE DIAGNOSIS:  Neurogenic bladder    POSTOPERATIVE DIAGNOSIS: Same    PROCEDURES PERFORMED:   1. Cystourethroscopy   2. Injection of botulinum toxin A 200units in 20cc sterile saline  3. Cystoscopic evaluation of Erik Channel    STAFF SURGEON: Errol Westbrook MD, available for entire case.     RESIDENT(S):  Ken Hook MD    ANESTHESIA: Monitor Anesthesia Care    ESTIMATED BLOOD LOSS: <10 mL.     DRAINS: None    SIGNIFICANT FINDINGS:  1. Residual urine of 400cc with catheterization at start of procedure. Significant mucus in urine  2. Erik channel inserted into augment    OPERATIVE INDICATIONS:   Mrasha Mcneill is a 45 year old female with a history of neurogenic bladder with recurrent UTIs and leakage of urine from her Erik channel and native urethra. The patient was counseled on the alternatives, risks, and benefits and elected to proceed with the above stated procedures.    DESCRIPTION OF PROCEDURE:   After verification of informed consent was obtained, the patient was brought to the operating room, and moved to the operating table. After adequate anesthesia was induced, the patient was repositioned in the lithotomy position and prepped and draped in the usual sterile fashion. A formal timeout was performed to confirm the correct patient, procedure and operative site. Appropriate pre-operative antibiotics were administered    First, the bladder was drained via a catheter placed through the native urethra. Approximately 400cc of mucous filled urine was drained from the bladder.  A 21-Haitian Jacobs injection cystoscope was placed into the bladder.  The bladder mucosa appeared to be normal without stones, tumors or diverticulum on 360 degree inspection. The ileal augment was also without abnormality on 360 degree inspection. The ureteral orifices were in the normal orthotopic position bilaterally.  We mixed 2 vials of 100 U botulinum toxin A into 20  mL of injectable saline for a total of (10 units/mL).  We performed a template injection into the native bladder, with careful attention to avoid injection into the augment. All injections were placed deep to the mucosa into the detrusor muscle.  We then emptied her bladder to re-inspect our injection sites and found that there was a minimal amount of blood.     A flexible cystoscope was then advanced through the Erik channel. We noted some additional mucous, but no suspicious lesions in the Erik channel. 360 degree inspection of the insertion point of the Erik channel noted it was inserted entirely into the augmented portion of the bladder. The cystoscope was removed and her bladder was then emptied again.The procedure was then concluded. The patient was returned to supine position and transported to recovery in stable condition. She tolerated the procedure well.     POSTOP PLAN:  1. Discharge to home  2. Renal ultrasound within one month  3. Video visit in 1 month to review ultrasound, symptom check

## 2021-01-08 NOTE — ANESTHESIA POSTPROCEDURE EVALUATION
Anesthesia POST Procedure Evaluation    Patient: Marsha Mcneill   MRN:     [id number] Gender:   female   Age:    45 year old :      1975        Preoperative Diagnosis: Neurogenic bladder [N31.9]   Procedure(s):  CYSTOSCOPY, WITH BOTULINUM TOXIN INJECTION   Postop Comments: No value filed.     Anesthesia Type: MAC       Disposition: Outpatient   Postop Pain Control: Uneventful            Sign Out: Well controlled pain   PONV: No   Neuro/Psych: Uneventful            Sign Out: Acceptable/Baseline neuro status   Airway/Respiratory: Uneventful            Sign Out: Acceptable/Baseline resp. status   CV/Hemodynamics: Uneventful            Sign Out: Acceptable CV status   Other NRE: NONE   DID A NON-ROUTINE EVENT OCCUR? No         Last Anesthesia Record Vitals:  [facility] VITALS  2021 1118 - 2021 1218      2021             Resp Rate (set):  10          Last PACU Vitals:  No vitals data found for the desired time range.        Electronically Signed By: Kristopher Garcia DO, 2021, 12:45 PM

## 2021-01-08 NOTE — ANESTHESIA CARE TRANSFER NOTE
Patient: Marsha Mcneill    Procedure(s):  CYSTOSCOPY, WITH BOTULINUM TOXIN INJECTION    Diagnosis: Neurogenic bladder [N31.9]  Diagnosis Additional Information: No value filed.    Anesthesia Type:   MAC     Note:  Airway :Room Air  Patient transferred to:Phase II  Comments: 108/69 97%  99.0 -60-16  Handoff Report: Identifed the Patient, Identified the Reponsible Provider, Reviewed the pertinent medical history, Discussed the surgical course, Reviewed Intra-OP anesthesia mangement and issues during anesthesia, Set expectations for post-procedure period and Allowed opportunity for questions and acknowledgement of understanding      Vitals: (Last set prior to Anesthesia Care Transfer)    [facility] VITALS  1/8/2021 1118 - 1/8/2021 1155      1/8/2021             Resp Rate (set):  10                Electronically Signed By: OLIVIA Paul CRNA  January 8, 2021  11:55 AM

## 2021-01-08 NOTE — DISCHARGE INSTRUCTIONS
"[facility]  Home Care Following Anesthesia  For 24 hours after surgery:  1. Get plenty of rest.  A responsible adult must stay with you for at least 24 hours after you leave the surgery center.  2. Do not drive or use heavy equipment.  If you have weakness or tingling, don't drive or use heavy equipment until this feeling goes away.   3. Do not drink alcohol.   4. Avoid strenuous or risky activities.  Ask for help when climbing stairs.  5. You may feel lightheaded.  IF so, sit for a few minutes before standing.  Have someone help you get up.   6. If you have nausea (feel sick to your stomach): Drink only clear liquids such as apple juice, ginger ale, broth or 7-Up.  Rest may also help.  Be sure to drink enough fluids.  Move to a regular diet as you feel able.   7. You may have a slight fever.  Call the doctor if your fever is over 100 F (37.7 C) (taken under the tongue) or lasts longer than 24 hours.  8. You may have a dry mouth, a sore throat, muscle aches or trouble sleeping. These should go away after 24 hours.  9. Do not make important or legal decisions.        Today you received a Marcaine or bupivacaine block to numb the nerves near your surgery site.  This is a block using local anesthetic or \"numbing\" medication injected around the nerves to anesthetize or \"numb\" the area supplied by those nerves.  This block is injected into the muscle layer near your surgical site.  The medication may numb the location where you had surgery for 6-18 hours, but may last up to 24 hours.  If your surgical site is an arm or leg you should be careful with your affected limb, since it is possible to injure your limb without being aware of it due to the numbing.  Until full feeling returns, you should guard against bumping or hitting your limb, and avoid extreme hot or cold temperatures on the skin.  As the block wears off, the feeling will return as a tingling or prickly sensation near your " surgical site.  You will experience more discomfort from your incision as the feeling returns.  You may want to take a pain pill (a narcotic or Tylenol if this was prescribed by your surgeon) when you start to experience mild pain before the pain beccomes more severe.  If your pain medications do not control your pain you should notifiy your surgeon.    Tips for taking pain medications  To get the best pain relief possible, remember these points:    Take pain medications as directed, before pain becomes severe.    Pain medication can upset your stomach: taking it with food may help.    Constipation is a common side effect of pain medication. Drink plenty of  fluids.    Eat foods high in fiber. Take a stool softener if recommended by your doctor or pharmacist.    Do not drink alcohol, drive or operate machinery while taking pain medications.    Ask about other ways to control pain, such as with heat, ice or relaxation.    Tylenol/Acetaminophen Consumption  To help encourage the safe use of acetaminophen, the makers of TYLENOL  have lowered the maximum daily dose for single-ingredient Extra Strength TYLENOL  (acetaminophen) products sold in the [place] from 8 pills per day (4,000 mg) to 6 pills per day (3,000 mg). The dosing interval has also changed from 2 pills every 4-6 hours to 2 pills every 6 hours.    If you feel your pain relief is insufficient, you may take Tylenol/Acetaminophen in addition to your narcotic pain medication.     Be careful not to exceed 3,000 mg of Tylenol/Acetaminophen in a 24 hour period from all sources.    If you are taking extra strength Tylenol/acetaminophen (500 mg), the maximum dose is 6 tablets in 24 hours.    If you are taking regular strength acetaminophen (325 mg), the maximum dose is 9 tablets in 24 hours.    Call a doctor for any of the followin. Signs of infection (fever, growing tenderness at the surgery site, a large amount of drainage or bleeding, severe pain, foul-smelling  drainage, redness, swelling).  2. It has been over 8 to 10 hours since surgery and you are still not able to urinate (pass water).  3. Headache for over 24 hours.  4. Numbness, tingling or weakness the day after surgery (if you had spinal anesthesia).  5. Signs of Covid-19 infection (temperature over 100 degrees, shortness of breath, cough, loss of taste/smell, generalized body aches, persistent headache, chills, sore throat, nausea/vomiting/diarrhea)  Your doctor is:  Dr. Errol Hernandez, Prostate and Urology: [phone]  Or dial [phone] and ask for the resident on call for:  Prostate Urology  For emergency care, call the:  [place]:  [phone] (TTY for hearing impaired: [phone])

## 2021-01-11 NOTE — TELEPHONE ENCOUNTER
FUTURE VISIT INFORMATION      FUTURE VISIT INFORMATION:    Date: 1/18/21    Time: 10:20am    Location: [facility]  REFERRAL INFORMATION:    Referring provider:  self    Referring providers clinic:  N/A    Reason for visit/diagnosis  Annual eye exam/ MS patient    RECORDS REQUESTED FROM:       No recs to collect

## 2021-01-12 ENCOUNTER — TELEPHONE (OUTPATIENT)
Dept: ANESTHESIOLOGY | Facility: CLINIC | Age: 46
End: 2021-01-12

## 2021-01-12 ENCOUNTER — PRE VISIT (OUTPATIENT)
Dept: OPHTHALMOLOGY | Facility: CLINIC | Age: 46
End: 2021-01-12

## 2021-01-12 NOTE — TELEPHONE ENCOUNTER
[facility] Call Center    Phone Message    May a detailed message be left on voicemail: yes     Reason for Call: Medication Refill Request    Has the patient contacted the pharmacy for the refill? Yes   Name of medication being requested: HYDROcodone-acetaminophen (NORCO) 5-325 MG tablet  Provider who prescribed the medication: Juan Jose Higuera MD  Pharmacy: [id number] - [place], [address]  Date medication is needed: asap   Please call pharmacy if this medication is no longer being prescribed thank you.      Action Taken: Message routed to:  [facility] ([facility]): pain    Travel Screening: Not Applicable

## 2021-01-13 ENCOUNTER — TELEPHONE (OUTPATIENT)
Dept: UROLOGY | Facility: CLINIC | Age: 46
End: 2021-01-13

## 2021-01-13 NOTE — TELEPHONE ENCOUNTER
[facility] Call Center    Phone Message    May a detailed message be left on voicemail: yes     Reason for Call: Other: Marsha calling to report that she does not remember most of what Dr. Westbrook has said to her prior to her Botox procedure regarding what may happen after the botox.  She remembers something about a possible issue with her bowels, but reports that she was so tired that she may have fallen asleep prior to the procedure.  Please call her back to discuss expectations after botox.     Action Taken: Message routed to:  [facility] ([facility]):  Urology    Travel Screening: Not Applicable

## 2021-01-13 NOTE — TELEPHONE ENCOUNTER
RNCC sent pt MyChart message to confirm that hydrocodone refill is needed at this time. Pt was reminded that, for future refills, she must contact the clinic directly at least 7 days before the refill is due.     LOUIS GalindoN, RN

## 2021-01-14 ENCOUNTER — TELEPHONE (OUTPATIENT)
Dept: UROLOGY | Facility: CLINIC | Age: 46
End: 2021-01-14

## 2021-01-14 DIAGNOSIS — N31.9 NEUROGENIC BLADDER: Primary | ICD-10-CM

## 2021-01-14 NOTE — TELEPHONE ENCOUNTER
Left patient message to call back to discuss.    Radha Earl, RN, BSN  Lead RN Care Coordinator- Reconstructive Urology

## 2021-01-14 NOTE — TELEPHONE ENCOUNTER
----- Message from Radha Earl RN sent at 1/11/2021 10:02 AM CST -----  Hi,    Please see below and schedule virtual visit with renal ultrasound prior    Radha Flores  ----- Message -----  From: Ken Hook MD  Sent: 1/8/2021  11:47 AM CST  To: FLO Arriaga Can Holly Odell please be scheduled for a video visit in 1 month with Dr. Westbrook to review her renal ultrasound and see how her symptoms are doing post-botox? I know there was already discussion about her having her ultrasound (which did not end up happening this morning) and it looks like she has an active order already. Please let me know if there is anything I need to do to get that ultrasound rescheduled. Thanks!  Jimenez,  Jim

## 2021-01-14 NOTE — TELEPHONE ENCOUNTER
Spoke with pt regarding scheduling 1 month f/u with MUNIRA prior with Dr. Hernandez. Scheduled appt. Pt requested orders be faxed to nursing home.     Faxed orders to [facility] [phone] on 1/14/21

## 2021-01-18 ENCOUNTER — PRE VISIT (OUTPATIENT)
Dept: OPHTHALMOLOGY | Facility: CLINIC | Age: 46
End: 2021-01-18

## 2021-01-19 ENCOUNTER — TELEPHONE (OUTPATIENT)
Dept: ANESTHESIOLOGY | Facility: CLINIC | Age: 46
End: 2021-01-19

## 2021-01-19 NOTE — TELEPHONE ENCOUNTER
[facility] Call Center    Phone Message    May a detailed message be left on voicemail: yes     Reason for Call: Medication Refill Request    Has the patient contacted the pharmacy for the refill? Yes   Name of medication being requested: HYDROcodone-acetaminophen (NORCO) 5-325 MG tablet     Provider who prescribed the medication: Juan Jose Higuera MD  Pharmacy: [id number] - [place], [address]  Date medication is needed: asap   The pharmacy would like a call from the care team to understand the order instructions to continue this order please review and follow up with the pharmacy thank you.       Action Taken: Message routed to:  [facility] pain    Travel Screening: Not Applicable

## 2021-01-20 ENCOUNTER — TELEPHONE (OUTPATIENT)
Dept: ANESTHESIOLOGY | Facility: CLINIC | Age: 46
End: 2021-01-20

## 2021-01-20 NOTE — TELEPHONE ENCOUNTER
[facility] Call Center    Phone Message    May a detailed message be left on voicemail: yes     Reason for Call: Other: Emili calling to see if Dr Higuera is ok with patient primary dr Cruzito Pardo prescribing the Norco? Please call to discuss further.     Action Taken: Message routed to:  [facility] Pain Clinic    Travel Screening: Not Applicable

## 2021-01-25 ENCOUNTER — HOSPITAL ENCOUNTER (OUTPATIENT)
Dept: MRI IMAGING | Facility: CLINIC | Age: 46
Discharge: HOME | End: 2021-01-25
Attending: PHYSICAL MEDICINE & REHABILITATION | Admitting: PHYSICAL MEDICINE & REHABILITATION
Payer: MEDICAID

## 2021-01-25 DIAGNOSIS — L89.324 DECUBITUS ULCER OF ISCHIAL AREA, LEFT, STAGE IV (H): ICD-10-CM

## 2021-01-25 PROCEDURE — A9585 GADOBUTROL INJECTION: HCPCS | Performed by: STUDENT IN AN ORGANIZED HEALTH CARE EDUCATION/TRAINING PROGRAM

## 2021-01-25 PROCEDURE — 72197 MRI PELVIS W/O & W/DYE: CPT | Mod: 26 | Performed by: RADIOLOGY

## 2021-01-25 PROCEDURE — 72197 MRI PELVIS W/O & W/DYE: CPT

## 2021-01-25 PROCEDURE — 250N000011 HC RX IP 250 OP 636: Performed by: STUDENT IN AN ORGANIZED HEALTH CARE EDUCATION/TRAINING PROGRAM

## 2021-01-25 PROCEDURE — 255N000002 HC RX 255 OP 636: Performed by: STUDENT IN AN ORGANIZED HEALTH CARE EDUCATION/TRAINING PROGRAM

## 2021-01-25 RX ORDER — GADOBUTROL 604.72 MG/ML
0.1 INJECTION INTRAVENOUS ONCE
Status: COMPLETED | OUTPATIENT
Start: 2021-01-25 | End: 2021-01-25

## 2021-01-25 RX ORDER — HEPARIN SODIUM (PORCINE) LOCK FLUSH IV SOLN 100 UNIT/ML 100 UNIT/ML
5 SOLUTION INTRAVENOUS ONCE
Status: COMPLETED | OUTPATIENT
Start: 2021-01-25 | End: 2021-01-25

## 2021-01-25 RX ADMIN — Medication 5 ML: at 09:32

## 2021-01-25 RX ADMIN — GADOBUTROL 7.5 ML: 604.72 INJECTION INTRAVENOUS at 09:26

## 2021-01-25 NOTE — TELEPHONE ENCOUNTER
RNCC called back Emili, pt's facility nurse. She states that PCP is agreeable to manage pt's [place] if Dr. Higuera is okay with this. RNCC advised that it is okay for facility provider to manage for safety and continuity of care.     Last two clinic notes requested for PCP and need to be faxed to : [phone] attn Emili.     Vesna Byrd, LOUISN, RN

## 2021-01-29 ENCOUNTER — PRE VISIT (OUTPATIENT)
Dept: UROLOGY | Facility: CLINIC | Age: 46
End: 2021-01-29

## 2021-01-30 NOTE — TELEPHONE ENCOUNTER
Reason for visit: review imaging      Relevant information: Botox follow up, neurogenic bladder    Records/imaging/labs/orders: imaging report in care everywhere    Pt called: no need for a call

## 2021-02-08 ENCOUNTER — VIRTUAL VISIT (OUTPATIENT)
Dept: UROLOGY | Facility: CLINIC | Age: 46
End: 2021-02-08
Payer: MEDICAID

## 2021-02-08 DIAGNOSIS — N31.9 NEUROGENIC BLADDER: Primary | ICD-10-CM

## 2021-02-08 PROCEDURE — 99213 OFFICE O/P EST LOW 20 MIN: CPT | Mod: 95 | Performed by: UROLOGY

## 2021-02-08 NOTE — PATIENT INSTRUCTIONS
Please begin catheterizing 6 times daily. Please see Kelly Carrington PA-C in one year with imaging completed prior to this appointment.     It was a pleasure meeting with you today.  Thank you for allowing me and my team the privilege of caring for you today.  YOU are the reason we are here, and I truly hope we provided you with the excellent service you deserve.  Please let us know if there is anything else we can do for you so that we can be sure you are leaving completely satisfied with your care experience.

## 2021-02-08 NOTE — PROGRESS NOTES
HISTORY: Marsha Mcneill is a 45 year old female with a history of neurogenic bladder secondary to Multiple Sclerosis.. Patient is wheelchair bound. This is a f/u after her recent botox injection. She reports no improvement in leakage per stoma.     Previous Bladder Surgeries:  She is s/p ileocystoplasty with Erik catheterizable channel in Dec 2012She is s/p ileocystoplasty with Erik catheterizable channel in Dec 2012. Also has an ileostomy.     Current Bladder Medications:  Oxybutynin  Gent instill at night    Current Bladder Management:  The patient catheterizes per Erik stoma into a augmented bladder with a 14F straight catheter q6 hours. Catheterization is performed by  PCA. The patient uses a new catheter each time. She does irrigate the bladder. Irrigation is performed 1 times per day using 240cc of saline/water.     Incontinence History:  Leaking from  Stoma; only from urethra when really full    Exam:  There were no vitals taken for this visit.  GENERAL: Healthy, alert and no distress  EYES: Eyes grossly normal to inspection.  No discharge or erythema, or obvious scleral/conjunctival abnormalities.  RESP: No audible wheeze, cough, or visible cyanosis.  No visible retractions or increased work of breathing.    SKIN: Visible skin clear. No significant rash, abnormal pigmentation or lesions.  NEURO: Cranial nerves grossly intact.  Mentation and speech appropriate for age.  PSYCH: Mentation appears normal, affect normal/bright, judgement and insight intact, normal speech and appearance well-groomed.  Neuro: Diminished on right and left upper limbs, Diminished on right and left lower limbs  Motor: weak in both upper and lower limbs    Review of Imaging:  The following imaging exams were independently viewed and interpreted by me and discussed with patient:  Renal/Bladder Ultrasound: Normal Jan 2021    Review of Labs:  none    Assessment & Plan   Neurogenic bladder secondary to primary progressive multiple  sclerosis.  Urinary incontinence per stoma.  Incontinence has not responded to Botox injections.  Bladder pressures are low.  Incontinence appears to be stress incontinence due to weak valve because the catheterizable channel was plugged into the bladder augment rather than the native bladder.  We discussed the risk benefits and alternatives of the multiple treatment options for the incontinence per the stoma.  Because the incontinence is due to an incompetent valve, further Botox will not be expected to be helpful.  Instead, we focused on more frequent catheterization.  She will increase catheterizing from 4 times a day up to 6 times a day.  If the incontinence gets very bad she can wear a urostomy appliance over the stoma.  We also discussed the option of bulking agent injection into the junction between the channel and the augmented bladder but I advised her against this as the benefits are usually limited to less than a year and require multiple repeat injections with decreasing benefit with each injection.      She understands and agrees to increase the frequency of catheterization to 6 times a day.  She will follow up with my PA partner, Kelly Snellkartik in 1 year with a renal bladder ultrasound prior.      Errol Westbrook MD  [facility] [place]      ==========================    Additional Billing and Coding Information:  Review of the result(s) of each unique test - Renal bladder ultrasound    Independent interpretation of a test performed by another physician/other qualified health care professional (not separately reported) -renal bladder ultrasound    Diagnosis or treatment significantly limited by social determinants of health -patient lives in assisted living with reduced access to healthcare    25 minutes spent on the date of the encounter doing chart review, history and exam, documentation and further activities as noted above    ==========================            Marsha  is a 45 year old who is being evaluated via a billable video visit.     Video Visit Technology for this patient: Angelo Video Visit- Patient was left in waiting room       How would you like to obtain your AVS? MyChart  If the video visit is dropped, the invitation should be resent by: Send to e-mail at: [email or web address]  Will anyone else be joining your video visit? No    Video Start Time: 9:45  Video-Visit Details    Type of service:  Video Visit    Video End Time:10:06 AM    Originating Location (pt. Location): Home    Distant Location (provider location):  [facility] [place]     Platform used for Video Visit: ShaylaWell

## 2021-02-08 NOTE — LETTER
2/8/2021       RE: Marsha Mcneill  [address]  [facility] [address]     Dear Colleague,    Thank you for referring your patient, Marsha Mcneill, to the [facility] UROLOGY CLINIC [place] at [facility]. Please see a copy of my visit note below.    HISTORY: Marsha Mcneill is a 45 year old female with a history of neurogenic bladder secondary to Multiple Sclerosis.. Patient is wheelchair bound. This is a f/u after her recent botox injection. She reports no improvement in leakage per stoma.     Previous Bladder Surgeries:  She is s/p ileocystoplasty with Erik catheterizable channel in Dec 2012She is s/p ileocystoplasty with Erik catheterizable channel in Dec 2012. Also has an ileostomy.     Current Bladder Medications:  Oxybutynin  Gent instill at night    Current Bladder Management:  The patient catheterizes per Erik stoma into a augmented bladder with a 14F straight catheter q6 hours. Catheterization is performed by  PCA. The patient uses a new catheter each time. She does irrigate the bladder. Irrigation is performed 1 times per day using 240cc of saline/water.     Incontinence History:  Leaking from  Stoma; only from urethra when really full    Exam:  There were no vitals taken for this visit.  GENERAL: Healthy, alert and no distress  EYES: Eyes grossly normal to inspection.  No discharge or erythema, or obvious scleral/conjunctival abnormalities.  RESP: No audible wheeze, cough, or visible cyanosis.  No visible retractions or increased work of breathing.    SKIN: Visible skin clear. No significant rash, abnormal pigmentation or lesions.  NEURO: Cranial nerves grossly intact.  Mentation and speech appropriate for age.  PSYCH: Mentation appears normal, affect normal/bright, judgement and insight intact, normal speech and appearance well-groomed.  Neuro: Diminished on right and left upper limbs, Diminished on right and left lower limbs  Motor: weak in both  upper and lower limbs    Review of Imaging:  The following imaging exams were independently viewed and interpreted by me and discussed with patient:  Renal/Bladder Ultrasound: Normal Jan 2021    Review of Labs:  none    Assessment & Plan   Neurogenic bladder secondary to primary progressive multiple sclerosis.  Urinary incontinence per stoma.  Incontinence has not responded to Botox injections.  Bladder pressures are low.  Incontinence appears to be stress incontinence due to weak valve because the catheterizable channel was plugged into the bladder augment rather than the native bladder.  We discussed the risk benefits and alternatives of the multiple treatment options for the incontinence per the stoma.  Because the incontinence is due to an incompetent valve, further Botox will not be expected to be helpful.  Instead, we focused on more frequent catheterization.  She will increase catheterizing from 4 times a day up to 6 times a day.  If the incontinence gets very bad she can wear a urostomy appliance over the stoma.  We also discussed the option of bulking agent injection into the junction between the channel and the augmented bladder but I advised her against this as the benefits are usually limited to less than a year and require multiple repeat injections with decreasing benefit with each injection.      She understands and agrees to increase the frequency of catheterization to 6 times a day.  She will follow up with my PA partner, Kelly Carrington in 1 year with a renal bladder ultrasound prior.      Errol Westbrook MD  [facility] UROLOGY CLINIC [place]      ==========================    Additional Billing and Coding Information:  Review of the result(s) of each unique test - Renal bladder ultrasound    Independent interpretation of a test performed by another physician/other qualified health care professional (not separately reported) -renal bladder ultrasound    Diagnosis or treatment  significantly limited by social determinants of health -patient lives in assisted living with reduced access to healthcare    25 minutes spent on the date of the encounter doing chart review, history and exam, documentation and further activities as noted above    ==========================            Marsha is a 45 year old who is being evaluated via a billable video visit.     Video Visit Technology for this patient: Angelo Video Visit- Patient was left in waiting room       How would you like to obtain your AVS? MyChart  If the video visit is dropped, the invitation should be resent by: Send to e-mail at: [email or web address] [employer] Show  Will anyone else be joining your video visit? No    Video Start Time: 9:45  Video-Visit Details    Type of service:  Video Visit    Video End Time:10:06 AM    Originating Location (pt. Location): Home    Distant Location (provider location):  [facility] UROLOGY CLIN[facility] [place]     Platform used for Video Visit: ShaylaWell

## 2021-02-09 ENCOUNTER — TELEPHONE (OUTPATIENT)
Dept: UROLOGY | Facility: CLINIC | Age: 46
End: 2021-02-09

## 2021-02-09 NOTE — TELEPHONE ENCOUNTER
LVM for patient to Please see Kelly Carrington PA-C in one year with imaging completed prior to this appointment.

## 2021-02-26 ENCOUNTER — TELEPHONE (OUTPATIENT)
Dept: WOUND CARE | Facility: CLINIC | Age: 46
End: 2021-02-26

## 2021-02-26 NOTE — TELEPHONE ENCOUNTER
Patient called to report that she recently had an MRI done and her PC provider would like for you to take a look at it to see if there was anything that you noticed to be concerning, ie; tunneling. Patient is still experiencing a lot of pain.

## 2021-03-17 ENCOUNTER — TELEPHONE (OUTPATIENT)
Dept: WOUND CARE | Facility: CLINIC | Age: 46
End: 2021-03-17

## 2021-03-18 ENCOUNTER — TELEPHONE (OUTPATIENT)
Dept: WOUND CARE | Facility: CLINIC | Age: 46
End: 2021-03-18

## 2021-03-18 NOTE — TELEPHONE ENCOUNTER
Pt asking if Jessica Moya PA-C has seen the MRI results. Will have Jessica review the results tomorrow and will return call to pt to discuss.  [phone]

## 2021-03-18 NOTE — TELEPHONE ENCOUNTER
Marsha called as she missed the nurses call and it is very difficult for her to get to the phone, so try dialing twice when calling and then leave a message if she does not  and she will call back asap.

## 2021-04-18 ENCOUNTER — HEALTH MAINTENANCE LETTER (OUTPATIENT)
Age: 46
End: 2021-04-18

## 2021-08-07 ENCOUNTER — HEALTH MAINTENANCE LETTER (OUTPATIENT)
Age: 46
End: 2021-08-07

## 2021-10-02 ENCOUNTER — HEALTH MAINTENANCE LETTER (OUTPATIENT)
Age: 46
End: 2021-10-02

## 2022-02-17 PROBLEM — L98.8 OTHER SPECIFIED DISORDERS OF THE SKIN AND SUBCUTANEOUS TISSUE: Status: ACTIVE | Noted: 2020-06-24

## 2022-05-14 ENCOUNTER — HEALTH MAINTENANCE LETTER (OUTPATIENT)
Age: 47
End: 2022-05-14

## 2022-06-07 ENCOUNTER — TELEPHONE (OUTPATIENT)
Dept: NEUROLOGY | Facility: CLINIC | Age: 47
End: 2022-06-07
Payer: MEDICAID

## 2022-06-07 NOTE — TELEPHONE ENCOUNTER
[facility] Call Center    Phone Message    May a detailed message be left on voicemail: yes     Reason for Call: Other: Pt is a previous Pt of Dr. Hall at [facility]. Pt stated that she had her Rituxan infuson in January and is due for another soon. Pt was scheduled for and appt with Dr. Hall 08/16/22 but will need the infusion before then. Please call Pt back with further information.      Action Taken: Message routed to:  [facility]    Travel Screening: Not Applicable

## 2022-06-09 NOTE — TELEPHONE ENCOUNTER
Would she be able to see me 6/15 at 2PM?     Then I could assess how she is doing before ordering the rituximab    Ny Hall MD on 6/9/2022 at 11:09 AM

## 2022-06-09 NOTE — TELEPHONE ENCOUNTER
Spoke with Marsha, who used to see Dr Hall at [facility] and is now scheduled to see Dr Hall in our clinic in August. Last rituximab infusion in January of this year, infuses every 6 months, so will be due before clinic visit in August. Infuses at [facility] in [place] (fax [phone]). Per [facility] documentation, pt received two infusions, first in December, second in January. Routing to Dr Hall to please place infusion orders.     Rossana Lee RN

## 2022-06-10 NOTE — TELEPHONE ENCOUNTER
Action 6/10/22 MV 7.34am   Action Taken 1) imaging request faxed to PN  2) labs request faxed to [facility]  UPDATE: [facility] images resolved in PACS ; labs received from [facility]         RECORDS RECEIVED FROM: self - prev pt   REASON FOR VISIT: MS   Date of Appt: 6/15/22   NOTES (FOR ALL VISITS) STATUS DETAILS   OFFICE NOTE from other specialist Care Everywhere Dr Ny Hall @  Neuro:  11/16/21 7/6/21 2/1/21  (additional)    Dr Lombardi @ [facility] Neurology:  1/14/19    Dr Dung Mendes @ [facility] Neurology:  4/3/18   LABS Received [facility]:  JASON Labs 6/3/20   MEDICATION LIST Care Everywhere    IMAGING  (FOR ALL VISITS)     MRI (HEAD, NECK, SPINE) Received [facility]:  MRI Brain 3/29/21    [facility]:  MRI Cervical Spine 12/12/19  MRI Thoracic Spine 12/12/19  MRI Head 12/12/19

## 2022-06-15 ENCOUNTER — PRE VISIT (OUTPATIENT)
Dept: NEUROLOGY | Facility: CLINIC | Age: 47
End: 2022-06-15
Payer: MEDICAID

## 2022-07-05 ENCOUNTER — TELEPHONE (OUTPATIENT)
Dept: NEUROLOGY | Facility: CLINIC | Age: 47
End: 2022-07-05

## 2022-07-05 NOTE — TELEPHONE ENCOUNTER
Spoke with Marsha and informed her Dr Hall has reviewed lab results and recommends we wait until August appointment before scheduling more rituximab. Pt agrees with plan.    Rossana Lee RN

## 2022-07-05 NOTE — TELEPHONE ENCOUNTER
Please notify patient that B cells are completely suppressed    I recommend that we wait until her august appointment until scheduling more rituximab    Ny Hall MD on 7/5/2022 at 12:56 PM

## 2022-08-07 ENCOUNTER — MEDICAL CORRESPONDENCE (OUTPATIENT)
Dept: NEUROLOGY | Facility: CLINIC | Age: 47
End: 2022-08-07

## 2022-08-16 ENCOUNTER — OFFICE VISIT (OUTPATIENT)
Dept: NEUROLOGY | Facility: CLINIC | Age: 47
End: 2022-08-16
Attending: PSYCHIATRY & NEUROLOGY
Payer: MEDICAID

## 2022-08-16 VITALS
HEART RATE: 89 BPM | DIASTOLIC BLOOD PRESSURE: 61 MMHG | TEMPERATURE: 98.3 F | OXYGEN SATURATION: 97 % | SYSTOLIC BLOOD PRESSURE: 95 MMHG

## 2022-08-16 DIAGNOSIS — Z51.81 THERAPEUTIC DRUG MONITORING: ICD-10-CM

## 2022-08-16 DIAGNOSIS — G83.89: ICD-10-CM

## 2022-08-16 DIAGNOSIS — G35 MS (MULTIPLE SCLEROSIS) (H): Primary | ICD-10-CM

## 2022-08-16 DIAGNOSIS — G82.50 QUADRIPARESIS (H): ICD-10-CM

## 2022-08-16 PROCEDURE — 99215 OFFICE O/P EST HI 40 MIN: CPT | Performed by: PSYCHIATRY & NEUROLOGY

## 2022-08-16 RX ORDER — DIAZEPAM 5 MG
TABLET ORAL
Qty: 3 TABLET | Refills: 0 | Status: SHIPPED | OUTPATIENT
Start: 2022-08-16

## 2022-08-16 NOTE — LETTER
8/16/2022       RE: Marsha Mcneill  [address]  [facility] [address]     Dear Colleague,    Thank you for referring your patient, Marsha Mcneill, to the [facility] MULTIPLE SCLEROSIS CLINIC [place] at [facility]. Please see a copy of my visit note below.    Date of Service: 8/16/2022    [facility] Neurology   MS Clinic Evaluation     Subjective: 47-year-old woman who has a history of juvenile rheumatoid arthritis, anxiety and depression who presents in follow-up for multiple sclerosis.    She reports that overall things are stable.  She has not experienced any new symptoms related to multiple sclerosis.  She continues to have use of the left upper extremity only.  However she feels like the left hand strength and mobility is stable.  She is still able to eat, but does require some assistance getting food onto her utensil.    She has a sacral wound that is being addressed by her nursing home.  This is chronic.  They are planning to place a wound VAC.  She uses ice packs and Tylenol for pain.    She is followed by rehab medicine through [facility].    She does take Valium as needed for painful spasms, but this does not occur routinely.  She has a baclofen pump and does take baclofen orally.  When this painful spasms occur they affect her arms, chest and neck extension.      Disease onset: age 26 (2001) numbness/weakness all four extremities  Last relapse: age 45, 4/2021, left arm weakness    Progression onset: around age 31    Prior DMDs:   Copaxone [id number], discontinued due to inefficacy  Rebif 5573-2001, discontinued due to personal cost and perceived inefficacy  Rituximab of April 2019 for a total of 2 doses, discontinued due to change in neurologists  Ocrevus 6/2020-12/2020, discontinued because clinical symptoms while on drug and insurance would not continue  rituximab 12/1/2021 & 1/5/2022      Allergies   Allergen Reactions     Augmentin Unknown, GI  Disturbance and Nausea and Vomiting     vomiting       Mushroom GI Disturbance     vomiting     Povidone Iodine Rash     Amoxicillin Nausea and Vomiting     Aspirin Other (See Comments)     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     Macrobid [Nitrofurantoin Anhydrous] Nausea and Vomiting       Current Outpatient Medications   Medication     acetaminophen (TYLENOL) 500 MG tablet     baclofen (GABLOFEN) 61016 MCG/20ML injection     baclofen (LIORESAL) 10 MG tablet     calcium carbonate (OS-LILIBETH 600 MG [place]. [place]) 600 MG tablet     calcium carbonate (TUMS) 500 MG chewable tablet     calcium carbonate 600 mg-vitamin D 400 units (CALTRATE) 600-400 MG-UNIT per tablet     celecoxib (CELEBREX) 100 MG capsule     cholecalciferol (VITAMIN D3) 5000 units (125 mcg) capsule     diazepam (VALIUM) 5 MG tablet     doxepin (SINEQUAN) 25 MG capsule     FLUoxetine (PROZAC) 20 MG capsule     FLUoxetine (PROZAC) 40 MG capsule     gabapentin (NEURONTIN) 300 MG capsule     gentamicin (GARAMYCIN) 40 MG/ML injection     HYDROcodone-acetaminophen (NORCO) 5-325 MG tablet     ibuprofen (ADVIL/MOTRIN) 600 MG tablet     LACTASE ENZYME 3000 units tablet     lidocaine (XYLOCAINE) 2 % external gel     lidocaine (XYLOCAINE) 5 % external ointment     loratadine (CLARITIN) 10 MG tablet     medroxyPROGESTERone (DEPO-PROVERA) 150 MG/ML injection     Melatonin 3 MG SUBL     morphine (MS CONTIN) 15 MG CR tablet     NEW MED     NONFORMULARY     nystatin (MYCOSTATIN) 953695 UNIT/GM external cream     oxybutynin ER (DITROPAN XL) 15 MG 24 hr tablet     oxyCODONE (ROXICODONE) 5 MG tablet     pregabalin (LYRICA) 100 MG capsule     pregabalin (LYRICA) 150 MG capsule     silver sulfADIAZINE (SILVADENE) 1 % external cream     SM NASAL DECONGESTANT PE 10 MG TABS     sodium chloride 0.9%, bottle, 0.9 % irrigation     traZODone (DESYREL) 100 MG tablet     levonorgestrel (MIRENA) 20 MCG/24HR IUD     Current Facility-Administered Medications   Medication     Botulinum Toxin Type A  (BOTOX) 200 units injection 200 Units        Past medical, surgical, social and family history was personally reviewed. Pertinent details noted above.     Physical Examination:   BP 95/61 (BP Location: Right arm, Patient Position: Sitting, Cuff Size: Adult Regular)   Pulse 89   Temp 98.3  F (36.8  C) (Oral)   SpO2 97%     General: no acute distress  Cranial nerves:   VFFC  PERRL w/no RAPD  EOM full w/L NINFA   Face symmetric  Hearing intact  Mild/mod spastic scanning dysarthria  Motor:   Tone is generally reduced aside from LUE where she is spastic, developing contractures    R L  Deltoid  2 3  Biceps  1 4  Triceps 1 2  Wrist ext 1 4-  Finger ext 1 2  Finger abd 1 1    Hip flexion 0 0  Knee flexion 0 0  Knee ext 0 0  Ankle d/f 0 0    Reflexes: reduced t/o, no ankle clonus  Sensory: vibration is mod reduced in the ankles, mild in the knees  Coordination: no ataxia or dysmetria  Gait: wcb    Tests/Imaging:   JASON virus Ab 0.08 7/2020  Vitamin D 52    MRI brain   12/2019 - overall mild burden no significant posterior fossa lesions, periventricular and juxtacortical lesions noted, gd-, mild atrophy noted for age  3/2021 - no new lesions, gd-      MRI cervical spine   12/2019 - large lesion extending from c2-c4, gd-, associated atrophy     MRI thoracic spine   12/2019 - c7-t1 lesion, additiona 3 lesions possibly present but quality is suboptimal, gd-       Assessment: 47-year-old woman with relapsing remitting multiple sclerosis who appears to be clinically stable on rituximab.  She will continue to get rituximab periodically.  Timing of dosing will be dependent upon her CD20 counts.    We discussed today how her wound healing is likely slowed by her rituximab therapy.  This is why I have been conservative with administration of the drug.    She is overdue for radiologic surveillance.  This will be arranged today.  Blood work to determine when the next dose of rituximab should be administered.    She is developing  contractures that are limiting her use of the left upper extremity.  It is important for her to undergo range of motion activities every other day.    Plan:   -Blood work  - MRI  - Plan to continue rituximab  - Follow-up in 6 months    Note was completed with the assistance of Dragon Fluency software which can often result in accidental word substitutions.     A total of 40 minutes on the date of service were spent in the care of this patient.     Ny Hall MD on 8/16/2022 at 10:51 AM

## 2022-08-16 NOTE — PROGRESS NOTES
Date of Service: 8/16/2022    [facility] Neurology   MS Clinic Evaluation     Subjective: 47-year-old woman who has a history of juvenile rheumatoid arthritis, anxiety and depression who presents in follow-up for multiple sclerosis.    She reports that overall things are stable.  She has not experienced any new symptoms related to multiple sclerosis.  She continues to have use of the left upper extremity only.  However she feels like the left hand strength and mobility is stable.  She is still able to eat, but does require some assistance getting food onto her utensil.    She has a sacral wound that is being addressed by her nursing home.  This is chronic.  They are planning to place a wound VAC.  She uses ice packs and Tylenol for pain.    She is followed by rehab medicine through [facility].    She does take Valium as needed for painful spasms, but this does not occur routinely.  She has a baclofen pump and does take baclofen orally.  When this painful spasms occur they affect her arms, chest and neck extension.      Disease onset: age 26 (2001) numbness/weakness all four extremities  Last relapse: age 45, 4/2021, left arm weakness    Progression onset: around age 31    Prior DMDs:   Copaxone [id number], discontinued due to inefficacy  Rebif 4857-4532, discontinued due to personal cost and perceived inefficacy  Rituximab of April 2019 for a total of 2 doses, discontinued due to change in neurologists  Ocrevus 6/2020-12/2020, discontinued because clinical symptoms while on drug and insurance would not continue  rituximab 12/1/2021 & 1/5/2022      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     Macrobid [Nitrofurantoin Anhydrous] Nausea and Vomiting       Current Outpatient Medications    Medication     acetaminophen (TYLENOL) 500 MG tablet     baclofen (GABLOFEN) 45455 MCG/20ML injection     baclofen (LIORESAL) 10 MG tablet     calcium carbonate (OS-LILIBETH 600 MG [place]. CA) 600 MG tablet     calcium carbonate (TUMS) 500 MG chewable tablet     calcium carbonate 600 mg-vitamin D 400 units (CALTRATE) 600-400 MG-UNIT per tablet     celecoxib (CELEBREX) 100 MG capsule     cholecalciferol (VITAMIN D3) 5000 units (125 mcg) capsule     diazepam (VALIUM) 5 MG tablet     doxepin (SINEQUAN) 25 MG capsule     FLUoxetine (PROZAC) 20 MG capsule     FLUoxetine (PROZAC) 40 MG capsule     gabapentin (NEURONTIN) 300 MG capsule     gentamicin (GARAMYCIN) 40 MG/ML injection     HYDROcodone-acetaminophen (NORCO) 5-325 MG tablet     ibuprofen (ADVIL/MOTRIN) 600 MG tablet     LACTASE ENZYME 3000 units tablet     lidocaine (XYLOCAINE) 2 % external gel     lidocaine (XYLOCAINE) 5 % external ointment     loratadine (CLARITIN) 10 MG tablet     medroxyPROGESTERone (DEPO-PROVERA) 150 MG/ML injection     Melatonin 3 MG SUBL     morphine (MS CONTIN) 15 MG CR tablet     NEW MED     NONFORMULARY     nystatin (MYCOSTATIN) 634711 UNIT/GM external cream     oxybutynin ER (DITROPAN XL) 15 MG 24 hr tablet     oxyCODONE (ROXICODONE) 5 MG tablet     pregabalin (LYRICA) 100 MG capsule     pregabalin (LYRICA) 150 MG capsule     silver sulfADIAZINE (SILVADENE) 1 % external cream     SM NASAL DECONGESTANT PE 10 MG TABS     sodium chloride 0.9%, bottle, 0.9 % irrigation     traZODone (DESYREL) 100 MG tablet     levonorgestrel (MIRENA) 20 MCG/24HR IUD     Current Facility-Administered Medications   Medication     Botulinum Toxin Type A (BOTOX) 200 units injection 200 Units        Past medical, surgical, social and family history was personally reviewed. Pertinent details noted above.     Physical Examination:   BP 95/61 (BP Location: Right arm, Patient Position: Sitting, Cuff Size: Adult Regular)   Pulse 89   Temp 98.3  F (36.8  C) (Oral)    SpO2 97%     General: no acute distress  Cranial nerves:   VFFC  PERRL w/no RAPD  EOM full w/L NINFA   Face symmetric  Hearing intact  Mild/mod spastic scanning dysarthria  Motor:   Tone is generally reduced aside from LUE where she is spastic, developing contractures    R L  Deltoid  2 3  Biceps  1 4  Triceps 1 2  Wrist ext 1 4-  Finger ext 1 2  Finger abd 1 1    Hip flexion 0 0  Knee flexion 0 0  Knee ext 0 0  Ankle d/f 0 0    Reflexes: reduced t/o, no ankle clonus  Sensory: vibration is mod reduced in the ankles, mild in the knees  Coordination: no ataxia or dysmetria  Gait: wcb    Tests/Imaging:   JASON virus Ab 0.08 7/2020  Vitamin D 52    MRI brain   12/2019 - overall mild burden no significant posterior fossa lesions, periventricular and juxtacortical lesions noted, gd-, mild atrophy noted for age  3/2021 - no new lesions, gd-      MRI cervical spine   12/2019 - large lesion extending from c2-c4, gd-, associated atrophy     MRI thoracic spine   12/2019 - c7-t1 lesion, additiona 3 lesions possibly present but quality is suboptimal, gd-       Assessment: 47-year-old woman with relapsing remitting multiple sclerosis who appears to be clinically stable on rituximab.  She will continue to get rituximab periodically.  Timing of dosing will be dependent upon her CD20 counts.    We discussed today how her wound healing is likely slowed by her rituximab therapy.  This is why I have been conservative with administration of the drug.    She is overdue for radiologic surveillance.  This will be arranged today.  Blood work to determine when the next dose of rituximab should be administered.    She is developing contractures that are limiting her use of the left upper extremity.  It is important for her to undergo range of motion activities every other day.    Plan:   -Blood work  - MRI  - Plan to continue rituximab  - Follow-up in 6 months    Note was completed with the assistance of Dragon Fluency software which can often  result in accidental word substitutions.     A total of 40 minutes on the date of service were spent in the care of this patient.   Ny Hall MD on 8/16/2022 at 10:51 AM

## 2022-08-16 NOTE — NURSING NOTE
Chief Complaint   Patient presents with     RECHECK     MS per patient       Nohelia Almonte CMA at 10:51 AM on 8/16/2022.

## 2022-08-16 NOTE — PATIENT INSTRUCTIONS
Your exam is stable    Blood work today   If there is a trend that your b cells are recovering, then plan for one rituximab infusion   If they are still low, then will repeat in 1 month (blood work)     Mri brain and cervical spine   Take 1 tablet of valium beforehand    Follow up in 6 months

## 2022-09-03 ENCOUNTER — HEALTH MAINTENANCE LETTER (OUTPATIENT)
Age: 47
End: 2022-09-03

## 2022-09-06 ENCOUNTER — MYC MEDICAL ADVICE (OUTPATIENT)
Dept: NEUROLOGY | Facility: CLINIC | Age: 47
End: 2022-09-06

## 2022-09-22 ENCOUNTER — TELEPHONE (OUTPATIENT)
Dept: UROLOGY | Facility: CLINIC | Age: 47
End: 2022-09-22

## 2022-09-22 DIAGNOSIS — N31.9 NEUROGENIC BLADDER: Primary | ICD-10-CM

## 2022-09-22 NOTE — TELEPHONE ENCOUNTER
[facility] Call Center    Phone Message    May a detailed message be left on voicemail: yes     Reason for Call: Order(s): Other:   Reason for requested: Syringes and Saline order   Date needed: Asap  Provider name: Dr. Westbrook    Patient requesting this order for supplies be faxed to [facility] at [phone].  Patient is having troubles with her insurance wanting to cover her supplies and hoping with a prescription they will cover. Please reach out to patient when order has been sent. Thank you      Action Taken: Message routed to:  [facility] Uro    Travel Screening: Not Applicable

## 2022-09-22 NOTE — TELEPHONE ENCOUNTER
Call returned. Reviewed saline vs tap water/distilled water per provider. Pt scheduled with Kelly Carrington PA-C for a video visit.     Pt will call back with information for us to fax her renal US order to.    Kasia Zimmer CMA  09/22/22  2:50 PM

## 2022-12-21 ENCOUNTER — PRE VISIT (OUTPATIENT)
Dept: UROLOGY | Facility: CLINIC | Age: 47
End: 2022-12-21

## 2022-12-21 ENCOUNTER — TELEPHONE (OUTPATIENT)
Dept: UROLOGY | Facility: CLINIC | Age: 47
End: 2022-12-21

## 2022-12-21 DIAGNOSIS — N31.9 NEUROGENIC BLADDER: Primary | ICD-10-CM

## 2022-12-21 NOTE — TELEPHONE ENCOUNTER
Right radial artery. Accessed successfully.  Number of attempts =  1. Writer faxed order to [phone]

## 2022-12-21 NOTE — TELEPHONE ENCOUNTER
----- Message from Tres Rondon sent at 12/21/2022 11:58 AM CST -----  Please send imaging orders to [facility] in [place], [place]

## 2022-12-29 NOTE — PROGRESS NOTES
Urology Virtual Visit - Follow Up    Reason for visit: follow up for neurogenic bladder    History: Marsha Mcneill is a 47 year old female with a history of neurogenic bladder secondary to Multiple Sclerosis.. Patient is wheelchair bound.     Last visit with Dr. Westbrook 2/8/21:  This is a f/u after her recent botox injection. She reports no improvement in leakage per stoma.     Previous Bladder Surgeries:  She is s/p ileocystoplasty with Erik catheterizable channel in Dec 2012. She is s/p ileocystoplasty with Erik catheterizable channel in Dec 2012. Also has an ileostomy.     Current Bladder Medications:  Oxybutynin  Gent instill at night    Current Bladder Management:  The patient catheterizes per Erik stoma into a augmented bladder with a 14F straight catheter q6 hours. Catheterization is performed by  PCA. The patient uses a new catheter each time. She does irrigate the bladder. Irrigation is performed 1 times per day using 240cc of saline/water.     Incontinence History:  Leaking from  Stoma; only from urethra when really full    On that date, she was recommended to increase frequency of CIC to 6 times per day.    TODAY   1/3/23:  Doing well. She is catheterizing 3-4 times per day (her fiance/caregiver catheterizes her). Occasionally feels like the catheter gets stuck every couple of days which resolves with gentle repositioning of the catheter.  Urinary incontinence is minimal and improved.   No UTIs recently, none in the last year. No longer doing nightly gent instillations. Stopped in the nursing home.  No gross hematuria.  Irrigates every day. Moderate amount of sediment/mucous. Improves with PO hydration.   No other concerns.    Exam:  There were no vitals taken for this visit.  GENERAL: Healthy, alert and no distress  EYES: Eyes grossly normal to inspection.  No discharge or erythema, or obvious scleral/conjunctival abnormalities.  RESP: No audible wheeze, cough, or visible cyanosis.  No visible  retractions or increased work of breathing.    SKIN: Visible skin clear. No significant rash, abnormal pigmentation or lesions.  NEURO: Cranial nerves grossly intact.  Mentation and speech appropriate for age.  PSYCH: Mentation appears normal, affect normal/bright, judgement and insight intact, normal speech and appearance well-groomed.    Review of Imaging:  EXAM: US RENAL BILATERAL   LOCATION: [facility]   DATE/TIME: 12/28/2022 2:45 PM     FINDINGS:     RIGHT KIDNEY: 8.7 cm. Normal without hydronephrosis or masses.     LEFT KIDNEY: 10.3 cm. Normal without hydronephrosis or masses.     BLADDER: Neurogenic bladder. Mild bladder wall thickening. There is   linear material present with the appearance of some associated   vascularity, which may be artifactual.     IMPRESSION:   1.  Normal appearance of the kidneys.   2.  Neurogenic bladder. There is linear material present with the   appearance of some associated vascularity. The previously questioned   debris within the bladder on prior ultrasound appeared more nodular.   This may represent debris. However, consider correlation with cystoscopy.      CT ABDOMEN PELVIS ENTEROGRAPHY w CONTRAST   EXAM: CT ABDOMEN PELVIS ENTEROGRAPHY w CONTRAST   LOCATION: [facility]   DATE/TIME: 6/14/2022 11:00 AM     FINDINGS:     ENTEROGRAPHY: Unremarkable appearance of the distal esophagus. The   stomach is diffusely distended but tapers to normal caliber by the   duodenum. Few distended to borderline dilated loops of small bowel   are identified, which tapers to normal caliber by the left lower   quadrant ileostomy. The small bowel at the left lower quadrant stoma   is minimally thickened but otherwise unremarkable in appearance. No   evidence for penetrating disease. No definite focal mesenteric   engorgement, mural stratification, or pseudosacculations.   Postoperative changes involving the cecum.     ADDITIONAL FINDINGS: Right basal scarring and atelectasis. Trace   bilateral  pleural effusions. Left lower lobe calcified granuloma.     Given the technique, unremarkable appearance of the liver,   gallbladder, pancreas, spleen, and adrenal glands. Subcentimeter   renal hypodensities are too small to characterize but statistically   are favored to reflect cysts. No collecting system dilatation.   Trabeculated urinary bladder with prior Mitrofanoff procedure exiting   through the right lower quadrant. A few nondependent foci of gas are   identified in the urinary bladder. Indwelling IUD. No large pelvic   mass. No pelvic ascites.     Normal caliber abdominal aorta. Patent portal, splenic, and superior   mesenteric veins. Patent bilateral renal veins. No abdominopelvic   lymphadenopathy.     Marked osteopenia. Mild to moderate osteoarthrosis of the hips.   Multilevel degenerative changes of the spine. Bilateral L5 pars   defects. Battery pack in the lateral right abdominopelvic wall with   the lead/catheter extending into the thoracic spine and out of the   field-of-view.     IMPRESSION:   1.  Few distended to borderline dilated loops of small bowel tapering   to normal caliber by the left lower quadrant stoma. The small bowel   in this region is minimally thickened but otherwise unremarkable in   appearance.   2.  Trace pleural effusions.   3.  Prior Mitrofanoff procedure. The indwelling foci of gas likely   due to instrumentation. However, correlate clinically for possible   urinary tract infection.   4.  Marked osteopenia.      CT ABDOMEN PELVIS wo CONTRAST   EXAM: CT ABDOMEN PELVIS wo CONTRAST   LOCATION: [facility]   DATE/TIME: 5/13/2022 8:20 PM       FINDINGS:   LOWER CHEST: Atelectasis right lower lobe significantly decreased   since previous examination. Mild bibasilar bronchiectasis. Calcified   granuloma left lower lobe.     HEPATOBILIARY: Probable sludge debris in the gallbladder. No   calcified gallstones or gallbladder wall thickening/inflammatory   changes. Noncontrast views of  the liver unremarkable. No bile duct   dilatation.     PANCREAS: Mild pancreatic atrophy.     SPLEEN: Normal.     ADRENAL GLANDS: Normal.     KIDNEYS/BLADDER: Air in the nondependent portion of the bladder may   be due to recent catheterization. The kidneys and ureters are   unremarkable. No hydronephrosis.     BOWEL: Extensive postoperative changes involving the small bowel with   ileostomy in the mid abdomen. Stable malrotation of the small bowel.   There are multiple gas and fluid dilated loops of small bowel in the   right abdomen and upper pelvis. Distal small bowel is decompressed as   is the colon. Findings consistent with mechanical small bowel   obstruction or definite transition point not identified. No free air   or free fluid. Mild haziness in the right abdominal mesentery similar   to previous examination.     LYMPH NODES: Normal.     VASCULATURE: Mild aortic calcification.     PELVIC ORGANS: IUD in the uterus.     MUSCULOSKELETAL: Bilateral pars interarticularis defects at L5 with   mild anterior subluxation L5 on S1. Prominent lumbosacral lordosis.   Pump device right lateral abdomen with leads or tubing extending into   the thecal space of the thoracolumbar spine. Diffuse pelvic muscular   atrophy.     IMPRESSION:   1.  Findings consistent with small bowel obstruction with definite   transition point not confidently identified.   2.  Extensive postoperative changes involving the abdomen and small   bowel with ileostomy midabdomen. Mild haziness in the right abdominal   mesentery stable. No free air or abscess.   3.  Sludge debris in the gallbladder.   4.  Bilateral pars interarticularis defects at L5 with mild anterior   subluxation L5 on S1 with associated hypertrophic and degenerative   changes. Prominent lumbosacral lordosis. Findings stable.   5.  Stable diffuse muscular atrophy in the pelvis.   6.  Coarse atelectasis right lower lobe decreased.   7.  Air in the nondependent portion of the  bladder may be secondary   to recent catheterization.      CT ABDOMEN PELVIS wo CONTRAST   EXAM: CT ABDOMEN PELVIS wo CONTRAST   LOCATION: [facility]   DATE/TIME: 3/21/2022 10:00 AM       FINDINGS:   LOWER CHEST: Right lower lobe bronchiectasis and volume loss. Left   lower lobe granuloma.     HEPATOBILIARY: Normal noncontrast appearance of the liver. No   calcified gallstones.     PANCREAS: No acute peripancreatic inflammatory changes.     SPLEEN: Normal size.     ADRENAL GLANDS: Normal.     KIDNEYS/BLADDER: Tiny nonobstructing calyceal tip stone upper right   kidney. Otherwise, normal noncontrast appearance of the kidneys. No   hydronephrosis. Air in the bladder lumen may be secondary to   instrumentation/catheterization. The right bladder dome is tethered   towards the right lower quadrant.     BOWEL: Previous small bowel resection with right lower quadrant   ileostomy. The small bowel does not cross midline suggesting small   bowel malrotation, although given the extensive small bowel   resection, this appearance may be postoperative. Gastric distention.   The small bowel is diffusely dilated with segments measuring up to   4.7 cm. There is some tapering at the ileostomy, but no definitive   transition point. Mild perienteric edema. No definite bowel wall   pneumatosis. No intraperitoneal free air. The colon is decompressed.   Postoperative changes at the cecum.     LYMPH NODES: No lymphadenopathy.     VASCULATURE: Normal caliber abdominal aorta.     PELVIC ORGANS: IUD. No adnexal mass.     OTHER: No drainable fluid collections in the abdomen or pelvis. No   free air.     MUSCULOSKELETAL: Exaggerated lumbar lordosis. Chronic L5 pars defects   with mild anterolisthesis of L5 on S1. Baclofen pump. Scattered   skeletal degenerative changes.     IMPRESSION:   1.  Dilated loops of small bowel consistent with bowel obstruction.    This tapers at the ileostomy site without discrete transition point.   Mild perienteric  edema. No pneumatosis or free air.     2.  The duodenum does not cross midline and all small bowel loops are   in the central or right hemiabdomen compatible with malrotation.   However given previous bowel resection, this appearance may be   postoperative.      US RENAL BILATERAL   EXAM: US RENAL BILATERAL   LOCATION: [facility]   DATE/TIME: 1/22/2021 2:00 PM       FINDINGS:     RIGHT KIDNEY: 11.4 cm. Normal without hydronephrosis or masses.     LEFT KIDNEY: 11.3 cm. Normal without hydronephrosis or masses.     BLADDER: Decompressed with mild bladder wall trabeculation. Dependent   echogenicity is incompletely characterized and could reflect debris   or hemorrhage. Mass/neoplasm not excluded.     IMPRESSION:   1.  Normal kidney ultrasound.   2.  Decompressed bladder with dependent nonspecific echogenic   material/tissue.      Review of Labs:  5/16/22 - serum Cr: 0.33      Assessment & Plan   47 year old female with neurogenic bladder secondary to primary progressive multiple sclerosis s/p ileocystoplasty and Erik channel. Doing well. Her incontinence has improved and is minimal at present. Continues on oxybutynin 30 mg daily, filled by other member of her care team. No UTIs in over a year and no longer doing nightly gentamicin bladder instillations. Recent renal ultrasound is without hydronephrosis. Some possible debris in the bladder which was present on prior ultrasounds as well. Review of at least 3 CT scan reports over the last year indicates no mention of bladder mass. We discussed possible cystoscopy for intravesical examination but it was mutually agreed to defer for now given no gross hematuria, recent UTIs, or evidence for a concerning mass on CT scans. She will continue her daily bladder irrigation and work to stay well hydrated as good PO water intake leads to a decrease in urinary sediment.    Plan:  -Continue CIC and irrigation regimen.  -Follow up with me in 1 year with renal/bladder ultrasound  prior.     Kelly Carrington PA-C  Department of Urology        25 minutes spent on the date of the encounter doing chart review, review of outside records, review of test results, interpretation of tests, patient visit and documentation

## 2023-01-03 ENCOUNTER — VIRTUAL VISIT (OUTPATIENT)
Dept: UROLOGY | Facility: CLINIC | Age: 48
End: 2023-01-03
Payer: MEDICARE

## 2023-01-03 DIAGNOSIS — N31.9 NEUROGENIC BLADDER: Primary | ICD-10-CM

## 2023-01-03 PROBLEM — F51.04 CHRONIC INSOMNIA: Status: ACTIVE | Noted: 2020-08-13

## 2023-01-03 PROBLEM — G83.89: Status: ACTIVE | Noted: 2020-05-11

## 2023-01-03 PROBLEM — T82.898A: Status: ACTIVE | Noted: 2020-05-29

## 2023-01-03 PROBLEM — F41.1 GAD (GENERALIZED ANXIETY DISORDER): Status: ACTIVE | Noted: 2021-08-12

## 2023-01-03 PROBLEM — M81.8 OTHER OSTEOPOROSIS WITHOUT CURRENT PATHOLOGICAL FRACTURE: Status: ACTIVE | Noted: 2022-11-11

## 2023-01-03 PROBLEM — G82.50 SPASTIC QUADRIPLEGIA SECONDARY TO MULTIPLE SCLEROSIS (H): Status: ACTIVE | Noted: 2022-03-14

## 2023-01-03 PROBLEM — G35 SPASTIC QUADRIPLEGIA SECONDARY TO MULTIPLE SCLEROSIS (H): Status: ACTIVE | Noted: 2022-03-14

## 2023-01-03 PROBLEM — M75.01 SECONDARY ADHESIVE CAPSULITIS OF RIGHT SHOULDER: Status: ACTIVE | Noted: 2020-03-10

## 2023-01-03 PROBLEM — H50.30 INTERMITTENT EXOTROPIA: Status: ACTIVE | Noted: 2021-07-13

## 2023-01-03 PROBLEM — Z97.8 PRESENCE OF INTRATHECAL BACLOFEN PUMP: Status: ACTIVE | Noted: 2022-02-28

## 2023-01-03 PROBLEM — K56.609 SBO (SMALL BOWEL OBSTRUCTION) (H): Status: ACTIVE | Noted: 2022-05-14

## 2023-01-03 PROBLEM — H51.8 INFERIOR OBLIQUE OVERACTION: Status: ACTIVE | Noted: 2021-07-13

## 2023-01-03 PROCEDURE — 99213 OFFICE O/P EST LOW 20 MIN: CPT | Mod: 95 | Performed by: PHYSICIAN ASSISTANT

## 2023-01-03 NOTE — PROGRESS NOTES
Marsha is a 47 year old who is being evaluated via a billable video visit.      How would you like to obtain your AVS? [employer]  If the video visit is dropped, the invitation should be resent by: Text to cell phone: [phone]  Will anyone else be joining your video visit? No        Video-Visit Details    Type of service:  Video Visit   Video Start Time: 9:59 AM  Video End Time:10:12 AM    Originating Location (pt. Location): Home    Distant Location (provider location):  Off-site  Platform used for Video Visit: [employer]

## 2023-01-03 NOTE — PATIENT INSTRUCTIONS
UROLOGY CLINIC VISIT PATIENT INSTRUCTIONS    Continue to catheterize and irrigate your bladder as you are doing.    Follow up in 1 year with Kelly Carrington PA-C with kidney/bladder ultrasound prior.     If you have any issues, questions or concerns in the meantime, do not hesitate to contact us at [phone] or via [employer].     It was a pleasure meeting with you today.  Thank you for allowing me and my team the privilege of caring for you today.  YOU are the reason we are here, and I truly hope we provided you with the excellent service you deserve.  Please let us know if there is anything else we can do for you so that we can be sure you are leaving completely satisfied with your care experience.

## 2023-01-03 NOTE — LETTER
1/3/2023       RE: Marsha Mcneill  [address]  [facility] [address]     Dear Colleague,    Thank you for referring your patient, Marsha Mcneill, to the [facility] UROLOGY CLIN[facility] [place] at [facility]. Please see a copy of my visit note below.    Urology Virtual Visit - Follow Up    Reason for visit: follow up for neurogenic bladder    History: Marsha Mcnelil is a 47 year old female with a history of neurogenic bladder secondary to Multiple Sclerosis.. Patient is wheelchair bound.     Last visit with Dr. Westbrook 2/8/21:  This is a f/u after her recent botox injection. She reports no improvement in leakage per stoma.     Previous Bladder Surgeries:  She is s/p ileocystoplasty with Erik catheterizable channel in Dec 2012. She is s/p ileocystoplasty with Erik catheterizable channel in Dec 2012. Also has an ileostomy.     Current Bladder Medications:  Oxybutynin  Gent instill at night    Current Bladder Management:  The patient catheterizes per Erik stoma into a augmented bladder with a 14F straight catheter q6 hours. Catheterization is performed by  PCA. The patient uses a new catheter each time. She does irrigate the bladder. Irrigation is performed 1 times per day using 240cc of saline/water.     Incontinence History:  Leaking from  Stoma; only from urethra when really full    On that date, she was recommended to increase frequency of CIC to 6 times per day.    TODAY   1/3/23:  Doing well. She is catheterizing 3-4 times per day (her fiance/caregiver catheterizes her). Occasionally feels like the catheter gets stuck every couple of days which resolves with gentle repositioning of the catheter.  Urinary incontinence is minimal and improved.   No UTIs recently, none in the last year. No longer doing nightly gent instillations. Stopped in the nursing home.  No gross hematuria.  Irrigates every day. Moderate amount of sediment/mucous. Improves with PO hydration.    No other concerns.    Exam:  There were no vitals taken for this visit.  GENERAL: Healthy, alert and no distress  EYES: Eyes grossly normal to inspection.  No discharge or erythema, or obvious scleral/conjunctival abnormalities.  RESP: No audible wheeze, cough, or visible cyanosis.  No visible retractions or increased work of breathing.    SKIN: Visible skin clear. No significant rash, abnormal pigmentation or lesions.  NEURO: Cranial nerves grossly intact.  Mentation and speech appropriate for age.  PSYCH: Mentation appears normal, affect normal/bright, judgement and insight intact, normal speech and appearance well-groomed.    Review of Imaging:  EXAM: US RENAL BILATERAL   LOCATION: [facility]   DATE/TIME: 12/28/2022 2:45 PM     FINDINGS:     RIGHT KIDNEY: 8.7 cm. Normal without hydronephrosis or masses.     LEFT KIDNEY: 10.3 cm. Normal without hydronephrosis or masses.     BLADDER: Neurogenic bladder. Mild bladder wall thickening. There is   linear material present with the appearance of some associated   vascularity, which may be artifactual.     IMPRESSION:   1.  Normal appearance of the kidneys.   2.  Neurogenic bladder. There is linear material present with the   appearance of some associated vascularity. The previously questioned   debris within the bladder on prior ultrasound appeared more nodular.   This may represent debris. However, consider correlation with cystoscopy.      CT ABDOMEN PELVIS ENTEROGRAPHY w CONTRAST   EXAM: CT ABDOMEN PELVIS ENTEROGRAPHY w CONTRAST   LOCATION: [facility]   DATE/TIME: 6/14/2022 11:00 AM     FINDINGS:     ENTEROGRAPHY: Unremarkable appearance of the distal esophagus. The   stomach is diffusely distended but tapers to normal caliber by the   duodenum. Few distended to borderline dilated loops of small bowel   are identified, which tapers to normal caliber by the left lower   quadrant ileostomy. The small bowel at the left lower quadrant stoma   is minimally thickened but  otherwise unremarkable in appearance. No   evidence for penetrating disease. No definite focal mesenteric   engorgement, mural stratification, or pseudosacculations.   Postoperative changes involving the cecum.     ADDITIONAL FINDINGS: Right basal scarring and atelectasis. Trace   bilateral pleural effusions. Left lower lobe calcified granuloma.     Given the technique, unremarkable appearance of the liver,   gallbladder, pancreas, spleen, and adrenal glands. Subcentimeter   renal hypodensities are too small to characterize but statistically   are favored to reflect cysts. No collecting system dilatation.   Trabeculated urinary bladder with prior Mitrofanoff procedure exiting   through the right lower quadrant. A few nondependent foci of gas are   identified in the urinary bladder. Indwelling IUD. No large pelvic   mass. No pelvic ascites.     Normal caliber abdominal aorta. Patent portal, splenic, and superior   mesenteric veins. Patent bilateral renal veins. No abdominopelvic   lymphadenopathy.     Marked osteopenia. Mild to moderate osteoarthrosis of the hips.   Multilevel degenerative changes of the spine. Bilateral L5 pars   defects. Battery pack in the lateral right abdominopelvic wall with   the lead/catheter extending into the thoracic spine and out of the   field-of-view.     IMPRESSION:   1.  Few distended to borderline dilated loops of small bowel tapering   to normal caliber by the left lower quadrant stoma. The small bowel   in this region is minimally thickened but otherwise unremarkable in   appearance.   2.  Trace pleural effusions.   3.  Prior Mitrofanoff procedure. The indwelling foci of gas likely   due to instrumentation. However, correlate clinically for possible   urinary tract infection.   4.  Marked osteopenia.      CT ABDOMEN PELVIS wo CONTRAST   EXAM: CT ABDOMEN PELVIS wo CONTRAST   LOCATION: [facility]   DATE/TIME: 5/13/2022 8:20 PM       FINDINGS:   LOWER CHEST: Atelectasis right lower  lobe significantly decreased   since previous examination. Mild bibasilar bronchiectasis. Calcified   granuloma left lower lobe.     HEPATOBILIARY: Probable sludge debris in the gallbladder. No   calcified gallstones or gallbladder wall thickening/inflammatory   changes. Noncontrast views of the liver unremarkable. No bile duct   dilatation.     PANCREAS: Mild pancreatic atrophy.     SPLEEN: Normal.     ADRENAL GLANDS: Normal.     KIDNEYS/BLADDER: Air in the nondependent portion of the bladder may   be due to recent catheterization. The kidneys and ureters are   unremarkable. No hydronephrosis.     BOWEL: Extensive postoperative changes involving the small bowel with   ileostomy in the mid abdomen. Stable malrotation of the small bowel.   There are multiple gas and fluid dilated loops of small bowel in the   right abdomen and upper pelvis. Distal small bowel is decompressed as   is the colon. Findings consistent with mechanical small bowel   obstruction or definite transition point not identified. No free air   or free fluid. Mild haziness in the right abdominal mesentery similar   to previous examination.     LYMPH NODES: Normal.     VASCULATURE: Mild aortic calcification.     PELVIC ORGANS: IUD in the uterus.     MUSCULOSKELETAL: Bilateral pars interarticularis defects at L5 with   mild anterior subluxation L5 on S1. Prominent lumbosacral lordosis.   Pump device right lateral abdomen with leads or tubing extending into   the thecal space of the thoracolumbar spine. Diffuse pelvic muscular   atrophy.     IMPRESSION:   1.  Findings consistent with small bowel obstruction with definite   transition point not confidently identified.   2.  Extensive postoperative changes involving the abdomen and small   bowel with ileostomy midabdomen. Mild haziness in the right abdominal   mesentery stable. No free air or abscess.   3.  Sludge debris in the gallbladder.   4.  Bilateral pars interarticularis defects at L5 with mild  anterior   subluxation L5 on S1 with associated hypertrophic and degenerative   changes. Prominent lumbosacral lordosis. Findings stable.   5.  Stable diffuse muscular atrophy in the pelvis.   6.  Coarse atelectasis right lower lobe decreased.   7.  Air in the nondependent portion of the bladder may be secondary   to recent catheterization.      CT ABDOMEN PELVIS wo CONTRAST   EXAM: CT ABDOMEN PELVIS wo CONTRAST   LOCATION: [facility]   DATE/TIME: 3/21/2022 10:00 AM       FINDINGS:   LOWER CHEST: Right lower lobe bronchiectasis and volume loss. Left   lower lobe granuloma.     HEPATOBILIARY: Normal noncontrast appearance of the liver. No   calcified gallstones.     PANCREAS: No acute peripancreatic inflammatory changes.     SPLEEN: Normal size.     ADRENAL GLANDS: Normal.     KIDNEYS/BLADDER: Tiny nonobstructing calyceal tip stone upper right   kidney. Otherwise, normal noncontrast appearance of the kidneys. No   hydronephrosis. Air in the bladder lumen may be secondary to   instrumentation/catheterization. The right bladder dome is tethered   towards the right lower quadrant.     BOWEL: Previous small bowel resection with right lower quadrant   ileostomy. The small bowel does not cross midline suggesting small   bowel malrotation, although given the extensive small bowel   resection, this appearance may be postoperative. Gastric distention.   The small bowel is diffusely dilated with segments measuring up to   4.7 cm. There is some tapering at the ileostomy, but no definitive   transition point. Mild perienteric edema. No definite bowel wall   pneumatosis. No intraperitoneal free air. The colon is decompressed.   Postoperative changes at the cecum.     LYMPH NODES: No lymphadenopathy.     VASCULATURE: Normal caliber abdominal aorta.     PELVIC ORGANS: IUD. No adnexal mass.     OTHER: No drainable fluid collections in the abdomen or pelvis. No   free air.     MUSCULOSKELETAL: Exaggerated lumbar lordosis. Chronic L5  pars defects   with mild anterolisthesis of L5 on S1. Baclofen pump. Scattered   skeletal degenerative changes.     IMPRESSION:   1.  Dilated loops of small bowel consistent with bowel obstruction.    This tapers at the ileostomy site without discrete transition point.   Mild perienteric edema. No pneumatosis or free air.     2.  The duodenum does not cross midline and all small bowel loops are   in the central or right hemiabdomen compatible with malrotation.   However given previous bowel resection, this appearance may be   postoperative.      US RENAL BILATERAL   EXAM: US RENAL BILATERAL   LOCATION: [facility]   DATE/TIME: 1/22/2021 2:00 PM       FINDINGS:     RIGHT KIDNEY: 11.4 cm. Normal without hydronephrosis or masses.     LEFT KIDNEY: 11.3 cm. Normal without hydronephrosis or masses.     BLADDER: Decompressed with mild bladder wall trabeculation. Dependent   echogenicity is incompletely characterized and could reflect debris   or hemorrhage. Mass/neoplasm not excluded.     IMPRESSION:   1.  Normal kidney ultrasound.   2.  Decompressed bladder with dependent nonspecific echogenic   material/tissue.      Review of Labs:  5/16/22 - serum Cr: 0.33      Assessment & Plan   47 year old female with neurogenic bladder secondary to primary progressive multiple sclerosis s/p ileocystoplasty and Erik channel. Doing well. Her incontinence has improved and is minimal at present. Continues on oxybutynin 30 mg daily, filled by other member of her care team. No UTIs in over a year and no longer doing nightly gentamicin bladder instillations. Recent renal ultrasound is without hydronephrosis. Some possible debris in the bladder which was present on prior ultrasounds as well. Review of at least 3 CT scan reports over the last year indicates no mention of bladder mass. We discussed possible cystoscopy for intravesical examination but it was mutually agreed to defer for now given no gross hematuria, recent UTIs, or evidence  for a concerning mass on CT scans. She will continue her daily bladder irrigation and work to stay well hydrated as good PO water intake leads to a decrease in urinary sediment.    Plan:  -Continue CIC and irrigation regimen.  -Follow up with me in 1 year with renal/bladder ultrasound prior.     Kelly Carrington PA-C  Department of Urology        25 minutes spent on the date of the encounter doing chart review, review of outside records, review of test results, interpretation of tests, patient visit and documentation      Marsha is a 47 year old who is being evaluated via a billable video visit.      How would you like to obtain your AVS? MyChart  If the video visit is dropped, the invitation should be resent by: Text to cell phone: [phone]  Will anyone else be joining your video visit? No        Video-Visit Details    Type of service:  Video Visit   Video Start Time: 9:59 AM  Video End Time:10:12 AM    Originating Location (pt. Location): Home    Distant Location (provider location):  Off-site  Platform used for Video Visit: [email or web address]

## 2023-01-26 ENCOUNTER — TELEPHONE (OUTPATIENT)
Dept: UROLOGY | Facility: CLINIC | Age: 48
End: 2023-01-26
Payer: MEDICARE

## 2023-01-26 NOTE — TELEPHONE ENCOUNTER
----- Message from Tres Rondon sent at 1/25/2023  4:45 PM CST -----  Please send US order to [facility] in [facility] through Jan 2024.    Thanks,    Tres

## 2023-02-14 ENCOUNTER — OFFICE VISIT (OUTPATIENT)
Dept: NEUROLOGY | Facility: CLINIC | Age: 48
End: 2023-02-14
Attending: PSYCHIATRY & NEUROLOGY
Payer: MEDICARE

## 2023-02-14 VITALS — OXYGEN SATURATION: 94 % | HEART RATE: 80 BPM | SYSTOLIC BLOOD PRESSURE: 85 MMHG | DIASTOLIC BLOOD PRESSURE: 60 MMHG

## 2023-02-14 DIAGNOSIS — R46.89 SPELL OF BEHAVIOR CHANGE: ICD-10-CM

## 2023-02-14 DIAGNOSIS — G35 MS (MULTIPLE SCLEROSIS) (H): Primary | ICD-10-CM

## 2023-02-14 DIAGNOSIS — Z51.81 THERAPEUTIC DRUG MONITORING: ICD-10-CM

## 2023-02-14 PROCEDURE — G0463 HOSPITAL OUTPT CLINIC VISIT: HCPCS

## 2023-02-14 PROCEDURE — 99214 OFFICE O/P EST MOD 30 MIN: CPT | Performed by: PSYCHIATRY & NEUROLOGY

## 2023-02-14 PROCEDURE — G0463 HOSPITAL OUTPT CLINIC VISIT: HCPCS | Performed by: PSYCHIATRY & NEUROLOGY

## 2023-02-14 RX ORDER — DULOXETIN HYDROCHLORIDE 20 MG/1
CAPSULE, DELAYED RELEASE ORAL
COMMUNITY
Start: 2021-02-01 | End: 2023-11-19

## 2023-02-14 ASSESSMENT — PAIN SCALES - GENERAL: PAINLEVEL: NO PAIN (0)

## 2023-02-14 NOTE — LETTER
2/14/2023       RE: Marsha Mcneill  [address] [facility] [address]     Dear Colleague,    Thank you for referring your patient, Marsha Mcneill, to the [facility]IPLE [facility]CL[facility] [place] at [facility]. Please see a copy of my visit note below.    Date of Service: 2/14/2023    [facility]   [facility] Clinic Evaluation     Subjective: 47-year-old woman who has a history of juvenile rheumatoid arthritis, anxiety and depression who presents in follow-up for multiple sclerosis.    She reports she is having spells.  These are characterized by her face washing as if she is starting to have an anxiety attack, she will then have difficulty putting words together.  If she is asked a question she will still be able to respond to it, but will be very slow.  She gets associated dizziness and lightheadedness.  She looks a zoned out to her partner.  Symptoms lasted a couple of minutes, but will take a total of 10 minutes to completely recover.  In the recovery time she feels dizzy or out of it.  She still has some difficulty constructing sentences, but not as severe as when she is zoned out.  Previously these were happening sporadically, but over the past 5 months they are happening 2-3 times a month.    She does not report any new symptoms related to multiple sclerosis.  Recall that she has not gotten a dose of rituximab since January 2022.  We are monitoring her B-cell count.    She has moved out of her nursing home.  She is now living with her fiancé.    She continues to have a sacral wound.  This is being monitored by wound care.      Disease onset: age 26 (2001) numbness/weakness all four extremities  Last relapse: age 45, 4/2021, left arm weakness    Progression onset: around age 31    Prior DMDs:   Copaxone [id number], discontinued due to inefficacy  Rebif 2913-2077, discontinued due to personal cost and perceived inefficacy  Rituximab of April 2019 for a  total of 2 doses, discontinued due to change in neurologists  Ocrevus 6/2020-12/2020, discontinued because clinical symptoms while on drug and insurance would not continue  rituximab 12/1/2021 & 1/5/2022      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  Reye Syndrome as a child     Macrobid [Nitrofurantoin Anhydrous] Nausea and Vomiting     Penicillins Nausea and Vomiting     Other reaction(s): *Unknown     Ga      Other reaction(s): GI Upset  N/V       Current Outpatient Medications   Medication     baclofen (GABLOFEN) 80000 MCG/20ML injection     baclofen (LIORESAL) 10 MG tablet     calcium carbonate (OS-LILIBETH 600 MG [place]. [place]) 600 MG tablet     calcium carbonate (TUMS) 500 MG chewable tablet     cholecalciferol (VITAMIN D3) 5000 units (125 mcg) capsule     diazepam (VALIUM) 5 MG tablet     DULoxetine (CYMBALTA) 20 MG capsule     FLUoxetine (PROZAC) 40 MG capsule     ibuprofen (ADVIL/MOTRIN) 600 MG tablet     levonorgestrel (MIRENA) 20 MCG/24HR IUD     lidocaine (XYLOCAINE) 5 % external ointment     loratadine (CLARITIN) 10 MG tablet     nystatin (MYCOSTATIN) 989319 UNIT/GM external cream     oxybutynin ER (DITROPAN XL) 15 MG 24 hr tablet     sodium chloride 0.9%, bottle, 0.9 % irrigation     traZODone (DESYREL) 100 MG tablet     acetaminophen (TYLENOL) 500 MG tablet     calcium carbonate 600 mg-vitamin D 400 units (CALTRATE) 600-400 MG-UNIT per tablet     celecoxib (CELEBREX) 100 MG capsule     diazepam (VALIUM) 5 MG tablet     doxepin (SINEQUAN) 25 MG capsule     FLUoxetine (PROZAC) 20 MG capsule     gabapentin (NEURONTIN) 300 MG capsule     gentamicin (GARAMYCIN) 40 MG/ML injection     HYDROcodone-acetaminophen (NORCO) 5-325 MG tablet     LACTASE ENZYME 3000 units tablet     lidocaine  (XYLOCAINE) 2 % external gel     medroxyPROGESTERone (DEPO-PROVERA) 150 MG/ML injection     Melatonin 3 MG SUBL     morphine (MS CONTIN) 15 MG CR tablet     NEW MED     NONFORMULARY     oxyCODONE (ROXICODONE) 5 MG tablet     pregabalin (LYRICA) 100 MG capsule     pregabalin (LYRICA) 150 MG capsule     silver sulfADIAZINE (SILVADENE) 1 % external cream     SM NASAL DECONGESTANT PE 10 MG TABS     Current Facility-Administered Medications   Medication     Botulinum Toxin Type A (BOTOX) 200 units injection 200 Units        Past medical, surgical, social and family history was personally reviewed. Pertinent details noted above.     Physical Examination:   BP (!) 85/60 (BP Location: Left arm, Patient Position: Sitting, Cuff Size: Adult Regular)   Pulse 80   SpO2 94%     General: no acute distress  Cranial nerves:   VFFC  PERRL w/no RAPD  EOM full w/L NINFA   Face symmetric  Hearing intact  Mild/mod spastic scanning dysarthria  Motor:   Tone is generally reduced aside from LUE where she is spastic, developing contractures    R L  Deltoid  2 3  Biceps  1 4  Triceps 1 4-  Wrist ext 1 4-  Finger ext 1 2  Finger abd 1 1    Hip flexion 0 0  Knee flexion 0 0  Knee ext 0 0  Ankle d/f 0 0    Reflexes: reduced t/o, no ankle clonus  Sensory: vibration is mod reduced in the ankles, mild in the knees  Coordination: no ataxia or dysmetria  Gait: wcb    Tests/Imaging:   JASON virus Ab 0.08 7/2020  Vitamin D 52    10/2022 - b cells 0  igg 525  Abs lymph 1100    MRI brain   12/2019 - overall mild burden no significant posterior fossa lesions, periventricular and juxtacortical lesions noted, gd-, mild atrophy noted for age  3/2021 - no new lesions, gd-   9/2022 - no new lesions, gd-      MRI cervical spine   12/2019 - large lesion extending from c2-c4, gd-, associated atrophy  9/2022 - no new lesions, gd-      MRI thoracic spine   12/2019 - c7-t1 lesion, additiona 3 lesions possibly present but quality is suboptimal, gd-       Assessment:  47-year-old woman with relapsing remitting multiple sclerosis who remains clinically and radiologically stable on rituximab.  We will check her B-cell count now to see if she is due for a dose of rituximab.  We discussed rationale for dose alteration.    Spells of behavior change could be seizures.  We discussed risk of seizures associated with MS.  EEG is advised.  They may also be presyncopal as her significant other said that sometimes they are associated with position changes, though not all of them have been.    We discussed risks for contractures and frozen shoulder.  Routine range of motion activities were encouraged.    Plan:   -Blood work  - Consider another dose of rituximab  - EEG  - Follow-up in 6 months    Note was completed with the assistance of Dragon Fluency software which can often result in accidental word substitutions.     A total of 30 minutes on the date of service were spent in the care of this patient.     Ny Hall MD on 2/14/2023 at 11:57 AM

## 2023-02-14 NOTE — PROGRESS NOTES
Date of Service: 2/14/2023    [facility] Neurology   MS Clinic Evaluation     Subjective: 47-year-old woman who has a history of juvenile rheumatoid arthritis, anxiety and depression who presents in follow-up for multiple sclerosis.    She reports she is having spells.  These are characterized by her face washing as if she is starting to have an anxiety attack, she will then have difficulty putting words together.  If she is asked a question she will still be able to respond to it, but will be very slow.  She gets associated dizziness and lightheadedness.  She looks a zoned out to her partner.  Symptoms lasted a couple of minutes, but will take a total of 10 minutes to completely recover.  In the recovery time she feels dizzy or out of it.  She still has some difficulty constructing sentences, but not as severe as when she is zoned out.  Previously these were happening sporadically, but over the past 5 months they are happening 2-3 times a month.    She does not report any new symptoms related to multiple sclerosis.  Recall that she has not gotten a dose of rituximab since January 2022.  We are monitoring her B-cell count.    She has moved out of her nursing home.  She is now living with her fiancé.    She continues to have a sacral wound.  This is being monitored by wound care.      Disease onset: age 26 (2001) numbness/weakness all four extremities  Last relapse: age 45, 4/2021, left arm weakness    Progression onset: around age 31    Prior DMDs:   Copaxone [id number], discontinued due to inefficacy  Rebif [id number], discontinued due to personal cost and perceived inefficacy  Rituximab of April 2019 for a total of 2 doses, discontinued due to change in neurologists  Ocrevus 6/2020-12/2020, discontinued because clinical symptoms while on drug and insurance would not continue  rituximab 12/1/2021 & 1/5/2022      Allergies   Allergen Reactions     Augmentin Unknown, GI Disturbance and Nausea and Vomiting     vomiting        Mushroom GI Disturbance     vomiting     Povidone Iodine Rash     Amoxicillin Nausea and Vomiting     Aspirin Other (See Comments)     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  Reye Syndrome as a child     Macrobid [Nitrofurantoin Anhydrous] Nausea and Vomiting     Penicillins Nausea and Vomiting     Other reaction(s): *Unknown     Ga      Other reaction(s): GI Upset  N/V       Current Outpatient Medications   Medication     baclofen (GABLOFEN) 15192 MCG/20ML injection     baclofen (LIORESAL) 10 MG tablet     calcium carbonate (OS-LILIBETH 600 MG [place]. [place]) 600 MG tablet     calcium carbonate (TUMS) 500 MG chewable tablet     cholecalciferol (VITAMIN D3) 5000 units (125 mcg) capsule     diazepam (VALIUM) 5 MG tablet     DULoxetine (CYMBALTA) 20 MG capsule     FLUoxetine (PROZAC) 40 MG capsule     ibuprofen (ADVIL/MOTRIN) 600 MG tablet     levonorgestrel (MIRENA) 20 MCG/24HR IUD     lidocaine (XYLOCAINE) 5 % external ointment     loratadine (CLARITIN) 10 MG tablet     nystatin (MYCOSTATIN) 162016 UNIT/GM external cream     oxybutynin ER (DITROPAN XL) 15 MG 24 hr tablet     sodium chloride 0.9%, bottle, 0.9 % irrigation     traZODone (DESYREL) 100 MG tablet     acetaminophen (TYLENOL) 500 MG tablet     calcium carbonate 600 mg-vitamin D 400 units (CALTRATE) 600-400 MG-UNIT per tablet     celecoxib (CELEBREX) 100 MG capsule     diazepam (VALIUM) 5 MG tablet     doxepin (SINEQUAN) 25 MG capsule     FLUoxetine (PROZAC) 20 MG capsule     gabapentin (NEURONTIN) 300 MG capsule     gentamicin (GARAMYCIN) 40 MG/ML injection     HYDROcodone-acetaminophen (NORCO) 5-325 MG tablet     LACTASE ENZYME 3000 units tablet     lidocaine (XYLOCAINE) 2 % external gel     medroxyPROGESTERone (DEPO-PROVERA) 150 MG/ML injection     Melatonin 3 MG SUBL     morphine (MS CONTIN) 15 MG CR tablet     NEW MED     NONFORMULARY     oxyCODONE (ROXICODONE) 5 MG tablet     pregabalin (LYRICA) 100 MG capsule     pregabalin (LYRICA) 150 MG capsule     silver  sulfADIAZINE (SILVADENE) 1 % external cream     SM NASAL DECONGESTANT PE 10 MG TABS     Current Facility-Administered Medications   Medication     Botulinum Toxin Type A (BOTOX) 200 units injection 200 Units        Past medical, surgical, social and family history was personally reviewed. Pertinent details noted above.     Physical Examination:   BP (!) 85/60 (BP Location: Left arm, Patient Position: Sitting, Cuff Size: Adult Regular)   Pulse 80   SpO2 94%     General: no acute distress  Cranial nerves:   VFFC  PERRL w/no RAPD  EOM full w/L NINFA   Face symmetric  Hearing intact  Mild/mod spastic scanning dysarthria  Motor:   Tone is generally reduced aside from LUE where she is spastic, developing contractures    R L  Deltoid  2 3  Biceps  1 4  Triceps 1 4-  Wrist ext 1 4-  Finger ext 1 2  Finger abd 1 1    Hip flexion 0 0  Knee flexion 0 0  Knee ext 0 0  Ankle d/f 0 0    Reflexes: reduced t/o, no ankle clonus  Sensory: vibration is mod reduced in the ankles, mild in the knees  Coordination: no ataxia or dysmetria  Gait: wcb    Tests/Imaging:   JASON virus Ab 0.08 7/2020  Vitamin D 52    10/2022 - b cells 0  igg 525  Abs lymph 1100    MRI brain   12/2019 - overall mild burden no significant posterior fossa lesions, periventricular and juxtacortical lesions noted, gd-, mild atrophy noted for age  3/2021 - no new lesions, gd-   9/2022 - no new lesions, gd-      MRI cervical spine   12/2019 - large lesion extending from c2-c4, gd-, associated atrophy  9/2022 - no new lesions, gd-      MRI thoracic spine   12/2019 - c7-t1 lesion, additiona 3 lesions possibly present but quality is suboptimal, gd-       Assessment: 47-year-old woman with relapsing remitting multiple sclerosis who remains clinically and radiologically stable on rituximab.  We will check her B-cell count now to see if she is due for a dose of rituximab.  We discussed rationale for dose alteration.    Spells of behavior change could be seizures.  We discussed  risk of seizures associated with MS.  EEG is advised.  They may also be presyncopal as her significant other said that sometimes they are associated with position changes, though not all of them have been.    We discussed risks for contractures and frozen shoulder.  Routine range of motion activities were encouraged.    Plan:   -Blood work  - Consider another dose of rituximab  - EEG  - Follow-up in 6 months    Note was completed with the assistance of Dragon Fluency software which can often result in accidental word substitutions.     A total of 30 minutes on the date of service were spent in the care of this patient.   Ny Hall MD on 2/14/2023 at 11:57 AM

## 2023-02-14 NOTE — NURSING NOTE
Chief Complaint   Patient presents with     MS     RECHECK     MS  follow up      Vitals were taken and medications were reconciled.   Logan Estrella, EMT  11:02 AM

## 2023-02-14 NOTE — PATIENT INSTRUCTIONS
Your exam is stable     Blood work to see if it is time to get another dose of rituximab     EEG given spells -- to look for seizure activity   MS does increase your risk of seizure     Stretch out the arms to prevent tightness (frozen) shoulder     Follow up in 6 months

## 2023-02-15 ENCOUNTER — TRANSFERRED RECORDS (OUTPATIENT)
Dept: HEALTH INFORMATION MANAGEMENT | Facility: CLINIC | Age: 48
End: 2023-02-15
Payer: MEDICARE

## 2023-02-17 ENCOUNTER — DOCUMENTATION ONLY (OUTPATIENT)
Dept: NEUROLOGY | Facility: CLINIC | Age: 48
End: 2023-02-17
Payer: MEDICARE

## 2023-02-17 NOTE — PROGRESS NOTES
CD19 lab results received and placed in Dr. Hall's folder for review   Logan Estrella EMT 02/17/2023 2:29PM

## 2023-02-20 NOTE — PROGRESS NOTES
CD20 Lab results received and placed in Dr. Hall's folder for review.   Logan Estrella EMT 02/20/2023 11:37AM

## 2023-02-20 NOTE — PROGRESS NOTES
CBC lab results received and placed in Dr. Hall's folder for review.   Logan Estrella EMT 02/20/2023 9:41AM

## 2023-03-23 ENCOUNTER — TRANSFERRED RECORDS (OUTPATIENT)
Dept: HEALTH INFORMATION MANAGEMENT | Facility: CLINIC | Age: 48
End: 2023-03-23
Payer: MEDICARE

## 2023-03-24 ENCOUNTER — DOCUMENTATION ONLY (OUTPATIENT)
Dept: NEUROLOGY | Facility: CLINIC | Age: 48
End: 2023-03-24
Payer: MEDICARE

## 2023-03-24 NOTE — PROGRESS NOTES
[facility] report received and placed in Dr. Hall's folder fore review.   Logan Estrella EMT 03/24/2023 10:42AM

## 2023-03-27 ENCOUNTER — DOCUMENTATION ONLY (OUTPATIENT)
Dept: NEUROLOGY | Facility: CLINIC | Age: 48
End: 2023-03-27
Payer: MEDICARE

## 2023-03-27 NOTE — PROGRESS NOTES
IgG lab results received from [facility] and placed in Dr. Hall's folder for review.   Logan Estrella EMT 03/27/2023 10:45AM

## 2023-04-04 ENCOUNTER — ANCILLARY PROCEDURE (OUTPATIENT)
Dept: NEUROLOGY | Facility: CLINIC | Age: 48
End: 2023-04-04
Attending: PSYCHIATRY & NEUROLOGY
Payer: MEDICAID

## 2023-04-04 DIAGNOSIS — R46.89 SPELL OF BEHAVIOR CHANGE: ICD-10-CM

## 2023-08-16 ENCOUNTER — DOCUMENTATION ONLY (OUTPATIENT)
Dept: NEUROLOGY | Facility: CLINIC | Age: 48
End: 2023-08-16

## 2023-08-16 ENCOUNTER — OFFICE VISIT (OUTPATIENT)
Dept: NEUROLOGY | Facility: CLINIC | Age: 48
End: 2023-08-16
Attending: PSYCHIATRY & NEUROLOGY
Payer: MEDICARE

## 2023-08-16 VITALS — OXYGEN SATURATION: 98 % | DIASTOLIC BLOOD PRESSURE: 69 MMHG | HEART RATE: 83 BPM | SYSTOLIC BLOOD PRESSURE: 101 MMHG

## 2023-08-16 DIAGNOSIS — R46.89 SPELL OF BEHAVIOR CHANGE: ICD-10-CM

## 2023-08-16 DIAGNOSIS — G35 MS (MULTIPLE SCLEROSIS) (H): Primary | ICD-10-CM

## 2023-08-16 DIAGNOSIS — Z51.81 THERAPEUTIC DRUG MONITORING: ICD-10-CM

## 2023-08-16 DIAGNOSIS — G82.50 QUADRIPARESIS (H): ICD-10-CM

## 2023-08-16 PROCEDURE — G0463 HOSPITAL OUTPT CLINIC VISIT: HCPCS | Performed by: PSYCHIATRY & NEUROLOGY

## 2023-08-16 PROCEDURE — 99214 OFFICE O/P EST MOD 30 MIN: CPT | Performed by: PSYCHIATRY & NEUROLOGY

## 2023-08-16 RX ORDER — TERIPARATIDE 250 UG/ML
INJECTION, SOLUTION SUBCUTANEOUS
COMMUNITY
Start: 2023-08-07

## 2023-08-16 RX ORDER — OXCARBAZEPINE 150 MG/1
TABLET, FILM COATED ORAL
Qty: 120 TABLET | Refills: 5 | Status: SHIPPED | OUTPATIENT
Start: 2023-08-16 | End: 2023-11-19

## 2023-08-16 ASSESSMENT — PAIN SCALES - GENERAL: PAINLEVEL: NO PAIN (0)

## 2023-08-16 NOTE — PROGRESS NOTES
Lab orders have been faxed over to [facility] in [place] at:  Phone [phone]  Fax: [phone]  Logan MCLEAN 08/16/2023 12:09PM

## 2023-08-16 NOTE — PATIENT INSTRUCTIONS
Blood work to make sure you responded to rituximab     Spells of difficulty speaking and muscle spasms   Try trileptal   Let me know if you experience dizziness or sleepiness as a side effect  Start with 1 twice per day, if still having episodes after two weeks, you can increase to two twice per day     No ampyra until we know what the spells are   Ampyra makes seizures worse    Video visit in 3 months

## 2023-08-16 NOTE — LETTER
8/16/2023       RE: Marsha Mcneill  [address] [facility] [address]       Dear Colleague,    Thank you for referring your patient, Marsha Mcneill, to the [facility] MULTIPLE SCL[facility]OSIS CLINIC [place] [facility]. Please see a copy of my visit note below.    Date of Service: 8/16/2023    [facility] Neurology   MS Clinic Evaluation     Subjective: 48-year-old woman who has a history of juvenile rheumatoid arthritis, anxiety and depression who presents in follow-up for multiple sclerosis.    ? Received rituximab 5/10 - I cannot find record of appt   To her recollection she tolerated the infusion     She had questions about trying ampyr a    Still having spells where she has difficulty speaking   Reduced in frequency   About 1 every 1-2 weeks    Having episodic spasms   Extensor arms and leg   More likely in am   Triggered by movement, getting dressed, moving blankets     Does not report discrete new symptoms     Last relapse: age 45, 4/2021, left arm weakness    Progression onset: around age 31    Prior DMDs:   Copaxone [id number], discontinued due to inefficacy  Rebif [id number], discontinued due to personal cost and perceived inefficacy  Rituximab of April 2019 for a total of 2 doses, discontinued due to change in neurologists  Ocrevus 6/2020-12/2020, discontinued because clinical symptoms while on drug and insurance would not continue  rituximab 12/1/2021 & 1/5/2022      Allergies   Allergen Reactions    Amoxicillin-Pot Clavulanate Unknown, GI Disturbance and Nausea and Vomiting     vomiting      Mushroom GI Disturbance     vomiting    Povidone Iodine Rash    Amoxicillin Nausea and Vomiting    Aspirin Other (See Comments)     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  Reye Syndrome as a child    Macrobid [Nitrofurantoin Anhydrous] Nausea and Vomiting    Penicillins Nausea and Vomiting     Other reaction(s): *Unknown    Ga      Other reaction(s): GI Upset  N/V       Current  Outpatient Medications   Medication    baclofen (GABLOFEN) 43482 MCG/20ML injection    baclofen (LIORESAL) 10 MG tablet    calcium carbonate (OS-LILIBETH 600 MG [place]. [place]) 600 MG tablet    cholecalciferol (VITAMIN D3) 5000 units (125 mcg) capsule    diazepam (VALIUM) 5 MG tablet    DULoxetine (CYMBALTA) 20 MG capsule    FORTEO 600 MCG/2.4ML SOPN injection    ibuprofen (ADVIL/MOTRIN) 600 MG tablet    lidocaine (XYLOCAINE) 5 % external ointment    loratadine (CLARITIN) 10 MG tablet    nystatin (MYCOSTATIN) 726034 UNIT/GM external cream    oxybutynin ER (DITROPAN XL) 15 MG 24 hr tablet    traZODone (DESYREL) 100 MG tablet    calcium carbonate (TUMS) 500 MG chewable tablet    FLUoxetine (PROZAC) 40 MG capsule    levonorgestrel (MIRENA) 20 MCG/24HR IUD    sodium chloride 0.9%, bottle, 0.9 % irrigation     Current Facility-Administered Medications   Medication    Botulinum Toxin Type A (BOTOX) 200 units injection 200 Units        Past medical, surgical, social and family history was personally reviewed. Pertinent details noted above.     Physical Examination:   /69 (BP Location: Right arm, Patient Position: Sitting, Cuff Size: Adult Regular)   Pulse 83   SpO2 98%     General: no acute distress      Tests/Imaging:   JASON virus Ab 0.08 7/2020  Vitamin D 52    10/2022 - b cells 0  igg 525  Abs lymph 1100    MRI brain   12/2019 - overall mild burden no significant posterior fossa lesions, periventricular and juxtacortical lesions noted, gd-, mild atrophy noted for age  3/2021 - no new lesions, gd-   9/2022 - no new lesions, gd-      MRI cervical spine   12/2019 - large lesion extending from c2-c4, gd-, associated atrophy  9/2022 - no new lesions, gd-      MRI thoracic spine   12/2019 - c7-t1 lesion, additiona 3 lesions possibly present but quality is suboptimal, gd-       Assessment: 48-year-old woman with relapsing remitting multiple sclerosis who remains clinically and radiologically stable on rituximab. I will reassess b  cell count to ensure response to rituximab     Advised trial of trileptal due to paroxysmal spells. Reviewed common risks and side effects    Plan:   -Blood work  - trileptal 150 bid x 2 weeks, then increase to 300 bid   - Follow-up in 3 months    Note was completed with the assistance of Dragon Fluency software which can often result in accidental word substitutions.     A total of 30 minutes on the date of service were spent in the care of this patient.   Ny Hall MD on 8/16/2023 at 11:19 AM          Again, thank you for allowing me to participate in the care of your patient.      Sincerely,    Ny Hall MD

## 2023-08-16 NOTE — NURSING NOTE
Chief Complaint   Patient presents with    MS    RECHECK     Ms FOLLOW UP      Vitals were taken and medications were reconciled.   Logan Estrella, EMT  11:12 AM

## 2023-08-16 NOTE — PROGRESS NOTES
Date of Service: 8/16/2023    [facility] Neurology   MS Clinic Evaluation     Subjective: 48-year-old woman who has a history of juvenile rheumatoid arthritis, anxiety and depression who presents in follow-up for multiple sclerosis.    ? Received rituximab 5/10 - I cannot find record of appt   To her recollection she tolerated the infusion     She had questions about trying ampyr a    Still having spells where she has difficulty speaking   Reduced in frequency   About 1 every 1-2 weeks    Having episodic spasms   Extensor arms and leg   More likely in am   Triggered by movement, getting dressed, moving blankets     Does not report discrete new symptoms     Last relapse: age 45, 4/2021, left arm weakness    Progression onset: around age 31    Prior DMDs:   Copaxone [id number], discontinued due to inefficacy  Rebif 6595-1559, discontinued due to personal cost and perceived inefficacy  Rituximab of April 2019 for a total of 2 doses, discontinued due to change in neurologists  Ocrevus 6/2020-12/2020, discontinued because clinical symptoms while on drug and insurance would not continue  rituximab 12/1/2021 & 1/5/2022      Allergies   Allergen Reactions    Amoxicillin-Pot Clavulanate Unknown, GI Disturbance and Nausea and Vomiting     vomiting      Mushroom GI Disturbance     vomiting    Povidone Iodine Rash    Amoxicillin Nausea and Vomiting    Aspirin Other (See Comments)     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  Reye Syndrome as a child    Macrobid [Nitrofurantoin Anhydrous] Nausea and Vomiting    Penicillins Nausea and Vomiting     Other reaction(s): *Unknown    Ga      Other reaction(s): GI Upset  N/V       Current Outpatient Medications   Medication    baclofen (GABLOFEN) 37196 MCG/20ML injection    baclofen (LIORESAL) 10 MG tablet    calcium carbonate (OS-LILIBETH 600 MG [place]. [place]) 600 MG tablet    cholecalciferol (VITAMIN D3) 5000 units (125 mcg) capsule    diazepam (VALIUM) 5 MG tablet    DULoxetine (CYMBALTA) 20 MG capsule     FORTEO 600 MCG/2.4ML SOPN injection    ibuprofen (ADVIL/MOTRIN) 600 MG tablet    lidocaine (XYLOCAINE) 5 % external ointment    loratadine (CLARITIN) 10 MG tablet    nystatin (MYCOSTATIN) 870987 UNIT/GM external cream    oxybutynin ER (DITROPAN XL) 15 MG 24 hr tablet    traZODone (DESYREL) 100 MG tablet    calcium carbonate (TUMS) 500 MG chewable tablet    FLUoxetine (PROZAC) 40 MG capsule    levonorgestrel (MIRENA) 20 MCG/24HR IUD    sodium chloride 0.9%, bottle, 0.9 % irrigation     Current Facility-Administered Medications   Medication    Botulinum Toxin Type A (BOTOX) 200 units injection 200 Units        Past medical, surgical, social and family history was personally reviewed. Pertinent details noted above.     Physical Examination:   /69 (BP Location: Right arm, Patient Position: Sitting, Cuff Size: Adult Regular)   Pulse 83   SpO2 98%     General: no acute distress      Tests/Imaging:   JASON virus Ab 0.08 7/2020  Vitamin D 52    10/2022 - b cells 0  igg 525  Abs lymph 1100    MRI brain   12/2019 - overall mild burden no significant posterior fossa lesions, periventricular and juxtacortical lesions noted, gd-, mild atrophy noted for age  3/2021 - no new lesions, gd-   9/2022 - no new lesions, gd-      MRI cervical spine   12/2019 - large lesion extending from c2-c4, gd-, associated atrophy  9/2022 - no new lesions, gd-      MRI thoracic spine   12/2019 - c7-t1 lesion, additiona 3 lesions possibly present but quality is suboptimal, gd-       Assessment: 48-year-old woman with relapsing remitting multiple sclerosis who remains clinically and radiologically stable on rituximab. I will reassess b cell count to ensure response to rituximab     Advised trial of trileptal due to paroxysmal spells. Reviewed common risks and side effects    Plan:   -Blood work  - trileptal 150 bid x 2 weeks, then increase to 300 bid   - Follow-up in 3 months    Note was completed with the assistance of Dragon Fluency software  which can often result in accidental word substitutions.     A total of 30 minutes on the date of service were spent in the care of this patient.   Ny Hall MD on 8/16/2023 at 11:19 AM

## 2023-08-31 ENCOUNTER — DOCUMENTATION ONLY (OUTPATIENT)
Dept: NEUROLOGY | Facility: CLINIC | Age: 48
End: 2023-08-31
Payer: MEDICARE

## 2023-08-31 NOTE — PROGRESS NOTES
Lab results have been received from [facility], results placed in Dr. Hall's folder for review and signature.  Logan Estrella EMT 08/31/2023 8:53AM

## 2023-09-22 ENCOUNTER — DOCUMENTATION ONLY (OUTPATIENT)
Dept: NEUROLOGY | Facility: CLINIC | Age: 48
End: 2023-09-22
Payer: MEDICARE

## 2023-09-22 NOTE — PROGRESS NOTES
Lab reports received from [facility] for Basic Metabolic Panel, report placed in Dr. Hall's folder for review and signature.  Logan Estrella EMT 09/22/2023 11:50AM

## 2023-11-01 ENCOUNTER — TELEPHONE (OUTPATIENT)
Dept: UROLOGY | Facility: CLINIC | Age: 48
End: 2023-11-01
Payer: MEDICARE

## 2023-11-01 DIAGNOSIS — N31.9 NEUROGENIC BLADDER: Primary | ICD-10-CM

## 2023-11-01 NOTE — TELEPHONE ENCOUNTER
[facility] Call Center    Phone Message    May a detailed message be left on voicemail: yes     Reason for Call: Other: Pt is calling to discuss with philipi about her current UTI. Pt finished taking 2 medication, Cefidinir and Cipro, which did not help her.. She had 2 UTI in the last month. Please advise pt. Thank you.       Action Taken: Message routed to:  Other: URO    Travel Screening: Not Applicable

## 2023-11-02 NOTE — CONFIDENTIAL NOTE
Pt called and scheduled for a sooner visit with Kelly Carrington PA-C per provider. Pt states she is still having symptoms. Orders placed for a UA/UC and pt asked to go to a [facility] to drop off a sample.     Kasia ZimmerESTHELA  11/02/23  11:38 AM      -------------------------------------------------------------------------------    S: Pt is calling to discuss with hanna about her current UTI. Pt finished taking 2 medication, Cefidinir and Cipro, which did not help her.. She had 2 UTI in the last month.     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 Doing well. Her incontinence has improved and is minimal at present. Continues on oxybutynin 30 mg daily, filled by other member of her care team. No UTIs in over a year and no longer doing nightly gentamicin bladder instillations. Recent renal ultrasound is without hydronephrosis. Some possible debris in the bladder which was present on prior ultrasounds as well. Review of at least 3 CT scan reports over the last year indicates no mention of bladder mass. We discussed possible cystoscopy for intravesical examination but it was mutually agreed to defer for now given no gross hematuria, recent UTIs, or evidence for a concerning mass on CT scans. She will continue her daily bladder irrigation and work to stay well hydrated as good PO water intake leads to a decrease in urinary sediment.     Plan:  -Continue CIC and irrigation regimen.  -Follow up with me in 1 year with renal/bladder ultrasound prior.     A: Cathing approximately 4 times per day. Pt states she is frequently dehydrated. Pt states her  is irrigating daily.    R: Pt is scheduled with you in January. Do you want any additional tests or change her plan?

## 2023-11-07 ENCOUNTER — PRE VISIT (OUTPATIENT)
Dept: UROLOGY | Facility: CLINIC | Age: 48
End: 2023-11-07
Payer: MEDICARE

## 2023-11-07 DIAGNOSIS — R69 DIAGNOSIS UNKNOWN: Primary | ICD-10-CM

## 2023-11-07 NOTE — TELEPHONE ENCOUNTER
Chart reviewed, no UA/UC results. Pt called, she states she did not have her urine checked on Friday as discussed. She is planning to do the UC the same day as her visit with Kelly Carrington PA-C.     Kasia Zimmer, ESTHELA  11/07/23  12:33 PM

## 2023-11-10 ENCOUNTER — OFFICE VISIT (OUTPATIENT)
Dept: UROLOGY | Facility: CLINIC | Age: 48
End: 2023-11-10
Payer: MEDICARE

## 2023-11-10 ENCOUNTER — LAB (OUTPATIENT)
Dept: LAB | Facility: CLINIC | Age: 48
End: 2023-11-10
Payer: MEDICARE

## 2023-11-10 DIAGNOSIS — N31.9 NEUROGENIC BLADDER: ICD-10-CM

## 2023-11-10 DIAGNOSIS — N31.9 NEUROGENIC BLADDER: Primary | ICD-10-CM

## 2023-11-10 DIAGNOSIS — R32 URINARY INCONTINENCE, UNSPECIFIED TYPE: ICD-10-CM

## 2023-11-10 DIAGNOSIS — N39.0 RECURRENT UTI: ICD-10-CM

## 2023-11-10 LAB
ALBUMIN UR-MCNC: 20 MG/DL
APPEARANCE UR: ABNORMAL
BACTERIA #/AREA URNS HPF: ABNORMAL /HPF
BILIRUB UR QL STRIP: NEGATIVE
COLOR UR AUTO: YELLOW
GLUCOSE UR STRIP-MCNC: NEGATIVE MG/DL
HGB UR QL STRIP: ABNORMAL
KETONES UR STRIP-MCNC: 20 MG/DL
LEUKOCYTE ESTERASE UR QL STRIP: ABNORMAL
NITRATE UR QL: NEGATIVE
PH UR STRIP: 6.5 [PH] (ref 5–7)
RBC #/AREA URNS AUTO: ABNORMAL /HPF
SKIP: ABNORMAL
SP GR UR STRIP: 1.01 (ref 1–1.03)
SQUAMOUS #/AREA URNS AUTO: ABNORMAL /LPF
UROBILINOGEN UR STRIP-MCNC: NORMAL MG/DL
WBC #/AREA URNS AUTO: ABNORMAL /HPF

## 2023-11-10 PROCEDURE — 99214 OFFICE O/P EST MOD 30 MIN: CPT | Performed by: PHYSICIAN ASSISTANT

## 2023-11-10 PROCEDURE — 87186 SC STD MICRODIL/AGAR DIL: CPT

## 2023-11-10 PROCEDURE — 87086 URINE CULTURE/COLONY COUNT: CPT

## 2023-11-10 PROCEDURE — 81001 URINALYSIS AUTO W/SCOPE: CPT

## 2023-11-10 NOTE — PROGRESS NOTES
Marsha Mcneill's goals for this visit include:   Chief Complaint   Patient presents with    RECHECK     Follow up in 1 year with Kelly Carrington PA-C with kidney/bladder ultrasound prior.  Imaging 9/23/23          She requests these members of her care team be copied on today's visit information:     PCP: Cruzito Pardo    Referring Provider:  No referring provider defined for this encounter.    There were no vitals taken for this visit.    Do you need any medication refills at today's visit?     Carie Brian MA on 11/10/2023 at 2:59 PM

## 2023-11-10 NOTE — PROGRESS NOTES
Urology Virtual Visit - Follow Up    Reason for visit: recurrent UTIs    History: Marsha Mcneill is a 48 year old female with a history of neurogenic bladder secondary to Multiple Sclerosis. Patient is wheelchair bound.     Last visit with Dr. Westbrook 2/8/21:  This is a f/u after her recent botox injection. She reports no improvement in leakage per stoma.     Previous Bladder Surgeries:  She is s/p ileocystoplasty with Erik catheterizable channel in Dec 2012. She is s/p ileocystoplasty with Erik catheterizable channel in Dec 2012. Also has an ileostomy.     Current Bladder Medications:  Oxybutynin  Gent instill at night    Current Bladder Management:  The patient catheterizes per Erik stoma into a augmented bladder with a 14F straight catheter q6 hours. Catheterization is performed by  PCA. The patient uses a new catheter each time. She does irrigate the bladder. Irrigation is performed 1 times per day using 240cc of saline/water.     Incontinence History:  Leaking from  Stoma; only from urethra when really full    On that date, she was recommended to increase frequency of CIC to 6 times per day.    Last visit with me   1/3/23:  Doing well. She is catheterizing 3-4 times per day (her fiance/caregiver catheterizes her). Occasionally feels like the catheter gets stuck every couple of days which resolves with gentle repositioning of the catheter.  Urinary incontinence is minimal and improved.   No UTIs recently, none in the last year. No longer doing nightly gent instillations. Stopped in the nursing home.  No gross hematuria.  Irrigates every day. Moderate amount of sediment/mucous. Improves with PO hydration.   No other concerns.    TODAY   11/10/2023:  Marsha is accompanied by her significant other.   Developed new onset UTIs in September, as well as some worsening urinary incontinence per urethra and stoma (previously, she had minimal incontinence only per stoma).   Symptoms with UTI include worsening  "incontinence, cloudy urine, pain with catheterization, and she had a fever with the first UTI in September. She feels that symptoms never really improved after the first round of antibiotics. Her symptoms did improve after the second course of antibiotics. She currently has no UTI symptoms other than mild discomfort with catheterizing, and some persistent urinary incontinence.   States she has been \"trying to catheterize 4 times per day.\" They are irrigating daily with varying amounts of mucous.   She has been having trouble staying hydrated due to issues with her ileostomy. She will be seeing GI in a few weeks.     Exam:  There were no vitals taken for this visit.  GENERAL: Healthy, alert and no distress  EYES: Eyes grossly normal to inspection.  No discharge or erythema, or obvious scleral/conjunctival abnormalities.  RESP: No audible wheeze, cough, or visible cyanosis.  No visible retractions or increased work of breathing.    SKIN: Visible skin clear. No significant rash, abnormal pigmentation or lesions.  NEURO: Cranial nerves grossly intact.  Mentation and speech appropriate for age.  PSYCH: Mentation appears normal, affect normal/bright, judgement and insight intact, normal speech and appearance well-groomed.    Review of Imaging:  EXAM: US RENAL BILATERAL   LOCATION: [facility]   DATE/TIME: 12/28/2022 2:45 PM     FINDINGS:     RIGHT KIDNEY: 8.7 cm. Normal without hydronephrosis or masses.     LEFT KIDNEY: 10.3 cm. Normal without hydronephrosis or masses.     BLADDER: Neurogenic bladder. Mild bladder wall thickening. There is   linear material present with the appearance of some associated   vascularity, which may be artifactual.     IMPRESSION:   1.  Normal appearance of the kidneys.   2.  Neurogenic bladder. There is linear material present with the   appearance of some associated vascularity. The previously questioned   debris within the bladder on prior ultrasound appeared more nodular.   This may " represent debris. However, consider correlation with cystoscopy.      CT ABDOMEN PELVIS ENTEROGRAPHY w CONTRAST   EXAM: CT ABDOMEN PELVIS ENTEROGRAPHY w CONTRAST   LOCATION: [facility]   DATE/TIME: 6/14/2022 11:00 AM     FINDINGS:     ENTEROGRAPHY: Unremarkable appearance of the distal esophagus. The   stomach is diffusely distended but tapers to normal caliber by the   duodenum. Few distended to borderline dilated loops of small bowel   are identified, which tapers to normal caliber by the left lower   quadrant ileostomy. The small bowel at the left lower quadrant stoma   is minimally thickened but otherwise unremarkable in appearance. No   evidence for penetrating disease. No definite focal mesenteric   engorgement, mural stratification, or pseudosacculations.   Postoperative changes involving the cecum.     ADDITIONAL FINDINGS: Right basal scarring and atelectasis. Trace   bilateral pleural effusions. Left lower lobe calcified granuloma.     Given the technique, unremarkable appearance of the liver,   gallbladder, pancreas, spleen, and adrenal glands. Subcentimeter   renal hypodensities are too small to characterize but statistically   are favored to reflect cysts. No collecting system dilatation.   Trabeculated urinary bladder with prior Mitrofanoff procedure exiting   through the right lower quadrant. A few nondependent foci of gas are   identified in the urinary bladder. Indwelling IUD. No large pelvic   mass. No pelvic ascites.     Normal caliber abdominal aorta. Patent portal, splenic, and superior   mesenteric veins. Patent bilateral renal veins. No abdominopelvic   lymphadenopathy.     Marked osteopenia. Mild to moderate osteoarthrosis of the hips.   Multilevel degenerative changes of the spine. Bilateral L5 pars   defects. Battery pack in the lateral right abdominopelvic wall with   the lead/catheter extending into the thoracic spine and out of the   field-of-view.     IMPRESSION:   1.  Few distended to  borderline dilated loops of small bowel tapering   to normal caliber by the left lower quadrant stoma. The small bowel   in this region is minimally thickened but otherwise unremarkable in   appearance.   2.  Trace pleural effusions.   3.  Prior Mitrofanoff procedure. The indwelling foci of gas likely   due to instrumentation. However, correlate clinically for possible   urinary tract infection.   4.  Marked osteopenia.      CT ABDOMEN PELVIS wo CONTRAST   EXAM: CT ABDOMEN PELVIS wo CONTRAST   LOCATION: [facility]   DATE/TIME: 5/13/2022 8:20 PM       FINDINGS:   LOWER CHEST: Atelectasis right lower lobe significantly decreased   since previous examination. Mild bibasilar bronchiectasis. Calcified   granuloma left lower lobe.     HEPATOBILIARY: Probable sludge debris in the gallbladder. No   calcified gallstones or gallbladder wall thickening/inflammatory   changes. Noncontrast views of the liver unremarkable. No bile duct   dilatation.     PANCREAS: Mild pancreatic atrophy.     SPLEEN: Normal.     ADRENAL GLANDS: Normal.     KIDNEYS/BLADDER: Air in the nondependent portion of the bladder may   be due to recent catheterization. The kidneys and ureters are   unremarkable. No hydronephrosis.     BOWEL: Extensive postoperative changes involving the small bowel with   ileostomy in the mid abdomen. Stable malrotation of the small bowel.   There are multiple gas and fluid dilated loops of small bowel in the   right abdomen and upper pelvis. Distal small bowel is decompressed as   is the colon. Findings consistent with mechanical small bowel   obstruction or definite transition point not identified. No free air   or free fluid. Mild haziness in the right abdominal mesentery similar   to previous examination.     LYMPH NODES: Normal.     VASCULATURE: Mild aortic calcification.     PELVIC ORGANS: IUD in the uterus.     MUSCULOSKELETAL: Bilateral pars interarticularis defects at L5 with   mild anterior subluxation L5 on S1.  Prominent lumbosacral lordosis.   Pump device right lateral abdomen with leads or tubing extending into   the thecal space of the thoracolumbar spine. Diffuse pelvic muscular   atrophy.     IMPRESSION:   1.  Findings consistent with small bowel obstruction with definite   transition point not confidently identified.   2.  Extensive postoperative changes involving the abdomen and small   bowel with ileostomy midabdomen. Mild haziness in the right abdominal   mesentery stable. No free air or abscess.   3.  Sludge debris in the gallbladder.   4.  Bilateral pars interarticularis defects at L5 with mild anterior   subluxation L5 on S1 with associated hypertrophic and degenerative   changes. Prominent lumbosacral lordosis. Findings stable.   5.  Stable diffuse muscular atrophy in the pelvis.   6.  Coarse atelectasis right lower lobe decreased.   7.  Air in the nondependent portion of the bladder may be secondary   to recent catheterization.      CT ABDOMEN PELVIS wo CONTRAST   EXAM: CT ABDOMEN PELVIS wo CONTRAST   LOCATION: [facility]   DATE/TIME: 3/21/2022 10:00 AM       FINDINGS:   LOWER CHEST: Right lower lobe bronchiectasis and volume loss. Left   lower lobe granuloma.     HEPATOBILIARY: Normal noncontrast appearance of the liver. No   calcified gallstones.     PANCREAS: No acute peripancreatic inflammatory changes.     SPLEEN: Normal size.     ADRENAL GLANDS: Normal.     KIDNEYS/BLADDER: Tiny nonobstructing calyceal tip stone upper right   kidney. Otherwise, normal noncontrast appearance of the kidneys. No   hydronephrosis. Air in the bladder lumen may be secondary to   instrumentation/catheterization. The right bladder dome is tethered   towards the right lower quadrant.     BOWEL: Previous small bowel resection with right lower quadrant   ileostomy. The small bowel does not cross midline suggesting small   bowel malrotation, although given the extensive small bowel   resection, this appearance may be postoperative.  Gastric distention.   The small bowel is diffusely dilated with segments measuring up to   4.7 cm. There is some tapering at the ileostomy, but no definitive   transition point. Mild perienteric edema. No definite bowel wall   pneumatosis. No intraperitoneal free air. The colon is decompressed.   Postoperative changes at the cecum.     LYMPH NODES: No lymphadenopathy.     VASCULATURE: Normal caliber abdominal aorta.     PELVIC ORGANS: IUD. No adnexal mass.     OTHER: No drainable fluid collections in the abdomen or pelvis. No   free air.     MUSCULOSKELETAL: Exaggerated lumbar lordosis. Chronic L5 pars defects   with mild anterolisthesis of L5 on S1. Baclofen pump. Scattered   skeletal degenerative changes.     IMPRESSION:   1.  Dilated loops of small bowel consistent with bowel obstruction.    This tapers at the ileostomy site without discrete transition point.   Mild perienteric edema. No pneumatosis or free air.     2.  The duodenum does not cross midline and all small bowel loops are   in the central or right hemiabdomen compatible with malrotation.   However given previous bowel resection, this appearance may be   postoperative.      US RENAL BILATERAL   EXAM: US RENAL BILATERAL   LOCATION: [facility]   DATE/TIME: 1/22/2021 2:00 PM       FINDINGS:     RIGHT KIDNEY: 11.4 cm. Normal without hydronephrosis or masses.     LEFT KIDNEY: 11.3 cm. Normal without hydronephrosis or masses.     BLADDER: Decompressed with mild bladder wall trabeculation. Dependent   echogenicity is incompletely characterized and could reflect debris   or hemorrhage. Mass/neoplasm not excluded.     IMPRESSION:   1.  Normal kidney ultrasound.   2.  Decompressed bladder with dependent nonspecific echogenic   material/tissue.      Review of Labs:  10/23/23 - UCx: >100K E coli  9/22/23 - serum Cr: 0.19  9/22/23 - UCx: >100K E coli  5/16/22 - serum Cr: 0.33      Assessment & Plan   48 year old female with neurogenic bladder secondary to primary  progressive multiple sclerosis s/p ileocystoplasty and Erik channel. She received bladder Botox injections in 2021 for urinary incontinence and recurrent UTIs and had been doing well with no UTIs and minimal incontinence until 2 months ago when incontinence worsened (now per urethra and stoma, whereas she only had minimal incontinence per stoma previously) and she developed new onset UTIs (E coli). We discussed possible etiologies for her worsening symptoms. Will plan for the following:    -Increase CIC to q3-4 hours.  -Continue daily bladder irrigation.   -Stay well hydrated.   -Continue oxybutynin 30 mg daily.  -Update renal / bladder ultrasound.  -Same day urodynamics and cystoscopy with Dr. Westbrook or Dr. Manzo next available.   -If the above workup is unrevealing for cause of infections and UTIs persist, consider resuming nightly gentamicin instillations.     Kelly Carrington PA-C  Department of Urology      30 minutes spent on the date of the encounter doing chart review, review of outside records, review of test results, interpretation of tests, patient visit, and documentation

## 2023-11-10 NOTE — PATIENT INSTRUCTIONS
UROLOGY CLINIC VISIT PATIENT INSTRUCTIONS    Schedule same day renal ultrasound, urodynamics with Brigette Garcia CNP, and cystoscopy with Dr. Manzo or Dr. Westbrook on a Monday next available.    Catheterize every 3-4 hours while awake.    Make sure to stay well hydrated.    Continue oxybutynin.    Continue to irrigate your bladder once daily.     URODYNAMIC TESTING    Where should I go for this test?  The procedure is performed at:     [facility]   [address] [place] [address]   Floor 4    If you have questions about your test, please call our nurse triage line at [phone], option #2. If you need to cancel or reschedule your test for any reason, please notify us as soon as possible.    Please check in approximately 15 minutes prior to your procedure time.      What is urodynamic testing?   Urodynamic testing refers to a group of tests used to assess bladder function by measuring various aspects of urine storage and emptying. The test takes about 75 minutes. For most patients, the test is not painful.     How should I get ready for this test?  If you received a bladder diary, please complete this prior to your urodynamic test and bring it with you to your appointment. A bladder diary measures how much fluid you are drinking and how often and how much you are urinating. You can also record any urinary leakage that may have occurred and what you were doing when you leaked.    If you have chronic constipation, please take stool softeners for two days before your test.    What happens during the test?  A nurse will place a very small tube (called a catheter) into your bladder. This drains any urine left over after urinating and also measures the pressures inside of your bladder during your test. Another small catheter will then be placed into your rectum to measure abdominal pressures. Two small sticky patches will be placed on the skin near your anus  to measure pelvic floor function.   We will then instill contrast dye into your bladder through the bladder catheter. The contrast is very dense and will allow us to take x-ray pictures of your bladder intermittently during your test.  You will be asked to tell us when you first start to feel like your bladder is filling up, when you have moderate urgency to urinate, when you have very strong urgency to urinate and finally when you feel that your bladder is full.  You may be asked to cough or bear down several times during your procedure. The provider running your study will be looking for urine leakage during this time.      What happens after the test?  The provider running your urodynamic study will share the results of your test on the day of your procedure or very soon after.  After your test, you may go about your day as normal. You may notice some blood in your urine for a couple of days which should clear up on its own. You may also feel a more urgent need to use the toilet or you may need to go more often - this is due to having a catheter placed and should resolve on its own in a few days.      If you have any issues, questions or concerns in the meantime, do not hesitate to contact us at [phone] or via [employer].     It was a pleasure meeting with you today.  Thank you for allowing me and my team the privilege of caring for you today.  YOU are the reason we are here, and I truly hope we provided you with the excellent service you deserve.  Please let us know if there is anything else we can do for you so that we can be sure you are leaving completely satisfied with your care experience.

## 2023-11-12 LAB — BACTERIA UR CULT: ABNORMAL

## 2023-11-17 ENCOUNTER — VIRTUAL VISIT (OUTPATIENT)
Dept: NEUROLOGY | Facility: CLINIC | Age: 48
End: 2023-11-17
Attending: PSYCHIATRY & NEUROLOGY
Payer: MEDICARE

## 2023-11-17 DIAGNOSIS — Z51.81 THERAPEUTIC DRUG MONITORING: ICD-10-CM

## 2023-11-17 DIAGNOSIS — R46.89 SPELL OF BEHAVIOR CHANGE: ICD-10-CM

## 2023-11-17 DIAGNOSIS — G35 MS (MULTIPLE SCLEROSIS) (H): Primary | ICD-10-CM

## 2023-11-17 PROCEDURE — 99214 OFFICE O/P EST MOD 30 MIN: CPT | Mod: 95 | Performed by: PSYCHIATRY & NEUROLOGY

## 2023-11-17 NOTE — NURSING NOTE
Is the patient currently in the state of [place]? YES    Visit mode:VIDEO    If the visit is dropped, the patient can be reconnected by: TELEPHONE VISIT: Phone number:   Telephone Information:   Mobile [phone]       Will anyone else be joining the visit? NO  (If patient encounters technical issues they should call [phone]    How would you like to obtain your AVS? MyChart    Are changes needed to the allergy or medication list? Pt stated no med changes    Reason for visit: Video Visit (3 month follow-up )    Tessa SANDRA

## 2023-11-17 NOTE — LETTER
11/17/2023       RE: Marsha Mcneill  [address] [facility] [address]       Dear Colleague,    Thank you for referring your patient, Marsha Mcneill, to the [facility] [place] [facility]. Please see a copy of my visit note below.    Date of Service: 11/17/2023    [facility]   MS Clinic Follow-up     Subjective: 47 y/o woman who presents in follow up for MS     Video was not functioning for the patient, so we proceeded with a phone visit.     At her last visit I recommended the addition of trileptal for episodic spasms and episodes of speech arrest. She is now taking two tablets twice per day. She is tolerating this without significant side effects.  She has noted resolution of the episodic spasms and has only had a couple spells of speech arrest over the past few months.  The most recent occurred last week, but before that it was a couple months ago.  She is pleased with the response.     She was noted to have hypokalemia.  Etiology not clear as she does not take any medications that could have provoked this.  Intake is not great.  She is battling bowel obstruction and has an appointment with gastroenterology to address this     She does not report any new symptoms related to MS     Last rituximab was administered in may.  No significant infections.      Allergies   Allergen Reactions    Amoxicillin-Pot Clavulanate Unknown, GI Disturbance and Nausea and Vomiting     vomiting      Mushroom GI Disturbance     vomiting    Povidone Iodine Rash    Amoxicillin Nausea and Vomiting    Aspirin Other (See Comments)     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  Reye Syndrome as a child    Macrobid [Nitrofurantoin Anhydrous] Nausea and Vomiting    Penicillins Nausea and Vomiting     Other reaction(s): *Unknown    Ga      Other reaction(s): GI Upset  N/V       Current Outpatient Medications   Medication    baclofen (GABLOFEN) 77345 MCG/20ML  injection    baclofen (LIORESAL) 10 MG tablet    calcium carbonate (OS-LILIBETH 600 MG [place]) 600 MG tablet    calcium carbonate (TUMS) 500 MG chewable tablet    diazepam (VALIUM) 5 MG tablet    FLUoxetine (PROZAC) 40 MG capsule    FORTEO 600 MCG/2.4ML SOPN injection    ibuprofen (ADVIL/MOTRIN) 600 MG tablet    lidocaine (XYLOCAINE) 5 % external ointment    nystatin (MYCOSTATIN) 909406 UNIT/GM external cream    OXcarbazepine (TRILEPTAL) 150 MG tablet    oxybutynin ER (DITROPAN XL) 15 MG 24 hr tablet    sodium chloride 0.9%, bottle, 0.9 % irrigation    traZODone (DESYREL) 100 MG tablet    cholecalciferol (VITAMIN D3) 5000 units (125 mcg) capsule    DULoxetine (CYMBALTA) 20 MG capsule    levonorgestrel (MIRENA) 20 MCG/24HR IUD    loratadine (CLARITIN) 10 MG tablet     Current Facility-Administered Medications   Medication    Botulinum Toxin Type A (BOTOX) 200 units injection 200 Units        Past medical, surgical, social and family history was personally reviewed. Pertinent details noted above.     Physical Examination:   There were no vitals taken for this visit.    General: no acute distress  Speech is fluent, good responses to questions, tracks conversation well, spastic dysarthria noted    Tests/Imaging:   Labs reviewed in epic    Assessment: 49 y/o woman with chronic MS who seems to be clinically stable on rituximab. We are doing extended interval dosing to minimize infection risk. Last dose administered in May, anticipate 9-12 months dose will be due. Advise starting to check monthly cd19 counts starting in January.  She will plan for an infusion once the b cells are starting to recover.      Spells of speech arrest are likely partial seizures.  They could also be episodic spasms of oropharyngeal muscles.  They have responded well to trileptal.  She will continue with this medication.     We discussed how seizures are a contraindication to use of ampyra. Rationale discussed in detail.  She voiced understanding  and was in agreement that we should not consider ampyra.      We also discussed how she has mild hyponatremia, which can be a side effect of trileptal. However, if her intake is suboptimal, this can also cause low sodium and potassium.     Plan:   - continue trileptal 300 mg bid   - monthly blood work starting in January, first set of labs to include bmp   - follow up in 6 months    Note was completed with the assistance of Dragon Fluency software which can often result in accidental word substitutions.     A total of 30 minutes on the date of service were spent in the care of this patient.   Ny Hall MD on 11/19/2023 at 9:49 AM        Again, thank you for allowing me to participate in the care of your patient.      Sincerely,    Ny Hall MD

## 2023-11-17 NOTE — PROGRESS NOTES
Date of Service: 11/17/2023    [facility] Neurology   MS Clinic Follow-up     Subjective: 47 y/o woman who presents in follow up for MS     Video was not functioning for the patient, so we proceeded with a phone visit.     At her last visit I recommended the addition of trileptal for episodic spasms and episodes of speech arrest. She is now taking two tablets twice per day. She is tolerating this without significant side effects.  She has noted resolution of the episodic spasms and has only had a couple spells of speech arrest over the past few months.  The most recent occurred last week, but before that it was a couple months ago.  She is pleased with the response.     She was noted to have hypokalemia.  Etiology not clear as she does not take any medications that could have provoked this.  Intake is not great.  She is battling bowel obstruction and has an appointment with gastroenterology to address this     She does not report any new symptoms related to MS     Last rituximab was administered in may.  No significant infections.      Allergies   Allergen Reactions     Amoxicillin-Pot Clavulanate Unknown, GI Disturbance and Nausea and Vomiting     vomiting       Mushroom GI Disturbance     vomiting     Povidone Iodine Rash     Amoxicillin Nausea and Vomiting     Aspirin Other (See Comments)     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  Reye Syndrome as a child     Macrobid [Nitrofurantoin Anhydrous] Nausea and Vomiting     Penicillins Nausea and Vomiting     Other reaction(s): *Unknown     Ga      Other reaction(s): GI Upset  N/V       Current Outpatient Medications   Medication     baclofen (GABLOFEN) 44899 MCG/20ML injection     baclofen (LIORESAL) 10 MG tablet     calcium carbonate (OS-LILIBETH 600 MG [place]. [place]) 600 MG tablet     calcium carbonate (TUMS) 500 MG chewable tablet     diazepam (VALIUM) 5 MG tablet     FLUoxetine (PROZAC) 40 MG capsule     FORTEO 600 MCG/2.4ML SOPN injection     ibuprofen (ADVIL/MOTRIN) 600 MG tablet      lidocaine (XYLOCAINE) 5 % external ointment     nystatin (MYCOSTATIN) 832240 UNIT/GM external cream     OXcarbazepine (TRILEPTAL) 150 MG tablet     oxybutynin ER (DITROPAN XL) 15 MG 24 hr tablet     sodium chloride 0.9%, bottle, 0.9 % irrigation     traZODone (DESYREL) 100 MG tablet     cholecalciferol (VITAMIN D3) 5000 units (125 mcg) capsule     DULoxetine (CYMBALTA) 20 MG capsule     levonorgestrel (MIRENA) 20 MCG/24HR IUD     loratadine (CLARITIN) 10 MG tablet     Current Facility-Administered Medications   Medication     Botulinum Toxin Type A (BOTOX) 200 units injection 200 Units        Past medical, surgical, social and family history was personally reviewed. Pertinent details noted above.     Physical Examination:   There were no vitals taken for this visit.    General: no acute distress  Speech is fluent, good responses to questions, tracks conversation well, spastic dysarthria noted    Tests/Imaging:   Labs reviewed in epic    Assessment: 47 y/o woman with chronic MS who seems to be clinically stable on rituximab. We are doing extended interval dosing to minimize infection risk. Last dose administered in May, anticipate 9-12 months dose will be due. Advise starting to check monthly cd19 counts starting in January.  She will plan for an infusion once the b cells are starting to recover.      Spells of speech arrest are likely partial seizures.  They could also be episodic spasms of oropharyngeal muscles.  They have responded well to trileptal.  She will continue with this medication.     We discussed how seizures are a contraindication to use of ampyra. Rationale discussed in detail.  She voiced understanding and was in agreement that we should not consider ampyra.      We also discussed how she has mild hyponatremia, which can be a side effect of trileptal. However, if her intake is suboptimal, this can also cause low sodium and potassium.     Plan:   - continue trileptal 300 mg bid   - monthly blood  work starting in January, first set of labs to include bmp   - follow up in 6 months    Note was completed with the assistance of Dragon Fluency software which can often result in accidental word substitutions.     A total of 30 minutes on the date of service were spent in the care of this patient.   Ny Hall MD on 11/19/2023 at 9:49 AM        Virtual Visit Details    Type of service: phone visit  TT on phone 21 minutes    Originating Location (pt. Location): Home    Distant Location (provider location):  On-site  Platform used for Video Visit: Telephone

## 2023-11-17 NOTE — PATIENT INSTRUCTIONS
Continue trileptal 2 tablets twice per day - notify me if the spells increase in frequency again    I will recheck your sodium in January   Adding one supplement shake every 2 days might help with your sodium and potassium level     B cell count monthly starting in January   Plan for a dose of rituximab as your b cells start to return    Follow up in 6 months

## 2023-11-19 RX ORDER — OXCARBAZEPINE 300 MG/1
300 TABLET, FILM COATED ORAL 2 TIMES DAILY
Qty: 180 TABLET | Refills: 3 | Status: SHIPPED | OUTPATIENT
Start: 2023-11-19 | End: 2024-06-05 | Stop reason: SINTOL

## 2023-11-22 ENCOUNTER — TELEPHONE (OUTPATIENT)
Dept: UROLOGY | Facility: CLINIC | Age: 48
End: 2023-11-22
Payer: MEDICARE

## 2023-11-22 NOTE — TELEPHONE ENCOUNTER
Left voicemail x2 requesting order to be faxed for catheters to [phone].    Ayaka chow Clinic Assistant- Surgical Specialties

## 2023-12-12 NOTE — TELEPHONE ENCOUNTER
[facility] stated that patients orders are good until September of 2024.    [phone]    Closing encounter at this time.     Cecilia Manzo LPN on 12/12/2023 at 10:32 AM

## 2023-12-15 ENCOUNTER — TRANSFERRED RECORDS (OUTPATIENT)
Dept: HEALTH INFORMATION MANAGEMENT | Facility: CLINIC | Age: 48
End: 2023-12-15
Payer: MEDICARE

## 2023-12-26 ENCOUNTER — TELEPHONE (OUTPATIENT)
Dept: UROLOGY | Facility: CLINIC | Age: 48
End: 2023-12-26
Payer: MEDICARE

## 2023-12-26 NOTE — TELEPHONE ENCOUNTER
Requested 12/15/23 CT imaging to be pushed to PACS and report to be faxed to [phone] from [facility].   Imaging request form brought downstairs.    Ayaka cohw Clinic Assistant- Surgical Specialties

## 2023-12-26 NOTE — TELEPHONE ENCOUNTER
CT report received. Placed in stat scanning folder.    Ayaka chow Clinic Assistant- Surgical Specialties

## 2024-01-05 ENCOUNTER — PRE VISIT (OUTPATIENT)
Dept: UROLOGY | Facility: CLINIC | Age: 49
End: 2024-01-05
Payer: MEDICARE

## 2024-01-05 NOTE — TELEPHONE ENCOUNTER
UDS for neurogenic bladder/recurrent UTI's/incontinence.  Records available in [facility].

## 2024-01-10 NOTE — ADDENDUM NOTE
Addended by: AMBER BRUNSON on: 1/13/2020 01:17 PM     Modules accepted: Orders    
Addended by: KAYLA GAITAN on: 1/13/2020 03:18 PM     Modules accepted: Orders, SmartSet    
DISPLAY PLAN FREE TEXT

## 2024-01-11 ENCOUNTER — PRE VISIT (OUTPATIENT)
Dept: UROLOGY | Facility: CLINIC | Age: 49
End: 2024-01-11
Payer: MEDICARE

## 2024-01-11 NOTE — TELEPHONE ENCOUNTER
Reason for visit: Cystoscopy         Relevant information: [place]    Records/imaging/labs/orders: all records available    Pt called: no need for a call    At Rooming: standard prep    Kasia Zimmer CMA  1/11/2024  12:51 PM

## 2024-01-19 ENCOUNTER — TELEPHONE (OUTPATIENT)
Dept: UROLOGY | Facility: CLINIC | Age: 49
End: 2024-01-19
Payer: MEDICARE

## 2024-01-19 NOTE — TELEPHONE ENCOUNTER
Spoke with patient today.  Patient is scheduled for Urodynamics testing with Brigette Garcia CNP on 1/22 and has a history of recurrent UTI's.  Patient also performs CIC several times per day.  Currently, patient is feeling well- no UTI symptoms.  I asked patient to go to an urgent care or ED if she should develop any symptoms over the weekend.  Patient was in agreement.    Juli Joe, CMA

## 2024-01-24 ENCOUNTER — TRANSFERRED RECORDS (OUTPATIENT)
Dept: NEUROLOGY | Facility: CLINIC | Age: 49
End: 2024-01-24
Payer: MEDICARE

## 2024-01-24 LAB
ALT SERPL-CCNC: 7 U/L (ref 7–52)
AST SERPL-CCNC: 10 U/L (ref 13–39)
CREATININE (EXTERNAL): 0.33 MG/DL (ref 0.7–1.3)
GFR ESTIMATED (EXTERNAL): >60 ML/MIN/1.73M2
GLUCOSE (EXTERNAL): 89 MG/DL (ref 70–105)
POTASSIUM (EXTERNAL): 3.9 MMOL/L (ref 3.5–5.1)

## 2024-01-29 ENCOUNTER — DOCUMENTATION ONLY (OUTPATIENT)
Dept: NEUROLOGY | Facility: CLINIC | Age: 49
End: 2024-01-29
Payer: MEDICARE

## 2024-01-29 NOTE — PROGRESS NOTES
Lab results received from [facility] for Abnormal Results [facility], results placed in Dr. Hall's folder for review and signature.   Logan Estrella EMT 01/29/2024 9:54AM

## 2024-02-02 ENCOUNTER — MYC MEDICAL ADVICE (OUTPATIENT)
Dept: NEUROLOGY | Facility: CLINIC | Age: 49
End: 2024-02-02
Payer: MEDICARE

## 2024-03-06 ENCOUNTER — HOSPITAL ENCOUNTER (OUTPATIENT)
Dept: NUCLEAR MEDICINE | Facility: CLINIC | Age: 49
Setting detail: NUCLEAR MEDICINE
Discharge: HOME | End: 2024-03-06
Attending: INTERNAL MEDICINE | Admitting: INTERNAL MEDICINE
Payer: MEDICARE

## 2024-03-06 DIAGNOSIS — R11.2 NAUSEA WITH VOMITING: ICD-10-CM

## 2024-03-06 PROCEDURE — 78264 GASTRIC EMPTYING IMG STUDY: CPT

## 2024-03-06 PROCEDURE — A9541 TC99M SULFUR COLLOID: HCPCS | Performed by: INTERNAL MEDICINE

## 2024-03-06 PROCEDURE — 78264 GASTRIC EMPTYING IMG STUDY: CPT | Mod: 26 | Performed by: RADIOLOGY

## 2024-03-06 PROCEDURE — 343N000001 HC RX 343: Performed by: INTERNAL MEDICINE

## 2024-03-06 RX ADMIN — Medication 2 MILLICURIE: at 08:38

## 2024-03-27 ENCOUNTER — TELEPHONE (OUTPATIENT)
Dept: UROLOGY | Facility: CLINIC | Age: 49
End: 2024-03-27
Payer: MEDICARE

## 2024-03-27 NOTE — TELEPHONE ENCOUNTER
Pt called. She reports increased burning and blood with catheterization, and has been feeling warm.     Pt notified that since she is in [place], and symptomatic she may want to be evaluated locally to expedite care.     Pt expressed understanding.     Kasia Zimmer CMA  03/27/24  11:08 AM

## 2024-03-27 NOTE — TELEPHONE ENCOUNTER
[facility] Call Center    Phone Message    May a detailed message be left on voicemail: yes     Reason for Call: Other: Pt would like a UA done, she thinks she has a UTI , but [place] in [place].Please send order for urology UA to  [facility] in [facility]. Pt is calling back shortly with fax number       Action Taken: Message routed to:  Other: uro    Travel Screening: Not Applicable

## 2024-03-27 NOTE — TELEPHONE ENCOUNTER
[facility] Call Center    Phone Message    May a detailed message be left on voicemail: no     Reason for Call: Other: Patient is calling back to give fax number. [phone].     Action Taken: Message routed to:  [facility] Urology    Travel Screening: Not Applicable

## 2024-03-29 NOTE — TELEPHONE ENCOUNTER
Message left following up on symptoms and if she was able to be seen closer to home.     Pt asked to call back for follow up.    Kasia Zimmer CMA  03/29/24  8:57 AM

## 2024-04-01 ENCOUNTER — TELEPHONE (OUTPATIENT)
Dept: UROLOGY | Facility: CLINIC | Age: 49
End: 2024-04-01
Payer: MEDICARE

## 2024-04-01 NOTE — TELEPHONE ENCOUNTER
[facility] Call Center    Phone Message    May a detailed message be left on voicemail: no     Reason for Call: Other: Patient called to follow up with Kasia. Patient had a test for a UTI and was put on Keflex. Patient just wanted to update the nurse.     Action Taken: Message routed to:  [facility] Urology    Travel Screening: Not Applicable

## 2024-04-02 ENCOUNTER — MYC MEDICAL ADVICE (OUTPATIENT)
Dept: NEUROLOGY | Facility: CLINIC | Age: 49
End: 2024-04-02
Payer: MEDICARE

## 2024-04-02 DIAGNOSIS — G35 MS (MULTIPLE SCLEROSIS) (H): Primary | ICD-10-CM

## 2024-04-02 DIAGNOSIS — R41.3 MEMORY CHANGE: ICD-10-CM

## 2024-04-02 DIAGNOSIS — R46.89 SPELL OF BEHAVIOR CHANGE: ICD-10-CM

## 2024-04-03 NOTE — TELEPHONE ENCOUNTER
"Pt reporting cognitive concerns. States this has been going on for several months. \"Can't remember what I said three seconds ago.\" Also notes \"for example, I have had to rewrite this description a few times because I kept forgetting what I was trying to say.\" Reports difficulty focusing in conversations.     Stopped oxcarbazepine in Feb due to low sodium. Remains on Keppra. Has not yet had repeat labs done, plans to get them drawn this week on Thursday. Also currently being treated for UTI. Routing to Dr Hall for advise.     Rossana Lee RN    "

## 2024-04-04 NOTE — TELEPHONE ENCOUNTER
Pt call returned. Pt states she is feeling better. Pt notified to do a urine culture about a week before her Urodynamics. Pt expressed understanding.      Kasia Zimmer CMA  04/04/24  10:20 AM

## 2024-04-04 NOTE — CONFIDENTIAL NOTE
Lab orders faxed to [facility] (phone [phone], fax [phone]).    Vilma Russell RN

## 2024-04-05 NOTE — CONFIDENTIAL NOTE
Lab orders re-faxed to [facility] (phone [phone], fax [phone]).    Vilma Russell RN

## 2024-04-09 RX ORDER — DIAZEPAM 5 MG
TABLET ORAL
Qty: 3 TABLET | Refills: 0 | Status: SHIPPED | OUTPATIENT
Start: 2024-04-09

## 2024-04-12 ENCOUNTER — TELEPHONE (OUTPATIENT)
Dept: UROLOGY | Facility: CLINIC | Age: 49
End: 2024-04-12
Payer: MEDICARE

## 2024-04-12 NOTE — TELEPHONE ENCOUNTER
[facility] Call Center    Phone Message    May a detailed message be left on voicemail: yes     Reason for Call: Other: Patient wants to lab order  for UA for next Tuesday . Please call pt back for updates. Thanks.    Action Taken: Message routed to:  [facility] Urology    Travel Screening: Not Applicable

## 2024-04-15 NOTE — TELEPHONE ENCOUNTER
Call placed and message left to assess patient symptoms is regard to her lab request for a UA tomorrow. Left my numbe [phone] for her to call me back.    Thank you,  Dior Dodge RN, BSN Urology Triage

## 2024-04-16 ENCOUNTER — LAB (OUTPATIENT)
Dept: LAB | Facility: CLINIC | Age: 49
End: 2024-04-16
Payer: MEDICARE

## 2024-04-16 DIAGNOSIS — G35 MULTIPLE SCLEROSIS (H): ICD-10-CM

## 2024-04-16 LAB
BASOPHILS # BLD AUTO: 0 10E3/UL (ref 0–0.2)
BASOPHILS NFR BLD AUTO: 1 %
EOSINOPHIL # BLD AUTO: 0.1 10E3/UL (ref 0–0.7)
EOSINOPHIL NFR BLD AUTO: 1 %
ERYTHROCYTE [DISTWIDTH] IN BLOOD BY AUTOMATED COUNT: 12.5 % (ref 10–15)
HCT VFR BLD AUTO: 42.7 % (ref 35–47)
HGB BLD-MCNC: 13.5 G/DL (ref 11.7–15.7)
IMM GRANULOCYTES # BLD: 0 10E3/UL
IMM GRANULOCYTES NFR BLD: 0 %
LYMPHOCYTES # BLD AUTO: 1.7 10E3/UL (ref 0.8–5.3)
LYMPHOCYTES NFR BLD AUTO: 29 %
MCH RBC QN AUTO: 28.2 PG (ref 26.5–33)
MCHC RBC AUTO-ENTMCNC: 31.6 G/DL (ref 31.5–36.5)
MCV RBC AUTO: 89 FL (ref 78–100)
MONOCYTES # BLD AUTO: 0.4 10E3/UL (ref 0–1.3)
MONOCYTES NFR BLD AUTO: 7 %
NEUTROPHILS # BLD AUTO: 3.8 10E3/UL (ref 1.6–8.3)
NEUTROPHILS NFR BLD AUTO: 63 %
PLATELET # BLD AUTO: 220 10E3/UL (ref 150–450)
RBC # BLD AUTO: 4.79 10E6/UL (ref 3.8–5.2)
WBC # BLD AUTO: 6.1 10E3/UL (ref 4–11)

## 2024-04-16 PROCEDURE — 86355 B CELLS TOTAL COUNT: CPT

## 2024-04-16 PROCEDURE — 36415 COLL VENOUS BLD VENIPUNCTURE: CPT

## 2024-04-16 PROCEDURE — 85025 COMPLETE CBC W/AUTO DIFF WBC: CPT

## 2024-04-16 PROCEDURE — 80053 COMPREHEN METABOLIC PANEL: CPT

## 2024-04-17 LAB
ALBUMIN SERPL BCG-MCNC: 4.3 G/DL (ref 3.5–5.2)
ALP SERPL-CCNC: 94 U/L (ref 40–150)
ALT SERPL W P-5'-P-CCNC: 17 U/L (ref 0–50)
ANION GAP SERPL CALCULATED.3IONS-SCNC: 13 MMOL/L (ref 7–15)
AST SERPL W P-5'-P-CCNC: 17 U/L (ref 0–45)
BILIRUB SERPL-MCNC: 0.2 MG/DL
BUN SERPL-MCNC: 10.3 MG/DL (ref 6–20)
CALCIUM SERPL-MCNC: 10 MG/DL (ref 8.6–10)
CD19 B CELL COMMENT: ABNORMAL
CD19 CELLS # BLD: <1 CELLS/UL (ref 107–698)
CD19 CELLS NFR BLD: <1 % (ref 6–27)
CHLORIDE SERPL-SCNC: 102 MMOL/L (ref 98–107)
CREAT SERPL-MCNC: 0.26 MG/DL (ref 0.51–0.95)
DEPRECATED HCO3 PLAS-SCNC: 23 MMOL/L (ref 22–29)
EGFRCR SERPLBLD CKD-EPI 2021: >90 ML/MIN/1.73M2
GLUCOSE SERPL-MCNC: 87 MG/DL (ref 70–99)
POTASSIUM SERPL-SCNC: 4.6 MMOL/L (ref 3.4–5.3)
PROT SERPL-MCNC: 6.7 G/DL (ref 6.4–8.3)
SODIUM SERPL-SCNC: 138 MMOL/L (ref 135–145)

## 2024-04-18 RX ORDER — LEVETIRACETAM 750 MG/1
750 TABLET ORAL 2 TIMES DAILY
Qty: 60 TABLET | Refills: 5 | Status: SHIPPED | OUTPATIENT
Start: 2024-04-18

## 2024-04-22 DIAGNOSIS — N39.0 RECURRENT UTI: Primary | ICD-10-CM

## 2024-04-22 NOTE — PROGRESS NOTES
Patient called in today after I left her a message last week. She reports some abdominal pressure, leakage from mitrofanoff site, burning intermittently from that site and some bladder spasms. UA/UC orders placed and number given [phone] for patient to call and make a lab appointment to give a sample. Instructed patient to monitor for fever, intolerable pain in abdomen, back or flanks, confusion, dizziness, lightheadedness and be seen in an UC or ED if she develops these symptoms. Patient voices understanding.    Thank you,  Dior Dodge RN, BSN Urology Triage

## 2024-04-24 ENCOUNTER — LAB (OUTPATIENT)
Dept: LAB | Facility: CLINIC | Age: 49
End: 2024-04-24
Payer: MEDICARE

## 2024-04-24 DIAGNOSIS — N39.0 RECURRENT UTI: Primary | ICD-10-CM

## 2024-04-24 DIAGNOSIS — N39.0 RECURRENT UTI: ICD-10-CM

## 2024-04-24 LAB
ALBUMIN UR-MCNC: NEGATIVE MG/DL
APPEARANCE UR: ABNORMAL
BACTERIA #/AREA URNS HPF: ABNORMAL /HPF
BILIRUB UR QL STRIP: NEGATIVE
COLOR UR AUTO: YELLOW
GLUCOSE UR STRIP-MCNC: NEGATIVE MG/DL
HGB UR QL STRIP: ABNORMAL
KETONES UR STRIP-MCNC: NEGATIVE MG/DL
LEUKOCYTE ESTERASE UR QL STRIP: ABNORMAL
MUCOUS THREADS #/AREA URNS LPF: PRESENT /LPF
NITRATE UR QL: POSITIVE
PH UR STRIP: 5.5 [PH] (ref 5–7)
RBC #/AREA URNS AUTO: ABNORMAL /HPF
SP GR UR STRIP: 1.01 (ref 1–1.03)
SQUAMOUS #/AREA URNS AUTO: ABNORMAL /LPF
UROBILINOGEN UR STRIP-ACNC: 0.2 E.U./DL
WBC #/AREA URNS AUTO: ABNORMAL /HPF
WBC CLUMPS #/AREA URNS HPF: PRESENT /HPF

## 2024-04-24 PROCEDURE — 87186 SC STD MICRODIL/AGAR DIL: CPT

## 2024-04-24 PROCEDURE — 87086 URINE CULTURE/COLONY COUNT: CPT

## 2024-04-24 PROCEDURE — 81001 URINALYSIS AUTO W/SCOPE: CPT

## 2024-04-24 RX ORDER — SULFAMETHOXAZOLE/TRIMETHOPRIM 800-160 MG
1 TABLET ORAL 2 TIMES DAILY
Qty: 14 TABLET | Refills: 0 | Status: SHIPPED | OUTPATIENT
Start: 2024-04-24 | End: 2024-05-01

## 2024-04-24 NOTE — RESULT ENCOUNTER NOTE
Call placed and message left to let patient know that I ordered Bactrim DS po BID for 7 days and sent it to her pharmacy of choice.    Thank you,  Dior Dodge RN, BSN Urology Triage   .

## 2024-04-25 LAB — BACTERIA UR CULT: ABNORMAL

## 2024-04-26 ENCOUNTER — PATIENT OUTREACH (OUTPATIENT)
Dept: UROLOGY | Facility: CLINIC | Age: 49
End: 2024-04-26
Payer: MEDICARE

## 2024-04-26 DIAGNOSIS — N39.0 RECURRENT UTI: Primary | ICD-10-CM

## 2024-04-26 RX ORDER — CEPHALEXIN 500 MG/1
500 CAPSULE ORAL 2 TIMES DAILY
Qty: 10 CAPSULE | Refills: 0 | Status: SHIPPED | OUTPATIENT
Start: 2024-04-26 | End: 2024-05-01

## 2024-04-26 NOTE — PROGRESS NOTES
Call placed to patient to let her know that she needs anew antibiotic as her urine culture shows a resistance to Bactrim. Keflex ordered and patient made aware.    Thank you,  Dior Dodge RN, BSN Urology Triage

## 2024-04-26 NOTE — RESULT ENCOUNTER NOTE
Message left for patient to inform her of antibiotic change. Orders placed for Keflex 500 mg po BID for 5 days and sent to patient's pharmacy of choice.

## 2024-05-03 ENCOUNTER — TELEPHONE (OUTPATIENT)
Dept: NEUROLOGY | Facility: CLINIC | Age: 49
End: 2024-05-03
Payer: MEDICARE

## 2024-05-03 NOTE — TELEPHONE ENCOUNTER
[facility] Call Center    Phone Message    May a detailed message be left on voicemail: yes     Reason for Call: Order(s): Other:   Reason for requested: MRI Brain w & w/o contrast   Date needed: asap, for scheduling  Provider name: Dr. Rafael Larson is requesting her MRI order to be sent to [facility] in [place]. Their phone number is [phone].    Marsha is also requesting a call to inform her that this order has been sent.    Action Taken: Message routed to:  [facility]  MS    Travel Screening: Not Applicable

## 2024-05-06 NOTE — TELEPHONE ENCOUNTER
Spoke with Marsha, who confirmed that she would like to do the brain MRI that is currently scheduled for 5/29 with [facility] instead. Will send order.    Rossana Lee RN

## 2024-05-14 NOTE — TELEPHONE ENCOUNTER
Report for brain MRI has been received from [facility], report placed in Dr. Hall's folder for review and signature.   Logan Estrella EMT May 14, 2024

## 2024-05-15 NOTE — TELEPHONE ENCOUNTER
Images requested from [facility]. They will push to [place] PACS.    Rossana Lee RN

## 2024-06-05 ENCOUNTER — VIRTUAL VISIT (OUTPATIENT)
Dept: NEUROLOGY | Facility: CLINIC | Age: 49
End: 2024-06-05
Attending: PSYCHIATRY & NEUROLOGY
Payer: MEDICARE

## 2024-06-05 DIAGNOSIS — G35 MS (MULTIPLE SCLEROSIS) (H): Primary | ICD-10-CM

## 2024-06-05 DIAGNOSIS — R41.3 MEMORY CHANGE: ICD-10-CM

## 2024-06-05 DIAGNOSIS — Z51.81 THERAPEUTIC DRUG MONITORING: ICD-10-CM

## 2024-06-05 DIAGNOSIS — F33.0 MILD EPISODE OF RECURRENT MAJOR DEPRESSIVE DISORDER (H): ICD-10-CM

## 2024-06-05 DIAGNOSIS — R46.89 SPELL OF BEHAVIOR CHANGE: ICD-10-CM

## 2024-06-05 PROCEDURE — 99214 OFFICE O/P EST MOD 30 MIN: CPT | Mod: 95 | Performed by: PSYCHIATRY & NEUROLOGY

## 2024-06-05 PROCEDURE — G2211 COMPLEX E/M VISIT ADD ON: HCPCS | Mod: 95 | Performed by: PSYCHIATRY & NEUROLOGY

## 2024-06-05 RX ORDER — FLUOXETINE 40 MG/1
40 CAPSULE ORAL DAILY
Qty: 30 CAPSULE | Refills: 11 | Status: SHIPPED | OUTPATIENT
Start: 2024-06-05

## 2024-06-05 ASSESSMENT — PAIN SCALES - GENERAL: PAINLEVEL: NO PAIN (0)

## 2024-06-05 NOTE — PROGRESS NOTES
"Date of Service: 6/5/2024    [facility] Neurology   MS Clinic Follow-up     Subjective: 48-year-old woman who presents in follow-up for multiple sclerosis.    This visit was scheduled in an expedited fashion.  She has concerns about her memory.  She reports that memory has declined over the past few months.  She notes that she will have conversations and have no recollection of the conversation ever taking place.  She has had a couple of events where she was little confused, started crying, and had difficulty recognizing her significant other.  She has difficulty completing sentences.    She continues to have spells of speech arrest.  She has undergone an EEG and evaluation of these events, though there was no evidence of epileptic activity.  I elected to put her on an antiepileptic to help with paroxysmal symptoms that could be related to multiple sclerosis, though acknowledges seizure may not be entirely ruled out.  She developed hyponatremia with oxcarbazepine, so she is now on Keppra.  She states that the spells of speech arrest occur 3 times per week.  She does believe that the medication has been helpful.    She does use marijuana, though limits use to approximately 1 time per month.  She uses it to help with pain associated with her chronic wound.    She does admit to that her mood is a bit depressed.  She describes her depression as not being \"off the charts.\"  She finds herself to be sluggish mentally.  She has a chronic pressure wound that limits her activities and requires her to be bedbound for the majority of the day.  She notes that battling this wound has affected her mood.  In reviewing her chart I see that she takes fluoxetine for mood.  The dose was reduced several months ago.  I do not see a clear reason why the dosage was reduced.    Her last dose of rituximab was administered in May 2023.  She has had 1 hospitalization for fungemia related to her port.    Allergies   Allergen Reactions    " Amoxicillin-Pot Clavulanate Unknown, GI Disturbance and Nausea and Vomiting     vomiting      Mushroom GI Disturbance     vomiting    Povidone Iodine Rash    Amoxicillin Nausea and Vomiting    Aspirin Other (See Comments)     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  Reye Syndrome as a child    Macrobid [Nitrofurantoin Anhydrous] Nausea and Vomiting    Penicillins Nausea and Vomiting     Other reaction(s): *Unknown    Ga      Other reaction(s): GI Upset  N/V       Current Outpatient Medications   Medication Sig Dispense Refill    FLUoxetine (PROZAC) 40 MG capsule Take 1 capsule (40 mg) by mouth daily 30 capsule 11    baclofen (GABLOFEN) 12679 MCG/20ML injection As of February 3, 2020   Drug: Baclofen 2000 mcg/mL   Rate (simple continuous rate): 459.5 mcg/day   Low reservoir alarm date: 4/16/20  Managed by Dr. Martinez      baclofen (LIORESAL) 10 MG tablet Take 10 mg by mouth as needed       calcium carbonate (OS-LILIBETH 600 MG [place]. [place]) 600 MG tablet Take 600 mg by mouth At Bedtime       calcium carbonate (TUMS) 500 MG chewable tablet Take 1-2 chew tab by mouth daily as needed for heartburn      diazepam (VALIUM) 5 MG tablet Take 1-2 tablets 30 minutes before MRI, 3rd tablet if needed. No driving for 8 hours after taking. 3 tablet 0    diazepam (VALIUM) 5 MG tablet Take 1-2 tablets 30 minutes before MRI, 3rd tablet if needed. No driving for 8 hours after taking. 3 tablet 0    FORTEO 600 MCG/2.4ML SOPN injection INJECT 20 MCG (0.08ML) SUBCUTANEOUSLY DAILY      ibuprofen (ADVIL/MOTRIN) 600 MG tablet Take 1 tablet (600 mg) by mouth every 6 hours as needed for moderate pain 12 tablet 0    levETIRAcetam (KEPPRA) 750 MG tablet Take 1 tablet (750 mg) by mouth 2 times daily 60 tablet 5    levonorgestrel (MIRENA) 20 MCG/24HR IUD 1 Intra Uterine Device by Intrauterine route      lidocaine (XYLOCAINE) 5 % external ointment APPLY TOPICALLY TO AFFECTED AREA(S) THREE TIMES DAILY AS NEEDED FOR PAIN.      nystatin (MYCOSTATIN) 459109 UNIT/GM  external cream 2 times daily as needed       OXcarbazepine (TRILEPTAL) 300 MG tablet Take 1 tablet (300 mg) by mouth 2 times daily 180 tablet 3    oxybutynin ER (DITROPAN XL) 15 MG 24 hr tablet Take 2 tablets (30 mg) by mouth daily 60 tablet 3    sodium chloride 0.9%, bottle, 0.9 % irrigation       traZODone (DESYREL) 100 MG tablet TAKE 1 TAB BY MOUTH AT BEDTIME  99     Current Facility-Administered Medications   Medication Dose Route Frequency Provider Last Rate Last Admin    Botulinum Toxin Type A (BOTOX) 200 units injection 200 Units  200 Units Intramuscular Once Timmy Shukla MD            Past medical, surgical, social and family history was personally reviewed. Pertinent details noted above.     Physical Examination:   There were no vitals taken for this visit.    General: no acute distress  She is awake and alert, speech is fluent and prosodic, she answers questions appropriately    Tests/Imaging:   MRI brain May 2024 was personally reviewed and negative for any evidence of new or active lesions    Sodium 138 on April 16, 2024    Assessment: 48-year-old woman with chronic multiple sclerosis, quadriparetic with right hemiplegia and paraplegia.    She is reporting a decline in memory.  I do have concerns that ongoing depression may be contributing to her memory impairment.  I have recommended increasing the dose of fluoxetine.  However, if the symptoms persist it would be reasonable for her to undergo a neuropsychologic evaluation.    She continues to have spells of speech arrest.  I would like her to be evaluated by epilepsy to determine if additional evaluation with prolonged EEG is reasonable for spell characterization.    Plan:   -Continue Keppra  - CD19 count to be done in 1 month  - Neuropsychologic evaluation  - Epilepsy evaluation  - Increase fluoxetine to 40 mg daily  - Follow-up in 4 months    Note was completed with the assistance of Dragon Fluency software which can often result in accidental  word substitutions.     A total of 35 minutes on the date of service were spent in the care of this patient.     The longitudinal plan of care for the diagnosis(es)/condition(s) as documented were addressed during this visit. Due to the added complexity in care, I will continue to support Marsha in the subsequent management and with ongoing continuity of care.    Ny Hall MD on 6/5/2024 at 1:35 PM        Virtual Visit Details    Type of service:  Video Visit     Originating Location (pt. Location): Home    Distant Location (provider location):  On-site  Platform used for Video Visit: Angelo

## 2024-06-05 NOTE — LETTER
"6/5/2024       RE: Marsha Mcneill  [address] [facility] [address]       Dear Colleague,    Thank you for referring your patient, Marsha Mcneill, to the [facility] MULTIPLE [facility]OSIS CLINIC [place] at [facility]. Please see a copy of my visit note below.    Date of Service: 6/5/2024    [facility] Neurology   MS Clinic Follow-up     Subjective: 48-year-old woman who presents in follow-up for multiple sclerosis.    This visit was scheduled in an expedited fashion.  She has concerns about her memory.  She reports that memory has declined over the past few months.  She notes that she will have conversations and have no recollection of the conversation ever taking place.  She has had a couple of events where she was little confused, started crying, and had difficulty recognizing her significant other.  She has difficulty completing sentences.    She continues to have spells of speech arrest.  She has undergone an EEG and evaluation of these events, though there was no evidence of epileptic activity.  I elected to put her on an antiepileptic to help with paroxysmal symptoms that could be related to multiple sclerosis, though acknowledges seizure may not be entirely ruled out.  She developed hyponatremia with oxcarbazepine, so she is now on Keppra.  She states that the spells of speech arrest occur 3 times per week.  She does believe that the medication has been helpful.    She does use marijuana, though limits use to approximately 1 time per month.  She uses it to help with pain associated with her chronic wound.    She does admit to that her mood is a bit depressed.  She describes her depression as not being \"off the charts.\"  She finds herself to be sluggish mentally.  She has a chronic pressure wound that limits her activities and requires her to be bedbound for the majority of the day.  She notes that battling this wound has affected her mood.  In reviewing her " chart I see that she takes fluoxetine for mood.  The dose was reduced several months ago.  I do not see a clear reason why the dosage was reduced.    Her last dose of rituximab was administered in May 2023.  She has had 1 hospitalization for fungemia related to her port.    Allergies   Allergen Reactions    Amoxicillin-Pot Clavulanate Unknown, GI Disturbance and Nausea and Vomiting     vomiting      Mushroom GI Disturbance     vomiting    Povidone Iodine Rash    Amoxicillin Nausea and Vomiting    Aspirin Other (See Comments)     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  Reye Syndrome as a child    Macrobid [Nitrofurantoin Anhydrous] Nausea and Vomiting    Penicillins Nausea and Vomiting     Other reaction(s): *Unknown    Maitaerik      Other reaction(s): GI Upset  N/V       Current Outpatient Medications   Medication Sig Dispense Refill    FLUoxetine (PROZAC) 40 MG capsule Take 1 capsule (40 mg) by mouth daily 30 capsule 11    baclofen (GABLOFEN) 19343 MCG/20ML injection As of February 3, 2020   Drug: Baclofen 2000 mcg/mL   Rate (simple continuous rate): 459.5 mcg/day   Low reservoir alarm date: 4/16/20  Managed by Dr. Martinez      baclofen (LIORESAL) 10 MG tablet Take 10 mg by mouth as needed       calcium carbonate (OS-LILIBETH 600 MG [place]) 600 MG tablet Take 600 mg by mouth At Bedtime       calcium carbonate (TUMS) 500 MG chewable tablet Take 1-2 chew tab by mouth daily as needed for heartburn      diazepam (VALIUM) 5 MG tablet Take 1-2 tablets 30 minutes before MRI, 3rd tablet if needed. No driving for 8 hours after taking. 3 tablet 0    diazepam (VALIUM) 5 MG tablet Take 1-2 tablets 30 minutes before MRI, 3rd tablet if needed. No driving for 8 hours after taking. 3 tablet 0    FORTEO 600 MCG/2.4ML SOPN injection INJECT 20 MCG (0.08ML) SUBCUTANEOUSLY DAILY      ibuprofen (ADVIL/MOTRIN) 600 MG tablet Take 1 tablet (600 mg) by mouth every 6 hours as needed for moderate pain 12 tablet 0    levETIRAcetam (KEPPRA) 750 MG tablet Take 1  tablet (750 mg) by mouth 2 times daily 60 tablet 5    levonorgestrel (MIRENA) 20 MCG/24HR IUD 1 Intra Uterine Device by Intrauterine route      lidocaine (XYLOCAINE) 5 % external ointment APPLY TOPICALLY TO AFFECTED AREA(S) THREE TIMES DAILY AS NEEDED FOR PAIN.      nystatin (MYCOSTATIN) 999404 UNIT/GM external cream 2 times daily as needed       OXcarbazepine (TRILEPTAL) 300 MG tablet Take 1 tablet (300 mg) by mouth 2 times daily 180 tablet 3    oxybutynin ER (DITROPAN XL) 15 MG 24 hr tablet Take 2 tablets (30 mg) by mouth daily 60 tablet 3    sodium chloride 0.9%, bottle, 0.9 % irrigation       traZODone (DESYREL) 100 MG tablet TAKE 1 TAB BY MOUTH AT BEDTIME  99     Current Facility-Administered Medications   Medication Dose Route Frequency Provider Last Rate Last Admin    Botulinum Toxin Type A (BOTOX) 200 units injection 200 Units  200 Units Intramuscular Once Timmy Shukla MD            Past medical, surgical, social and family history was personally reviewed. Pertinent details noted above.     Physical Examination:   There were no vitals taken for this visit.    General: no acute distress  She is awake and alert, speech is fluent and prosodic, she answers questions appropriately    Tests/Imaging:   MRI brain May 2024 was personally reviewed and negative for any evidence of new or active lesions    Sodium 138 on April 16, 2024    Assessment: 48-year-old woman with chronic multiple sclerosis, quadriparetic with right hemiplegia and paraplegia.    She is reporting a decline in memory.  I do have concerns that ongoing depression may be contributing to her memory impairment.  I have recommended increasing the dose of fluoxetine.  However, if the symptoms persist it would be reasonable for her to undergo a neuropsychologic evaluation.    She continues to have spells of speech arrest.  I would like her to be evaluated by epilepsy to determine if additional evaluation with prolonged EEG is reasonable for spell  characterization.    Plan:   -Continue Keppra  - CD19 count to be done in 1 month  - Neuropsychologic evaluation  - Epilepsy evaluation  - Increase fluoxetine to 40 mg daily  - Follow-up in 4 months    Note was completed with the assistance of Dragon Fluency software which can often result in accidental word substitutions.     A total of 35 minutes on the date of service were spent in the care of this patient.     The longitudinal plan of care for the diagnosis(es)/condition(s) as documented were addressed during this visit. Due to the added complexity in care, I will continue to support Marsha in the subsequent management and with ongoing continuity of care.    Ny Hall MD on 6/5/2024 at 1:35 PM        Again, thank you for allowing me to participate in the care of your patient.      Sincerely,    Ny Hall MD

## 2024-06-05 NOTE — NURSING NOTE
Is the patient currently in the state of [place]? YES    Visit mode:VIDEO    If the visit is dropped, the patient can be reconnected by: VIDEO VISIT: Text to cell phone:   Telephone Information:   Mobile [phone]       Will anyone else be joining the visit? NO  (If patient encounters technical issues they should call [phone]    How would you like to obtain your AVS? MyChart    Are changes needed to the allergy or medication list? No    Are refills needed on medications prescribed by this physician? NO    Reason for visit: Follow Up    Kathy SANDRA

## 2024-06-05 NOTE — PATIENT INSTRUCTIONS
Memory concerns  Ongoing depression could be contributing  - Increase fluoxetine back to 40 mg daily  Characterize nature of memory loss  - Schedule an neuropsychologic evaluation    Ongoing spells of speech arrest  Please visit with an epilepsy specialist to determine if additional testing is worthwhile    Multiple sclerosis  - CD19/B-cell count to be done in 1 month  - Rituximab once B cells are recovering    Follow-up with me in 4 months

## 2024-06-06 ENCOUNTER — TELEPHONE (OUTPATIENT)
Dept: NEUROLOGY | Facility: CLINIC | Age: 49
End: 2024-06-06

## 2024-06-06 NOTE — TELEPHONE ENCOUNTER
Pt to have CD19 B cell count checked in early July. Order faxed to [facility] at [phone]. Will send pt a reminder in late June.     Rossana Lee RN

## 2024-06-14 ENCOUNTER — PRE VISIT (OUTPATIENT)
Dept: UROLOGY | Facility: CLINIC | Age: 49
End: 2024-06-14
Payer: MEDICARE

## 2024-06-14 NOTE — TELEPHONE ENCOUNTER
Reason for visit: cystoscopy (UDS prior)    Relevant information: hx of recurrent UTIs    Records/imaging/labs/orders: all labs scheduled    Pt called: No need for a call    At Rooming: collect urine + dip prior to procedure    Toshia Todd  6/14/2024  1:17 PM

## 2024-06-28 NOTE — TELEPHONE ENCOUNTER
Called Marsha to remind her of B cell count due in early July. Lab order sent to [facility] on 6/6. Marsha is going to be at the [facility] on Monday 7/1 and plans to instead have the lab draw done while she is here. Provided scheduling number for lab appt.     Rossana Lee RN

## 2024-07-10 ENCOUNTER — PATIENT OUTREACH (OUTPATIENT)
Dept: UROLOGY | Facility: CLINIC | Age: 49
End: 2024-07-10
Payer: MEDICARE

## 2024-07-12 NOTE — TELEPHONE ENCOUNTER
Lab results received from [facility] for CD20 & B-Cell Count, results placed in Dr. Hall's folder for review and signature.   Logan Estrella EMT July 12, 2024

## 2024-10-15 ENCOUNTER — TELEPHONE (OUTPATIENT)
Dept: UROLOGY | Facility: CLINIC | Age: 49
End: 2024-10-15
Payer: MEDICARE

## 2024-10-15 ENCOUNTER — PRE VISIT (OUTPATIENT)
Dept: UROLOGY | Facility: CLINIC | Age: 49
End: 2024-10-15
Payer: MEDICARE

## 2024-10-15 DIAGNOSIS — N39.0 RECURRENT UTI: Primary | ICD-10-CM

## 2024-10-15 NOTE — TELEPHONE ENCOUNTER
Reason for visit: UDS    Study scheduled because: rUTIs, incontinence    Relevant information: pt is in a wheelchair, needs demetrio lift. Suprapubic 14 fr catheterization (unclear in chart whether CIC)    Records/imaging/labs/orders: all records available    UA/UC ordered: yes    Toshia Todd  10/15/2024  6:50 AM

## 2024-10-15 NOTE — TELEPHONE ENCOUNTER
Patient called in LVM asking for a call back to reschedule with Shy Westbrook and Radha. Writer called patient back and LVM. Mily Hidalgo on 10/15/2024 at 3:37 PM

## 2024-10-16 NOTE — TELEPHONE ENCOUNTER
Received voicemail from patient on 10/16 regarding scheduling surgery with Dr. Sanon.  Routing encounter to appropriate team to follow up with patient.     Mily Hidalgo, on 10/16/2024  P: [phone]

## 2024-10-17 NOTE — TELEPHONE ENCOUNTER
Left detailed voice message instructing patient to call me back directly at [phone] for rescheduling.

## 2024-10-21 ENCOUNTER — TELEPHONE (OUTPATIENT)
Dept: UROLOGY | Facility: CLINIC | Age: 49
End: 2024-10-21

## 2024-10-21 NOTE — TELEPHONE ENCOUNTER
.Writer called pt to discuss scheduling a urinalysis prior to their upcoming Urodynamics appointment.    Pt did  the phone, writer didn't leave a voicemail for the pt.     Pt states that she will still be in the hospital during her appointment and needs to reschedule. Writer routed the information to RN in charge of [facility].    Pt states understanding of all discussed during the call.     Toshia Todd, EMT

## 2024-10-22 NOTE — TELEPHONE ENCOUNTER
Spoke with Patient; rescheduled to next available 2/17, renal US to be completed prior. Transferred patient to imaging

## 2024-11-04 ENCOUNTER — DOCUMENTATION ONLY (OUTPATIENT)
Dept: NEUROLOGY | Facility: CLINIC | Age: 49
End: 2024-11-04
Payer: MEDICARE

## 2024-11-04 NOTE — PROGRESS NOTES
Called patient to schedule overdue MS follow up with Dr. Hall, no answer left voicemail with clinic number [phone] to call back and schedule.   Logan Estrella EMT November 4, 2024

## 2024-12-03 ENCOUNTER — MYC MEDICAL ADVICE (OUTPATIENT)
Dept: ORTHOPEDICS | Facility: CLINIC | Age: 49
End: 2024-12-03
Payer: MEDICARE

## 2024-12-03 ENCOUNTER — TELEPHONE (OUTPATIENT)
Dept: UROLOGY | Facility: CLINIC | Age: 49
End: 2024-12-03
Payer: MEDICARE

## 2024-12-03 NOTE — TELEPHONE ENCOUNTER
Patient called surgery scheduling line stating she received a call to reschedule her clinic appointment with Dr. Westbrook. Routing back to clinic.    Joycelyn Nolen on 12/3/2024 at 3:25 PM

## 2024-12-03 NOTE — TELEPHONE ENCOUNTER
Left Voicemail (1st Attempt) and Sent Mychart (1st Attempt) for the patient to call back and schedule the following:    Appointment type: Cystocopy  Provider: Dr Hernandez  Return date: 2/17/25

## 2024-12-04 NOTE — TELEPHONE ENCOUNTER
Spoke with patient.     Was unable to schedule same day due to [facility] being out during time frame. Patient is okay coming next week for cystoscopy

## 2024-12-10 ENCOUNTER — DOCUMENTATION ONLY (OUTPATIENT)
Dept: UROLOGY | Facility: CLINIC | Age: 49
End: 2024-12-10
Payer: MEDICARE

## 2024-12-10 DIAGNOSIS — N31.9 NEUROGENIC BLADDER: Primary | ICD-10-CM

## 2024-12-10 NOTE — PROGRESS NOTES
Call placed to patient. She needs: 14 Fr, Coloplast intermittent catheters, male length 3/day  1 box of 200 packets of lubricant/month. Order placed and will have faxed to [facility].      Thank you,  Dior Dodge RN, BSN   Urology Triage Nurse

## 2024-12-16 DIAGNOSIS — N31.9 NEUROGENIC BLADDER: Primary | ICD-10-CM

## 2025-01-11 ENCOUNTER — HEALTH MAINTENANCE LETTER (OUTPATIENT)
Age: 50
End: 2025-01-11

## 2025-01-15 ENCOUNTER — TELEPHONE (OUTPATIENT)
Dept: NEUROLOGY | Facility: CLINIC | Age: 50
End: 2025-01-15
Payer: MEDICARE

## 2025-01-15 DIAGNOSIS — M62.838 MUSCLE SPASM: Primary | ICD-10-CM

## 2025-01-15 RX ORDER — DIAZEPAM 5 MG/1
5 TABLET ORAL DAILY PRN
Qty: 15 TABLET | Refills: 1 | Status: SHIPPED | OUTPATIENT
Start: 2025-01-15

## 2025-01-15 NOTE — TELEPHONE ENCOUNTER
Chart reviewed. Dr Hall has only prescribed diazepam for MRI. Called Marsha to discuss. Marsha reports that she has been taking diazepam 5 mg PRN for muscle spasms, which I do see listed in [facility] records. Marsha reports that she takes this sparingly, maybe twice per month. Wondering if Dr Hall would be willing to prescribe this, and if so would like rx sent to [facility] in [place].     Rossana Lee RN

## 2025-01-15 NOTE — TELEPHONE ENCOUNTER
[facility] Call Center    Phone Message    May a detailed message be left on voicemail: yes     Reason for Call: Medication Refill Request    Has the patient contacted the pharmacy for the refill? Yes   Name of medication being requested: diazepam (VALIUM) 5 MG tablet  Provider who prescribed the medication: Dr. Hall  Pharmacy: [address] [address]  Phone [phone]  Date medication is needed: 1/15/25    Pt states she is currently traveling and lost her medication. Pt is requesting that a prescription be sent to the pharmacy listed above and is requesting it be done today if possible as Pt does not have any medication at this time.    Please contact Pt with questions at [phone]    Action Taken: Message routed to:  [facility] Neurology     Travel Screening: Not Applicable

## 2025-02-25 ENCOUNTER — OFFICE VISIT (OUTPATIENT)
Dept: NEUROLOGY | Facility: CLINIC | Age: 50
End: 2025-02-25
Attending: PSYCHIATRY & NEUROLOGY
Payer: MEDICARE

## 2025-02-25 VITALS
TEMPERATURE: 97.5 F | WEIGHT: 141 LBS | HEIGHT: 67 IN | SYSTOLIC BLOOD PRESSURE: 80 MMHG | HEART RATE: 80 BPM | DIASTOLIC BLOOD PRESSURE: 58 MMHG | OXYGEN SATURATION: 98 % | BODY MASS INDEX: 22.13 KG/M2

## 2025-02-25 DIAGNOSIS — G35 MS (MULTIPLE SCLEROSIS) (H): ICD-10-CM

## 2025-02-25 DIAGNOSIS — R46.89 SPELL OF BEHAVIOR CHANGE: Primary | ICD-10-CM

## 2025-02-25 NOTE — PROGRESS NOTES
"[facility] Epilepsy Care History and Physical       Patient:  Marsha Mcneill  :  1975   Age:  49 year old   Today's Office Visit:  2025  Chief Complaint: New patient consultation for spells of speech arrest    Referring Provider:    Jessica Alfaro MD  [address] [place] [address]    History of Present Illness:  Ms. Mcneill is a 48 yo right handed (now writing left handed due to triplegia) female with multiple sclerosis, quadriparesis with right hemiplegia and paraplegia, presenting for evaluation of spells of speech arrest.   Ms. Mcneill presents by herself. She states her spells began around 1.5 years ago. She will have speech arrest, where she can hear and see around her, she can move and feels should would be able to perform an action if asked such as pointing to the ceiling, but cannot speak. She will be staring off. She describes these as \"zoning spells,\" though denies altered awareness. When they first began they were a couple of times per week. They last up to five minutes she feels, but in reality, 15 to 20 minutes may have passed. She states she is confused during and after the events. She has to be reminded of what is happening at that time. She has had times where she is unaware of where she is when they are over. This last  spell occurred 3 weeks ago, with the frequency now occurring every 3 weeks. She is not sure if these improved with antiseizure medication, though chart review and the patient described frequency has improved since they began. There has never been any injuries associated with the spells.  They do not occur any specific time of day.   She will have weekly spells where she has a brief wave of nausea and will feel like her brain \"short circuits,\" lasting seconds, beginning around the same time as the \"zoning out\" spells. This occurs once per week. There is no altered awareness. People around her do not know these are occurring.   She initially started " oxcarbazepine around 8/2023 per the chart, but had a low sodium down to 126 on dosing of 300-300 on 12/14/2023. She was transitioned to Keppra in 12/2023 for this reason.   She denies any side effect on Keppra 750-750.   She denies any abnormal movements, muscle jerking, generalized convulsions, or status epilepticus.    Epilepsy Risk Factors:  Normal birth and delivery. Normal development. Denies febrile convulsions. Denies meningitis, encephalitis, strokes, tumors.  Has history of MS (diagnosed around 2004). She did not require special education classes growing up. No significant head trauma, physical or sexual abuse. No family history of seizures.   Precipitating factors:   NONE  Past Medical History:   Diagnosis Date    Anxiety     Chronic pain     Depression     History of DVT (deep vein thrombosis)     Ileostomy status (H)     Insomnia     MRSA (methicillin resistant staph aureus) culture positive     Multiple scleroses     Neurological disorders     Pressure sore     Urinary tract infection    Triplegia related to multiple sclerosis, neurogenic bladder   Past Surgical History:   Procedure Laterality Date    BLADDER AUGMENTATION  12/7/2012    Procedure: BLADDER AUGMENTATION;  APPENDECTOMY, BLADDER AUGMENTATION, HUMBERTO BLADDER STOMA;  Surgeon: Errol Westbrook MD;  Location: [facility] OR    CYSTOSCOPY, INJECT BOTOX N/A 1/8/2021    Procedure: CYSTOSCOPY, WITH BOTULINUM TOXIN INJECTION;  Surgeon: Errol Westbrook MD;  Location: [facility] OR    ILEOSTOMY  2018    INSERT PUMP BACLOFEN  2009    2 revisions    IRRIGATION AND DEBRIDEMENT DECUBITUS WOUND, COMBINED  09/2019    LAPAROTOMY EXPLORATORY  12/20/2012    Procedure: LAPAROTOMY EXPLORATORY;  Removal of Inter Abdominal Drain;  Surgeon: Errol Westbrook MD;  Location: [facility] OR    LAPAROTOMY EXPLORATORY      ileostomy fro diversion of obstipation    MITROFANOFF PROCEDURE (APPENDIX CONDUIT)  12/7/2012    Procedure: MITROFANOFF PROCEDURE (APPENDIX CONDUIT);;   Surgeon: Errol Westbrook MD;  Location: [facility] OR    SALPINGECTOMY Bilateral 2/26/2020    Procedure: Laparotomy, removal of pelvic mass, both fallopian tubes,;  Surgeon: Tommie Barron MD;  Location: [facility] OR   Wound debridement and flap surgery 11/2024   Family History   Problem Relation Age of Onset    Cancer Mother         non hodgkin's lymphoma     Diabetes Mother     Diabetes Maternal Grandfather     Cardiovascular Maternal Grandfather     Cardiovascular Father    No family history of seizures   Social History     Socioeconomic History    Marital status: Single   Tobacco Use    Smoking status: Never    Smokeless tobacco: Never   Substance and Sexual Activity    Alcohol use: No    Drug use: No     Social Drivers of Health     Financial Resource Strain: Medium Risk (11/24/2024)    Received from [facility]    Overall Financial Resource Strain (CARDIA)     Difficulty of Paying Living Expenses: Somewhat hard   Food Insecurity: No Food Insecurity (11/24/2024)    [facility]    Hunger Vital Sign     Worried About Running Out of Food in the Last Year: Never true     Ran Out of Food in the Last Year: Never true   Transportation Needs: No Transportation Needs (11/24/2024)    [facility]    PRAPARE - Transportation     Lack of Transportation (Medical): No     Lack of Transportation (Non-Medical): No   Physical Activity: Patient Declined (11/24/2024)    Received from [employer] [facility]    Exercise Vital Sign     Days of Exercise per Week: Patient declined     Minutes of Exercise per Session: Patient declined   Stress: Stress Concern Present (11/24/2024)    Received from [employer] [facility]    [employer] - Occupational Stress Questionnaire     Feeling of Stress : To some extent   Social Connections: Unknown (11/24/2024)    Received from [facility]    Social Connection and Isolation Panel [NHANES]     Frequency of Communication with Friends and Family: Three times a week     Frequency of Social Gatherings with Friends and Family: Once a week     Attends Mormonism Services: Patient declined     Active Member of Clubs or Organizations: No     Attends Club or Organization Meetings: Never     Marital Status: Living with partner   Interpersonal Safety: Not At Risk (9/27/2024)    [facility]    Intimate Partner Violence     Are you in a relationship where you are physically hurt, threatened and/or made to feel afraid?: No   Housing Stability: Low Risk  (11/24/2024)    Received from [facility]    Housing Stability Vital Sign     Unable to Pay for Housing in the Last Year: No     Number of Times Moved in the Last Year: 0     Homeless in the Last Year: No      Employment/School:  Highest level of education is a bachelors in journalism and communications. She works as an , with two hours of work per month.   She was born in [place], [place] and grew up in [place], [place]. She splits time between [place] and [place]. She lives with her . No children.   Alcohol/tobacco/illicit substances: Denies all.   Driving:  Currently patient is:  Not driving.  Female:   Reproductive History: No pregnancies  Hx of Salpinectomy. Has an IUD for 10+ years.     Previous Evaluations for Epilepsy:   EEG awake and asleep 4/4/2023: IMPRESSION: Normal in wakefulness and sleep. No epileptiform discharges or seizures.   3 hour video EEG 2/25/2025: Pending  MRI of Brain wwo 5/13/2024 [facility]:   FINDINGS:   INTRACRANIAL CONTENTS: No acute or subacute infarct. No mass, acute   hemorrhage, or extra-axial fluid collections. Stable small scattered   foci of increased FLAIR signal in the periventricular white matter,   left basal ganglia, right ventral viv, and corpus callosum. No new   focus of signal abnormality  identified. Normal ventricles and sulci.   Normal position of the cerebellar tonsils. No pathologic contrast   enhancement.   SELLA: No abnormality accounting for technique.   OSSEOUS STRUCTURES/SOFT TISSUES: Normal marrow signal. The major   intracranial vascular flow voids are maintained.   ORBITS: No abnormality accounting for technique.   SINUSES/MASTOIDS: No paranasal sinus mucosal disease. No middle ear   or mastoid effusion.   Other: Less well evaluated is likely signal abnormality in the upper   cervical cord with probable atrophy, unchanged.     IMPRESSION:   1. Unchanged burden of demyelinating plaques consistent with the   patient's diagnosis of multiple sclerosis.   2.  No enhancing plaques or other findings to suggest active   demyelination.   3. No acute intracranial process.     MRI cervical spine wwo 9/1/2022 [facility]:  FINDINGS:   Satisfactory vertebral body height and alignment. Mild cervical   thoracic curve as before. Redemonstration of multifocal areas of very   subtle abnormal nonenhancing intramedullary T2 hyperintense signal   abnormality within the cervical cord and at the medullary level   series 5 image 8-11. These are stable in size, signal/enhancement   characteristics and distribution overall from 12/12/2019 and remains   compatible with patient's provided clinical diagnosis of   demyelination/MS. The lack of corresponding enhancement suggests   these represent nonactive phase plaques of demyelination with no new   or progressive disease evident. No mass or adenopathy noted within   the neck soft tissues. Satisfactory appearance to the thyroid. No   pathologic enhancement is present. No acute process in the posterior   fossa with full description on current brain MRI 09/01/2022 provided   separately. Satisfactory cervical prevertebral soft tissues. Airways   patent. No evidence for a marrow-infiltrative process. Degenerative   spondylosis at mid cervical levels as below.      Craniovertebral junction and C1-C2: Degenerative changes without   canal stenosis.   C2-C3: Mild interspace narrowing and disc desiccation without disc   herniation. No spinal canal compromise or foraminal narrowing with   mild facet joint arthropathy bilaterally as before.   C3-C4: Mild loss of disc height with annular bulge. No disc   herniation. No spinal stenosis or foraminal compromise with left   facet joint joint arthropathy.   C4-C5: Mild loss of disc height with generalized disc bulge   asymmetric to the left. Superimposed broad-based left foraminal disc   protrusion with osteophytes results in severe left foraminal   compromise and moderate spinal stenosis. No right foraminal   narrowing. Mild facet joint arthropathy is present. Stable appearance   from previous.   C5-C6: Mild loss of disc height with annular bulge. Mild to moderate   bilateral foraminal narrowing left more than right. No disc   herniation. Mild spinal stenosis. Facet joints are normal. Stable   appearance from prior.   C6-C7: Disc desiccation and annular bulge. No disc herniation. Mild   foraminal narrowing bilaterally without spinal stenosis. Left facet   joint arthropathy. No change from previous.   C7-T1: Normal disc height. No herniation. Normal facets. No spinal   canal or neural foraminal stenosis.     IMPRESSION:   1. Overall stable appearance from 12/12/2019 as above.   2.  Multifocal areas of nonspecific nonenhancing T2/IR hyperintensity   abnormality involving the cervical, upper thoracic cord, and at the   medullary level remains compatible with patient's provided clinical   diagnosis of demyelination/MS.   3.  No significant change in the size, number, distribution or   signal/enhancement characteristics of these lesions from previous   evaluation. Nothing to suggest progressive disease/demyelination or   active phase lesions.   4.  Lesions on today's examination should represent stable and   nonactive phase/quiescent  plaques of demyelination.   5.  Stable degenerative changes elsewhere the cervical spine as above   most marked at C4-C5 where there is severe left foraminal compromise   and may correlate to left C5 radicular symptoms.     Review of Systems:  Lethargy / Tiredness:  No  Nausea / Vomiting:  No  Double Vision:  Endorses within the last 4-5 years, with last eye exam 3 years ago, planning to have this updated.   Sleepiness:  No  Depression:  depression and anxiety are under control  Slowed Cognitive Function:  Yes  Memory Problems:  Yes - working with Dr. Hall, scheduled for neuropsychometric testing.  Poor Balance:  Uses a [place]  Dizziness:  No  Appetite Changes:  No  Blurred Vision:  No  Skin: negative  Respiratory: No shortness of breath  Cardiovascular: negative  Have you experienced a traumatic fall related to your events: No  Are these falls related to your seizures: No    Current Outpatient Medications   Medication Sig Dispense Refill    baclofen (GABLOFEN) 25181 MCG/20ML injection As of February 3, 2020   Drug: Baclofen 2000 mcg/mL   Rate (simple continuous rate): 459.5 mcg/day   Low reservoir alarm date: 4/16/20  Managed by Dr. Martinez      baclofen (LIORESAL) 10 MG tablet Take 10 mg by mouth as needed       diazepam (VALIUM) 5 MG tablet Take 1 tablet (5 mg) by mouth daily as needed for muscle spasms. 15 tablet 1    DOMPERIDONE 10 MG TAB/CAP Take by mouth 3 times daily.      FLUoxetine (PROZAC) 40 MG capsule Take 1 capsule (40 mg) by mouth daily 30 capsule 11    FORTEO 600 MCG/2.4ML SOPN injection INJECT 20 MCG (0.08ML) SUBCUTANEOUSLY DAILY      ibuprofen (ADVIL/MOTRIN) 600 MG tablet Take 1 tablet (600 mg) by mouth every 6 hours as needed for moderate pain 12 tablet 0    levETIRAcetam (KEPPRA) 750 MG tablet Take 1 tablet (750 mg) by mouth 2 times daily. 60 tablet 5    lidocaine (XYLOCAINE) 5 % external ointment APPLY TOPICALLY TO AFFECTED AREA(S) THREE TIMES DAILY AS NEEDED FOR PAIN.      nystatin  "(MYCOSTATIN) 928222 UNIT/GM external cream 2 times daily as needed       oxybutynin ER (DITROPAN XL) 15 MG 24 hr tablet Take 2 tablets (30 mg) by mouth daily 60 tablet 3    sodium chloride 0.9%, bottle, 0.9 % irrigation       traZODone (DESYREL) 100 MG tablet TAKE 1 TAB BY MOUTH AT BEDTIME  99    calcium carbonate (TUMS) 500 MG chewable tablet Take 1-2 chew tab by mouth daily as needed for heartburn      diazepam (VALIUM) 5 MG tablet Take 1-2 tablets 30 minutes before MRI, 3rd tablet if needed. No driving for 8 hours after taking. 3 tablet 0    diazepam (VALIUM) 5 MG tablet Take 1-2 tablets 30 minutes before MRI, 3rd tablet if needed. No driving for 8 hours after taking. 3 tablet 0    levonorgestrel (MIRENA) 20 MCG/24HR IUD 1 Intra Uterine Device by Intrauterine route         Perceived AED Side Effects: No    Medication Notes: dosing is around 3PM and 1AM  AED Medication Compliance:  compliant most of the time  Using a pill box:  No - she has a system she uses    Past AEDs:      2/25/2025    12:36 PM   AED - ANTIEPILEPTIC DRUGS   levETIRAcetam 750 mg BID PO          Medication marked as long-term   Oxcarbazepine 300-300 caused hyponatremia    Exam:    BP (!) 80/58   Pulse 80   Temp 97.5  F (36.4  C) (Temporal)   Ht 5' 7\" (170.2 cm)   Wt 141 lb (64 kg)   SpO2 98%   BMI 22.08 kg/m       Wt Readings from Last 5 Encounters:   02/25/25 141 lb (64 kg)   01/08/21 170 lb (77.1 kg)   11/04/20 166 lb (75.3 kg)   02/28/20 153 lb 1.6 oz (69.4 kg)   02/03/20 155 lb (70.3 kg)     EXAM:   Vitals:    02/25/25 1142   BP: (!) 80/58   Pulse: 80   Temp: 97.5  F (36.4  C)   TempSrc: Temporal   SpO2: 98%   Weight: 141 lb (64 kg)   Height: 5' 7\" (170.2 cm)     General: General examination reveals a female in no acute distress  Cardiovascular: RRR, no rubs, gallops, or murmurs. No carotid bruits heard bilaterally  Pulmonary: Clear to auscultation bilaterally. No adventitious breath sounds.    Neurological Examination:    Mental " Status: Alert and awake. Mood and affect were appropriate to situation. Memory appeared intact, not formally tested. Language without dysarthria.  Cranial Nerves:  II, III, IV, VI: Undilated fundoscopic exam not completed. Visual fields full to confrontation. PERRL. EOMI without nystagmus.  V: Normal facial sensation and strength of muscles of mastication.  VII: Facial movements are symmetric and without weakness.  VIII: Hearing equal to finger rub bilaterally.  IX/X: Palate elevation symmetric.  XI: Sternocleidomastoid and trapezius are normal and without weakness.  XII: Tongue is midline.  Musculoskeletal: Neck supple. Head laterally resting to the left.  Motor: Tone spastic in R>L upper extremities and decreased in bilateral lower extremities. Strength 5/5 in left shoulder abduction, 4+/5 with elbow flexion and extension, 4-/5 with finger abduction and  strength. Pronator drift is negative on left.  Reflexes:   Trace in left upper and absent in right upper and bilateral lower extremities.   Sensation: Sensation to vibration is intact in upper and lower extremities, slightly decreased on the left compared to the right lower extremity.   Coordination: Unable to fully extend left arm, able to touch nose. Unable to fully extend fingers. Index finger to thumb tap slowed but intact.   Station and Gait: Presents in a power wheelchair, does not ambulate/transfer.     Assessment and Plan:   Ms. Mcneill has history of multiple sclerosis, quadriparesis with right hemiplegia and paraplegia, presenting for evaluation of recurrent spells of speech arrest. She reports the ability to see, hear, and move her body, but not speak during these events. She also reports they may last longer than she is aware, indicating she may lose awareness during part of the event. She also has recurrent spells of nausea that last seconds with an associated odd sensation with maintained awareness. Since her events began and antiseizure  medications were started, frequency has decreased. She is tolerating Keppra 750-750 without side effects. Routine EEG was normal. 3 hour video EEG from today is pending. The question was raised whether she would benefit from prolonged EEG monitoring for characterization of her spells.    She reports no spells during her EEG today. We reviewed the possibility of prolonged EEG monitoring for her events for characterization provided her interictal EEG is normal. We discussed the pros and cons of both inpatient video EEG monitoring and stratus ambulatory EEG monitoring as she lives out of town. Given the frequency of her events currently, inpatient video EEG monitoring may be more beneficial as the Keppra could be weaned to potentially precipitate an event, which we would not do with at home monitoring. She is interested in prolonged monitoring if appropriate based on the results of today's EEG to confirm whether ongoing AED therapy is necessary, or whether Keppra should be further optimized.     She does not drive, requires a demetrio lift for transfers, and fortunately has not suffered any injuries from her current events. It is possible if she is having more frequent events than she is aware or potentially subclinical events that they could be contributing to her cognitive concerns.    At this time she will remain on her current dosing of Keppra unchanged. Refills are currently up to date. An order for a Keppra level has been placed, which she will do closer to home as this needs to be completed through her port.     She will continue with Dr. Hall for management of her multiple sclerosis.     I will reach out to Ms. Mcneill once her EEG results from today are available to determine the next steps. She was advised to contact the clinic sooner with questions, concerns, or worsening symptoms.    Thank you for letting me participate in your patient's care.      I spent 72 minutes in total today to provide comprehensive  medical care; Face to face time: 48 minutes  I spent 13 minutes writing the note and placing orders.   I spent 11 minutes reviewing the chart.     The rest of the time was spent with the patient in face to face interview. During this time key medical decisions were made with review of medical chart prior to visit, visit with patient, counseling/education, and post visit work, including documentation of note on the day of visit. I addressed all questions the patient/caregiver raised in regards to epilepsy or related medical questions.

## 2025-02-25 NOTE — PATIENT INSTRUCTIONS
Continue current dosing of Keppra at this time. Refills are up to day    We will check a Keppra level locally at your convenience    Once your EEG results from today are back, I will let you know.     If the EEG is normal, we can consider more prolonged monitoring such as an in home ambulatory EEG with video, or inpatient video EEG monitoring as discussed today.    Follow up dependent on EEG results and next steps.

## 2025-03-08 ENCOUNTER — HEALTH MAINTENANCE LETTER (OUTPATIENT)
Age: 50
End: 2025-03-08

## 2025-03-18 ENCOUNTER — TELEPHONE (OUTPATIENT)
Dept: UROLOGY | Facility: CLINIC | Age: 50
End: 2025-03-18
Payer: MEDICAID

## 2025-03-18 NOTE — TELEPHONE ENCOUNTER
You have an appointment currently scheduled with Pietro on 6/18.  Due to changes to the providers schedule we need to reschedule your appointment.      Please use your MyChart or call [phone] to reschedule this appointment at your earliest convenience.  We also need to reschedule your [facility] at this time.    We apologize for any inconvenience this may cause.    We look forward to seeing you at your upcoming appointment and thank you for choosing [facility].    Thank you for trusting us with your care.    Sincerely, [facility]

## 2025-04-10 ENCOUNTER — TELEPHONE (OUTPATIENT)
Dept: NEUROLOGY | Facility: CLINIC | Age: 50
End: 2025-04-10
Payer: MEDICARE

## 2025-04-10 NOTE — TELEPHONE ENCOUNTER
Left Voicemail (1st Attempt) and Sent Mychart (1st Attempt) for the patient to call back and schedule the following:    Appointment type: Return MS  Provider: Dr. Hall  Return date: See Below  Specialty phone number: [phone]  Additional appointment(s) needed: NA  Additonal Notes:     Please manually check into the providers sched on 5/20 or 6/3. Please manually sched

## 2025-04-14 ENCOUNTER — TELEPHONE (OUTPATIENT)
Dept: NEUROLOGY | Facility: CLINIC | Age: 50
End: 2025-04-14
Payer: MEDICARE

## 2025-04-14 NOTE — TELEPHONE ENCOUNTER
Sent Mychart (1st Attempt) and Left Voicemail (2nd Attempt) for the patient to call back and schedule the following:    Appointment type: Return MS  Provider: Dr. Hall  Return date: 5/20/25 or 6/3/25  Specialty phone number: [phone]  Additional appointment(s) needed: NA  Additonal Notes:     Please manually look into the providers sched on the dates listed above, please manually sched the pt in a held slot

## 2025-05-05 ENCOUNTER — TELEPHONE (OUTPATIENT)
Dept: NEUROPSYCHOLOGY | Facility: CLINIC | Age: 50
End: 2025-05-05
Payer: MEDICARE

## 2025-05-05 NOTE — TELEPHONE ENCOUNTER
Left Voicemail (1st Attempt) and Sent Mychart (1st Attempt) for the patient to call back and schedule the following:    Appointment type: Testing Only  Provider: Dr. Lira  Return date: Thursday 5/8/2025 8 am or 12:30 pm  Specialty phone number: [phone]  Additional appointment(s) needed: NA  Additonal Notes:     ** Per the provider, PLEASE schedule at the [place] location, BUT please advise the pt that the appointment will take place at the [facility]. PLEASE also add in the appt notes that the appt is to be held at the [facility] per the provider **

## 2025-05-07 ENCOUNTER — TELEPHONE (OUTPATIENT)
Dept: NEUROPSYCHOLOGY | Facility: CLINIC | Age: 50
End: 2025-05-07
Payer: MEDICARE

## 2025-05-07 NOTE — TELEPHONE ENCOUNTER
Sent Mychart (1st Attempt) and Left Voicemail (2nd Attempt) for the patient to call back and schedule the following:    Appointment type: Testing Only  Provider: Dr. Lira  Return date: Thursday 5/8/2025 8 am and 12:30  Specialty phone number: [phone]  Additional appointment(s) needed: NA  Additonal Notes:       You  will need to manually schedule at the [place] location, However, the appt will take place at the [facility], please inform the pt to come to the [facility] and add a message in the appt comments that the appointment is to be held at the [facility]

## 2025-05-13 DIAGNOSIS — G35 MS (MULTIPLE SCLEROSIS) (H): ICD-10-CM

## 2025-05-13 DIAGNOSIS — R46.89 SPELL OF BEHAVIOR CHANGE: ICD-10-CM

## 2025-05-13 RX ORDER — LEVETIRACETAM 750 MG/1
750 TABLET ORAL 2 TIMES DAILY
Qty: 60 TABLET | Refills: 5 | Status: SHIPPED | OUTPATIENT
Start: 2025-05-13

## 2025-05-13 NOTE — TELEPHONE ENCOUNTER
Received refill request for Keppra from [facility]; Patient was last seen in June 2024 and has follow up appointment in August 2025 with Dr Hall. Refilled per [facility] refill protocol.    Rossana Lee RN

## 2025-05-26 ENCOUNTER — PATIENT OUTREACH (OUTPATIENT)
Dept: CARE COORDINATION | Facility: CLINIC | Age: 50
End: 2025-05-26
Payer: MEDICARE

## 2025-05-28 ENCOUNTER — PATIENT OUTREACH (OUTPATIENT)
Dept: CARE COORDINATION | Facility: CLINIC | Age: 50
End: 2025-05-28
Payer: MEDICARE

## 2025-06-09 ENCOUNTER — OFFICE VISIT (OUTPATIENT)
Dept: NEUROPSYCHOLOGY | Facility: CLINIC | Age: 50
End: 2025-06-09
Attending: CLINICAL NEUROPSYCHOLOGIST
Payer: MEDICARE

## 2025-06-09 DIAGNOSIS — G35 MS (MULTIPLE SCLEROSIS) (H): Primary | ICD-10-CM

## 2025-06-09 DIAGNOSIS — F33.0 MILD EPISODE OF RECURRENT MAJOR DEPRESSIVE DISORDER: ICD-10-CM

## 2025-06-09 DIAGNOSIS — R41.3 MEMORY CHANGE: ICD-10-CM

## 2025-06-09 PROCEDURE — 96132 NRPSYC TST EVAL PHYS/QHP 1ST: CPT | Performed by: CLINICAL NEUROPSYCHOLOGIST

## 2025-06-09 PROCEDURE — 96133 NRPSYC TST EVAL PHYS/QHP EA: CPT | Performed by: CLINICAL NEUROPSYCHOLOGIST

## 2025-06-09 PROCEDURE — 96139 PSYCL/NRPSYC TST TECH EA: CPT | Performed by: CLINICAL NEUROPSYCHOLOGIST

## 2025-06-09 PROCEDURE — 96138 PSYCL/NRPSYC TECH 1ST: CPT | Performed by: CLINICAL NEUROPSYCHOLOGIST

## 2025-06-09 NOTE — PROGRESS NOTES
CONFIDENTIAL NEUROPSYCHOLOGICAL EVALUATION  **This is a medical document intended to be read within the context of the larger medical chart and for communication with the referring provider.  It is writing in medical language and may contain abbreviations that are unfamiliar.  Please consult the provider for further clarification.**    Name:  Marsha Cavazos)  YOB: 1975  Date of Assessment: Apr 29, 2025  Referring Provider: Ny Hall  Occupation: Disability  Education: Highest level of education completed:: (Patient-Rptd) Bachelors  Handedness: right handed (currently uses left due to sx from MS)    Reason for Referral: Marsha Mcneill is a 49 year old, White female who was referred for a neuropsychological evaluation for cognitive changes within the context of secondary progressive MS.       SUMMARY AND CLINICAL IMPRESSIONS: See below for relevant background information and detailed test results.  See separate abstract for a list of neuropsychological test scores.     The results of the neuropsychological evaluation are largely normal.  Within the context of estimated average to above average pre-morbid functioning, Marsha demonstrated relative variability on tasks of new learning and higher order of tasks of attention. She demonstrated below-than-expected performance on a single task of semantic verbal fluency, although this is not considered frankly impaired and could be entirely within normal limits to have one low measures within a normal cognitive profile.  Looking specifically at learning and memory, Francisco Js new learning across verbal and visual domains was variable - performing better on tasks that were inherently structured/organized, with a weaker performance on tasks that relied on her generating memory strategies.  She did not demonstrate an amnestic profile, as she was able to recall and recognize most of the information she learned initially.  She performed within expected  "ranges on all of her other tasks including language skills, visual-spatial functioning, simple attention, processing speed, and cognitive aspects of executive functioning.    Difficulties with new learning as well as cognitive organization could reflect mild dysfunction of the frontal subcortical networks.  This would be consistent with her known history of multiple sclerosis, and consistent with imaging findings.  While multiple sclerosis is considered a neurodegenerative condition, I am not concerned about a superimposed neurodegenerative condition such as a Alzheimer's disease or FTD.  The results are also not overly lateralizing or focal that could suggest an epilepsy center.     Looking at Marsha's day-to-day functioning, it is entirely possible that the variability noted on testing can impact her daily functioning.  While some of it likely cannot be changed due to multiple sclerosis, I do suspect that she may notice an improvement in cognitive functioning should she target her longstanding history of depression.  Although she is denying any symptoms on today's evaluation either in the clinical interview or on self-report measures, she did reports she is feeling open \"neutral\" -which may reflect an affective blunting.  She is highly encouraged to consider working with a therapist if not already doing so, and things like mood and sleep could be targeted.  Additional recommendations will be made upon feedback.      DIAGNOSES  Multiple Sclerosis  Cognitive Changes due to medical condition  MDD  NICOLE    Brigette Baig PsyD, GARY  Clinical Neuropsychologist    RELEVANT BACKGROUND INFORMATION - Obtained 4/29/2025  Informed consent  was obtained after discussing the purpose and procedures of the evaluation, as well as aspects of confidentiality and effort/engagement.  Background information was obtained from a clinical interview with Marsha as well as review of available medical records.     HISTORY OF PRESENTING " ILLNESS: Marsha is a 49-year-old woman with a medical history of secondary progressive multiple sclerosis, quadriparesis with right hemiplegia and paraplegia, MDD, NICOLE, neurogenic bladder and ileostomy in place, and insomnia.  She was referred by her neurologist, who reported that Marsha has been having concerns about her memory since at least June 2024 (prompting this evaluation).  Marsha was also seen for episode of speech arrest.  EEG revealed no evidence of epileptic activity, however, Dr. Hall placed her on an antiepileptic (Keppra) to help with paroxysmal symptoms.  Most recent neurology visit (2/25/2025), suggested that Marsha undergo video EEG monitoring for continued speech arrests/suspected seizures.      Current Clinical Interview:    Marsha was interviewed via telehealth, however, her camera was not able to be turned on due to technical difficulties.  When I asked, she stated that no one else was in the room, she was safe in her home, and was able to be reached via her home phone number should any emergency happen.     With regards to cognition, Marsha reported a lot of forgetfulness, difficulties concentrating, and losing her train of thought.  She noted these onset about 2-3 years ago and have progressively gotten worse.  For example, she will be in conversation with someone and will forget what she meant to say or what she was going to say (this was observed several times throughout the interview).  Aside from symptoms from MS well documented elsewhere, Marsha does have visual difficulties including nearsightedness and double vision.  She had surgery with little efficacy.  This does not impact her ability to read or write.     Marsha spends her days listening to audio books, watching TV and playing a few games on her phone.  She lives at home with her  who works at home but will leave Marsha alone, unattended for 3-4 hours at a time with no difficulty.   She requires assistance with basic and  "instrumental activities of daily living due to MS.  She is responsible for bill payment and denied any difficulties with that, or falling victim to financial scams.  Her  helps with her medications due to changes in fine motor control.        HEALTH HABITS, BEHAVIORS AND MOOD:   Description of current mood:  \"Neutral.\" She has been had some depression over the past couple of days, so neutral is good. She does have a history of depression that is present.  She just started therapy which has been going okay.  She has never been hospitalized.  Her PCP manges her psychotropic medications.     Appetite: \"depends on the day but for the most part pretty good.\"  No changes in sense of smell or taste     Sleep/Energy: \"Variable.\" Some nights will sleep for almost 12 hours, other times only sleep for 2 hours.  She is working on limiting her caffeine intake to help with sleep maintenance. She has not had a sleep study, no LUCY, but she does snore loudly.  She takes trazodone to help with sleep onset.  She does have racing thoughts before bed.  She has not tried CBT-I (something to potentially ask her therapist about, including sleep hygiene).     Chronic Pain: has pressure ulcers on backside, really painful, had them for years, needs assistance to help with repositioning    Tobacco/Marijuana use:  Denied (records revealed marijuana usage about 1x month for pain).     Alcohol use:  Never, denied history of alcohol abuse    Other Drugs: Denied    Hallucinations: Denied    Suicidal ideation: Denied SI, HI, NSSI    MEDICAL/PSYCHIATRIC/SURGICAL HISTORY: suspected seizure activity, still on Keppra.   Patient Active Problem List   Diagnosis    Neurogenic bladder    MS (multiple sclerosis) (H)    DVT (deep venous thrombosis) (H)    Mild major depression (H)    Anxiety    Abdominal pain    Bilateral lower extremity edema    Bladder spasm    Chronic fatigue    Chronic pain disorder    Constipation    Depression    History of " recurrent UTI (urinary tract infection)    Ileostomy in place (H)    Ileus (H)    Neutropenia    Onycholysis of toenail    Osteomyelitis (H)    Poor venous access    Pressure injury of left ischium, stage 4 (H)    Recurrent UTI    Sleep difficulties    Spasticity    Tetraparesis (H)    Vaginal candidiasis    Urinary tract infection associated with catheterization of urinary tract    Ovarian mass    S/P right oophorectomy    Ileostomy status (H)    Peristomal skin complication    Abnormal involuntary movement    Clotted vascular catheter    Elevated fasting blood sugar    NICOLE (generalized anxiety disorder)    Hypertriglyceridemia    Inferior oblique overaction    Other osteoporosis without current pathological fracture    Presence of intrathecal baclofen pump    Rheumatoid arthritis (H)    Secondary adhesive capsulitis of right shoulder    Spastic triplegia (H)    Anxiety state    Chronic insomnia    Intermittent exotropia    DVT of upper extremity (deep vein thrombosis) (H)    SBO (small bowel obstruction) (H)    Dysthymic disorder    Major depressive disorder, single episode    Multiple sclerosis (H)    Quadriplegia (H)    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)       Past Medical History:   Diagnosis Date    Anxiety     Chronic pain     Depression     History of DVT (deep vein thrombosis)     Ileostomy status (H)     Insomnia     MRSA (methicillin resistant staph aureus) culture positive     Multiple scleroses     Neurological disorders     Pressure sore     Urinary tract infection        Past Surgical History:   Procedure Laterality Date    BLADDER AUGMENTATION  12/7/2012    Procedure: BLADDER AUGMENTATION;  APPENDECTOMY, BLADDER AUGMENTATION, HUMBERTO BLADDER STOMA;  Surgeon: Errol Westbrook MD;  Location: [facility] OR    CYSTOSCOPY, INJECT BOTOX N/A 1/8/2021    Procedure: CYSTOSCOPY, WITH BOTULINUM TOXIN INJECTION;  Surgeon: Errol Westbrook MD;  Location: [facility] OR    ILEOSTOMY  2018     "INSERT PUMP BACLOFEN      2 revisions    IRRIGATION AND DEBRIDEMENT DECUBITUS WOUND, COMBINED  2019    LAPAROTOMY EXPLORATORY  2012    Procedure: LAPAROTOMY EXPLORATORY;  Removal of Inter Abdominal Drain;  Surgeon: Errol Westbrook MD;  Location: [facility] OR    LAPAROTOMY EXPLORATORY      ileostomy fro diversion of obstipation    MITROFANOFF PROCEDURE (APPENDIX CONDUIT)  2012    Procedure: MITROFANOFF PROCEDURE (APPENDIX CONDUIT);;  Surgeon: Errol Westbrook MD;  Location: [facility] OR    SALPINGECTOMY Bilateral 2020    Procedure: Laparotomy, removal of pelvic mass, both fallopian tubes,;  Surgeon: Tommie Barron MD;  Location: [facility] OR       Family History:    Family History   Problem Relation Age of Onset    Cancer Mother         non hodgkin's lymphoma     Diabetes Mother     Diabetes Maternal Grandfather     Cardiovascular Maternal Grandfather     Cardiovascular Father        IMAGING:  MRI Brain completed 2025 \"INTRACRANIAL CONTENTS: No acute or subacute infarct. No mass, acute   hemorrhage, or extra-axial fluid collections. Stable small scattered   foci of increased FLAIR signal in the periventricular white matter,   left basal ganglia, right ventral viv, and corpus callosum. No new   focus of signal abnormality identified. Normal ventricles and sulci.   Normal position of the cerebellar tonsils. No pathologic contrast   enhancement. \"    MEDICATIONS: Marsha has a current medication list which includes the following prescription(s): baclofen, baclofen, diazepam, fluoxetine, forteo, ibuprofen, levetiracetam, levonorgestrel, lidocaine, metoclopramide, nystatin, oxybutynin er, sodium chloride 0.9% (bottle), and trazodone..    SOCIAL/DEVELOPMENTAL/OCCUPATIONAL HISTORY: Marsha was born and raised in [place].  She denied any pre or  complications.  She reportedly met all developmental milestone on time.  She denied any learning difficulties, inattention, or behavioral problems in " school.  She had a little bit of a hard time making friends in high school.  She graduated high school on time.  She earned her bachelors in [place] from [employer] in [place], [place].  She was a  for the [employer] where she did special sections.  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  She stopped working about 5 years ago.      BEHAVIORAL OBSERVATIONS: Marsha arrived at her appointment on time., accompanied by her   She used a wheelchair to navigate the clinic space and was able to do so without significant assistance. At times she will tip her chair back to relieve pressure.  She wore prescription glasses and hearing and vision appeared adequate for the testing environment.  Speech was normal.  Thoughts were goal-directed and linear. Affect was consistent with mood, which ranged from anxious, to euthymic, to fatigued.  Marsha was pleasant and friendly.  Even when frustrated, she responded well to redirection and was able to provide gases.  She was often apologetic if she can provide additional information.  She appeared to become more fatigued as testing went on, but was motivated to complete everything.  Insight was good.  She was forthcoming with information and participatory in today's evaluation. No SI, HI, jinny, confusion, or hallucinations/delusions noted.  Of note, the testing battery was modified to accommodate limited fine motor control and as such tasks that only required verbal responses were picked.     TEST RESULTS: Please use the following table for reference.   Percentile Ranks Descriptor   <2nd Percentile Extremely Low Range   3rd - 9th Percentile Borderline Range   10th - 16th Percentile Below Average Range   17th - 24th Percentile Low Average   25th - 75th Percentile Average   76th - 90th Percentile Above Average   91st - 97th Percentile Superior   >98th Percentile Very Superior      Performance Validity:   Marsha performed within expected ranges on embedded measures of performance validity.  Taken together with the behavioral observations above, the following results are considered an accurate representation of her current neuropsychological functioning.    Pre-Morbid Functioning: Marsha performed in the superior range on a task of single word reading.  When taken together with demographic information it is estimated that her premorbid functioning is within the  average to above average ranges.    Language Skills: Verbal abstraction is in the average range.  Confrontation naming was in the average range, and she occasionally benefited from phonemic cueing for word finding.  Phonemic verbal fluency was in the average range.  Semantic verbal fluency was in the borderline range with only 1 repetition errors noted.    Visuospatial: She performed in the extremely low range on a task of visual discrimination.  However, she performed in the average range on a task of pattern recognition and reasoning.    Attention/Working Memory/Processing Speed: Simple auditory attention was in the average range.  Working memory ranged from the below average to average range.  She performed in the low average range on a task of complex working memory that required her to dual task between holding something in mind and counting backwards by threes.  It was noted she struggled with this task often apologizing for information forgotten.    Learning/Memory: New learning for a list of words was in the borderline range, characterized by an inefficient learning curve.  She could only independently recall about half of what she learned initially after short and long delays - which was considered to be in the extremely low range.  However, recognition memory was in the average range.  New learning for narrative information was average.  She was able to recall all that she learned after a delay.  New learning for visual information was average.  She  was able to recall most of what she learned, and recognition memory was average.     Executive Functioning: Marsha performed within the above average range on a task of sustained attention and attentional tracking.  She performed in the superior range on a task of alternating attention as well.  Response inhibition was in the low average range for time to complete but in the average range for number of errors made.    Motor: Not assessed given MS-related symptoms.     Psychological Functioning: Of note, the ALEX was read aloud to Marsha and the psychometrist filled out the questionnaire with her answers, and as such this may skew Marsha's responses.  The measure is interpreted with this in mind.     Depression, Anxiety, and Stress Scale ALEX-42   Domain Score Interpretation   Depression  9  Normal   Anxiety  4  Normal   Stress 13   Normal     Procedures Administered by Psychologist: Clinical Interview with Marsha on 4/29/2025, review of available medical records, test selection, interpretation, preparation of written report, and feedback. Please see nursing note for procedures administered by psychometrist.      Tests Administered:  [facility] Neuropsych Test List: NAB Orientation, ACS TOPF, WAIS-IV Tests: Similarities, Digit Span, and Matrix Reasoning, BVMT-R, COWAT/Animals, Color-Word Interference, ALEX-42, Auditory Consonant Trigrams, and NAB List Learning, Shape Learning, Story Learning, Visual Discrimination, Oral Trails, Test of Sustained Attention and Tracking.      Activities included in this evaluation CPT Code Time Units   Psychological Diagnostic Interview 68156 - Billed on 4/29/2025   Neuropsychology Professional Time [id number] 1   [place] on 06290  1   Neuropsychologist Admin/Scoring Tests [id number]     Add On 19862     Psychometrician Time - Test [id number] 1   Admin/Scoring [id number]  7   DX:

## 2025-06-10 NOTE — PROGRESS NOTES
The patient was seen for neuropsychological evaluation at the request of Debo Hall MD for the purposes of diagnostic clarification and treatment planning. 234 minutes of test administration and scoring were provided by this writer. Please see Dr. Brigette Baig's report for a full interpretation of the findings.    Serenity Wood  Psychometrist

## 2025-06-11 ENCOUNTER — OFFICE VISIT (OUTPATIENT)
Dept: NEUROLOGY | Facility: CLINIC | Age: 50
End: 2025-06-11
Attending: PSYCHIATRY & NEUROLOGY
Payer: MEDICARE

## 2025-06-11 VITALS
OXYGEN SATURATION: 93 % | BODY MASS INDEX: 20.4 KG/M2 | DIASTOLIC BLOOD PRESSURE: 81 MMHG | HEIGHT: 67 IN | SYSTOLIC BLOOD PRESSURE: 112 MMHG | HEART RATE: 116 BPM | WEIGHT: 130 LBS

## 2025-06-11 DIAGNOSIS — G82.50 QUADRIPARESIS (H): ICD-10-CM

## 2025-06-11 DIAGNOSIS — Z51.81 THERAPEUTIC DRUG MONITORING: ICD-10-CM

## 2025-06-11 DIAGNOSIS — G35 MULTIPLE SCLEROSIS (H): Primary | ICD-10-CM

## 2025-06-11 DIAGNOSIS — G82.20 PARAPLEGIA (H): ICD-10-CM

## 2025-06-11 PROCEDURE — G0463 HOSPITAL OUTPT CLINIC VISIT: HCPCS | Performed by: PSYCHIATRY & NEUROLOGY

## 2025-06-11 RX ORDER — METOCLOPRAMIDE 10 MG/1
10 TABLET ORAL
COMMUNITY
Start: 2025-05-01

## 2025-06-11 ASSESSMENT — PAIN SCALES - GENERAL: PAINLEVEL_OUTOF10: MODERATE PAIN (6)

## 2025-06-11 NOTE — NURSING NOTE
Chief Complaint   Patient presents with    MS    RECHECK     MS follow up      Vitals were taken and medications were reconciled.   Logan Estrella, EMT  9:29 AM

## 2025-06-11 NOTE — PROGRESS NOTES
Date of Service: 6/11/2025    [facility] Neurology   MS Clinic Evaluation     Subjective: 49-year-old woman who has a history of juvenile rheumatoid arthritis, anxiety and depression who presents in follow-up for multiple sclerosis.    She is accompanied by her significant other, Home.     From what I can gather, it seems that she last received rituximab 1 year ago.  Approximately May 10.  I am not able to verify this in the chart.  She receives infusions through [facility].    She did seek another opinion from [facility].  They did not advise any changes to her care.  They mentioned potentially trying Ampyra, though we discussed today how seizures are a contraindication to an Ampyra trial.    She reports that she has struggled with the left ischial wound.  She worked with wound therapy.  The wound has healed, but she has residual pain as if the wound is still there.  She describes this pain as a burning pain.  She has therefore requested to visit with pain management.  Referral was placed a couple weeks ago.    Recall that she has struggled with recurrent spells of speech arrest.  These have happened about 2 times per month.  They last 5 to 10 minutes at a time.  Frequency is reduced compared to prior to initiation of Keppra.  She feels that this medication has been helpful.    She has previously struggled with extensor spasms.  These have not been as problematic as of lately.    She does struggle with recurrent UTIs.  She had approximately 4 of them last year and has had 2 of them so far this year.    She does appear to have lost weight.  She estimates a 20 pound weight loss over the past year.    Last relapse: age 45, 4/2021, left arm weakness    Progression onset: around age 31    Prior DMDs:   Copaxone 3756-8692, discontinued due to inefficacy  Rebif [id number], discontinued due to personal cost and perceived inefficacy  Rituximab of April 2019 for a total of 2 doses, discontinued due to change in  neurologists  Ocrevus 6/2020-12/2020, discontinued because clinical symptoms while on drug and insurance would not continue  rituximab 12/1/2021 & 1/5/2022; ?5/10/24      Allergies   Allergen Reactions    Amoxicillin-Pot Clavulanate Unknown, GI Disturbance and Nausea and Vomiting     vomiting      Iodine Rash    Mushroom GI Disturbance     vomiting    Povidone Iodine Rash    Amoxicillin Nausea and Vomiting    Aspirin Other (See Comments)     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  Reye Syndrome as a child    Macrobid [Nitrofurantoin Anhydrous] Nausea and Vomiting    Penicillins Nausea and Vomiting     Other reaction(s): *Unknown    Ga      Other reaction(s): GI Upset  N/V       Current Outpatient Medications   Medication Sig Dispense Refill    baclofen (GABLOFEN) 74651 MCG/20ML injection As of February 3, 2020   Drug: Baclofen 2000 mcg/mL   Rate (simple continuous rate): 459.5 mcg/day   Low reservoir alarm date: 4/16/20  Managed by Dr. Martinez      baclofen (LIORESAL) 10 MG tablet Take 10 mg by mouth as needed       diazepam (VALIUM) 5 MG tablet Take 1 tablet (5 mg) by mouth daily as needed for muscle spasms. 15 tablet 1    FLUoxetine (PROZAC) 40 MG capsule Take 1 capsule (40 mg) by mouth daily 30 capsule 11    FORTEO 600 MCG/2.4ML SOPN injection INJECT 20 MCG (0.08ML) SUBCUTANEOUSLY DAILY      ibuprofen (ADVIL/MOTRIN) 600 MG tablet Take 1 tablet (600 mg) by mouth every 6 hours as needed for moderate pain 12 tablet 0    levETIRAcetam (KEPPRA) 750 MG tablet Take 1 tablet (750 mg) by mouth 2 times daily. 60 tablet 5    levonorgestrel (MIRENA) 20 MCG/24HR IUD 1 Intra Uterine Device by Intrauterine route      lidocaine (XYLOCAINE) 5 % external ointment APPLY TOPICALLY TO AFFECTED AREA(S) THREE TIMES DAILY AS NEEDED FOR PAIN.      metoclopramide (REGLAN) 10 MG tablet Take 10 mg by mouth.      nystatin (MYCOSTATIN) 953093 UNIT/GM external cream 2 times daily as needed       oxybutynin ER (DITROPAN XL) 15 MG 24 hr tablet Take 2  "tablets (30 mg) by mouth daily 60 tablet 3    sodium chloride 0.9%, bottle, 0.9 % irrigation       traZODone (DESYREL) 100 MG tablet TAKE 1 TAB BY MOUTH AT BEDTIME  99    calcium carbonate (TUMS) 500 MG chewable tablet Take 1-2 chew tab by mouth daily as needed for heartburn      diazepam (VALIUM) 5 MG tablet Take 1-2 tablets 30 minutes before MRI, 3rd tablet if needed. No driving for 8 hours after taking. 3 tablet 0    diazepam (VALIUM) 5 MG tablet Take 1-2 tablets 30 minutes before MRI, 3rd tablet if needed. No driving for 8 hours after taking. 3 tablet 0    DOMPERIDONE 10 MG TAB/CAP Take by mouth 3 times daily.       No current facility-administered medications for this visit.        Past medical, surgical, social and family history was personally reviewed. Pertinent details noted above.     Physical Examination:   /81 (BP Location: Left arm, Patient Position: Sitting, Cuff Size: Adult Small)   Pulse 116   Ht 1.702 m (5' 7\")   Wt 59 kg (130 lb)   SpO2 93%   BMI 20.36 kg/m      General: no acute distress  Cranial nerves:   VFFC  PERRL w/no RAPD  EOM full w/L NINFA   Face symmetric  Hearing intact  Mild/mod spastic scanning dysarthria  Motor:   Tone is generally reduced aside from LUE where she is spastic, developing contractures                          R          L  Deltoid             2          4  Biceps             1          4+  Triceps  1          4+  Wrist ext 1 4-  Finger ext 1 2  Finger abd 1 1     Hip flexion 0 0  Knee flexion 0 0  Knee ext 0 0  Ankle d/f 0 0     Reflexes: reduced t/o, no ankle clonus  Sensory: vibration is mod reduced in the ankles  Coordination: no ataxia or dysmetria  Gait: wcb    Tests/Imaging:   JASON virus Ab 0.08 7/2020  Vitamin D 52    10/2022 - b cells 0  igg 525  Abs lymph 1100    MRI brain   12/2019 - overall mild burden no significant posterior fossa lesions, periventricular and juxtacortical lesions noted, gd-, mild atrophy noted for age  3/2021 - no new lesions, gd- "   9/2022 - no new lesions, gd-   5/2025 - (done at [facility]) reported stable     MRI cervical spine   12/2019 - large lesion extending from c2-c4, gd-, associated atrophy  9/2022 - no new lesions, gd-   5/2025 - (done at [facility]) reported stable     MRI thoracic spine   12/2019 - c7-t1 lesion, additional 3 lesions possibly present but quality is suboptimal, gd-   5/2025 - (done at [facility]) reported stable      Assessment: 49-year-old woman with multiple sclerosis, likely in the secondary progressive phase of the disease.  However, clinical examination does reveal slight improvement in strength today.    She is advised to continue with rituximab 1000 mg as B-cell start to recover.  We have done extensive interval dosing to decrease/minimize risk of infection.    We discussed the anticipated approval of a B TK inhibitor, though given her age I would consider it favorable to continue rituximab over transition to this therapy due to persistent risk for relapse.    She will continue with Keppra for prevention of spells, though I have sent a message to the epilepsy team for follow-up on their last send.    Plan:   -CBC, CD19, IgG  - Continue Keppra  - Follow-up in 6 months    Note was completed with the assistance of Dragon Fluency software which can often result in accidental word substitutions.   The longitudinal plan of care for the diagnosis(es)/condition(s) as documented were addressed during this visit. Due to the added complexity in care, I will continue to support Marsha in the subsequent management and with ongoing continuity of care.    A total of 30 minutes on the date of service were spent in the care of this patient.   Ny Hall MD on 6/11/2025 at 9:40 AM

## 2025-06-11 NOTE — LETTER
6/11/2025       RE: Marsha Mcneill  [address] [facility] [address]     Dear Colleague,    Thank you for referring your patient, Marsha Mcneill, to the [facility] MULTIPLE SCLEROSIS CLINIC [place] at [facility]. Please see a copy of my visit note below.    Date of Service: 6/11/2025    [facility] Neurology   MS Clinic Evaluation     Subjective: 49-year-old woman who has a history of juvenile rheumatoid arthritis, anxiety and depression who presents in follow-up for multiple sclerosis.    She is accompanied by her significant other, Home.     From what I can gather, it seems that she last received rituximab 1 year ago.  Approximately May 10.  I am not able to verify this in the chart.  She receives infusions through [facility].    She did seek another opinion from [facility].  They did not advise any changes to her care.  They mentioned potentially trying Ampyra, though we discussed today how seizures are a contraindication to an Ampyra trial.    She reports that she has struggled with the left ischial wound.  She worked with wound therapy.  The wound has healed, but she has residual pain as if the wound is still there.  She describes this pain as a burning pain.  She has therefore requested to visit with pain management.  Referral was placed a couple weeks ago.    Recall that she has struggled with recurrent spells of speech arrest.  These have happened about 2 times per month.  They last 5 to 10 minutes at a time.  Frequency is reduced compared to prior to initiation of Keppra.  She feels that this medication has been helpful.    She has previously struggled with extensor spasms.  These have not been as problematic as of lately.    She does struggle with recurrent UTIs.  She had approximately 4 of them last year and has had 2 of them so far this year.    She does appear to have lost weight.  She estimates a 20 pound weight loss over the past year.    Last  relapse: age 45, 4/2021, left arm weakness    Progression onset: around age 31    Prior DMDs:   Copaxone 5316-7497, discontinued due to inefficacy  Rebif 1533-6450, discontinued due to personal cost and perceived inefficacy  Rituximab of April 2019 for a total of 2 doses, discontinued due to change in neurologists  Ocrevus 6/2020-12/2020, discontinued because clinical symptoms while on drug and insurance would not continue  rituximab 12/1/2021 & 1/5/2022; ?5/10/24      Allergies   Allergen Reactions     Amoxicillin-Pot Clavulanate Unknown, GI Disturbance and Nausea and Vomiting     vomiting       Iodine Rash     Mushroom GI Disturbance     vomiting     Povidone Iodine Rash     Amoxicillin Nausea and Vomiting     Aspirin Other (See Comments)     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  Reye Syndrome as a child     Macrobid [Nitrofurantoin Anhydrous] Nausea and Vomiting     Penicillins Nausea and Vomiting     Other reaction(s): *Unknown     [place]      Other reaction(s): GI Upset  N/V       Current Outpatient Medications   Medication Sig Dispense Refill     baclofen (GABLOFEN) 78318 MCG/20ML injection As of February 3, 2020   Drug: Baclofen 2000 mcg/mL   Rate (simple continuous rate): 459.5 mcg/day   Low reservoir alarm date: 4/16/20  Managed by Dr. Martinez       baclofen (LIORESAL) 10 MG tablet Take 10 mg by mouth as needed        diazepam (VALIUM) 5 MG tablet Take 1 tablet (5 mg) by mouth daily as needed for muscle spasms. 15 tablet 1     FLUoxetine (PROZAC) 40 MG capsule Take 1 capsule (40 mg) by mouth daily 30 capsule 11     FORTEO 600 MCG/2.4ML SOPN injection INJECT 20 MCG (0.08ML) SUBCUTANEOUSLY DAILY       ibuprofen (ADVIL/MOTRIN) 600 MG tablet Take 1 tablet (600 mg) by mouth every 6 hours as needed for moderate pain 12 tablet 0     levETIRAcetam (KEPPRA) 750 MG tablet Take 1 tablet (750 mg) by mouth 2 times daily. 60 tablet 5     levonorgestrel (MIRENA) 20 MCG/24HR IUD 1 Intra Uterine Device by Intrauterine route        "lidocaine (XYLOCAINE) 5 % external ointment APPLY TOPICALLY TO AFFECTED AREA(S) THREE TIMES DAILY AS NEEDED FOR PAIN.       metoclopramide (REGLAN) 10 MG tablet Take 10 mg by mouth.       nystatin (MYCOSTATIN) 766330 UNIT/GM external cream 2 times daily as needed        oxybutynin ER (DITROPAN XL) 15 MG 24 hr tablet Take 2 tablets (30 mg) by mouth daily 60 tablet 3     sodium chloride 0.9%, bottle, 0.9 % irrigation        traZODone (DESYREL) 100 MG tablet TAKE 1 TAB BY MOUTH AT BEDTIME  99     calcium carbonate (TUMS) 500 MG chewable tablet Take 1-2 chew tab by mouth daily as needed for heartburn       diazepam (VALIUM) 5 MG tablet Take 1-2 tablets 30 minutes before MRI, 3rd tablet if needed. No driving for 8 hours after taking. 3 tablet 0     diazepam (VALIUM) 5 MG tablet Take 1-2 tablets 30 minutes before MRI, 3rd tablet if needed. No driving for 8 hours after taking. 3 tablet 0     DOMPERIDONE 10 MG TAB/CAP Take by mouth 3 times daily.       No current facility-administered medications for this visit.        Past medical, surgical, social and family history was personally reviewed. Pertinent details noted above.     Physical Examination:   /81 (BP Location: Left arm, Patient Position: Sitting, Cuff Size: Adult Small)   Pulse 116   Ht 1.702 m (5' 7\")   Wt 59 kg (130 lb)   SpO2 93%   BMI 20.36 kg/m      General: no acute distress  Cranial nerves:   VFFC  PERRL w/no RAPD  EOM full w/L NINFA   Face symmetric  Hearing intact  Mild/mod spastic scanning dysarthria  Motor:   Tone is generally reduced aside from LUE where she is spastic, developing contractures                          R          L  Deltoid             2          4  Biceps             1          4+  Triceps  1          4+  Wrist ext 1 4-  Finger ext 1 2  Finger abd 1 1     Hip flexion 0 0  Knee flexion 0 0  Knee ext 0 0  Ankle d/f 0 0     Reflexes: reduced t/o, no ankle clonus  Sensory: vibration is mod reduced in the ankles  Coordination: no " ataxia or dysmetria  Gait: wcb    Tests/Imaging:   JASON virus Ab 0.08 7/2020  Vitamin D 52    10/2022 - b cells 0  igg 525  Abs lymph 1100    MRI brain   12/2019 - overall mild burden no significant posterior fossa lesions, periventricular and juxtacortical lesions noted, gd-, mild atrophy noted for age  3/2021 - no new lesions, gd-   9/2022 - no new lesions, gd-   5/2025 - (done at [facility]) reported stable     MRI cervical spine   12/2019 - large lesion extending from c2-c4, gd-, associated atrophy  9/2022 - no new lesions, gd-   5/2025 - (done at [facility]) reported stable     MRI thoracic spine   12/2019 - c7-t1 lesion, additional 3 lesions possibly present but quality is suboptimal, gd-   5/2025 - (done at [facility]) reported stable      Assessment: 49-year-old woman with multiple sclerosis, likely in the secondary progressive phase of the disease.  However, clinical examination does reveal slight improvement in strength today.    She is advised to continue with rituximab 1000 mg as B-cell start to recover.  We have done extensive interval dosing to decrease/minimize risk of infection.    We discussed the anticipated approval of a B TK inhibitor, though given her age I would consider it favorable to continue rituximab over transition to this therapy due to persistent risk for relapse.    She will continue with Keppra for prevention of spells, though I have sent a message to the epilepsy team for follow-up on their last send.    Plan:   -CBC, CD19, IgG  - Continue Keppra  - Follow-up in 6 months    Note was completed with the assistance of Dragon Fluency software which can often result in accidental word substitutions.   The longitudinal plan of care for the diagnosis(es)/condition(s) as documented were addressed during this visit. Due to the added complexity in care, I will continue to support Marsha in the subsequent management and with ongoing continuity of care.    A total of 30 minutes on the date of  service were spent in the care of this patient.   Ny Hall MD on 6/11/2025 at 9:40 AM            Again, thank you for allowing me to participate in the care of your patient.      Sincerely,    Ny Hall MD

## 2025-06-11 NOTE — PATIENT INSTRUCTIONS
Blood work due to check response to rituximab     You are actually a bit stronger today compared to your last exam with me     Continue your current medications and plan for a dose of rituximab when your b cells start to return     I sent a message to our epilepsy team so that they follow up with you     Follow up in 6 months (can be video as long as I see you in person once per year)

## 2025-06-30 ENCOUNTER — TELEPHONE (OUTPATIENT)
Dept: NEUROPSYCHOLOGY | Facility: CLINIC | Age: 50
End: 2025-06-30
Payer: MEDICARE

## 2025-06-30 NOTE — TELEPHONE ENCOUNTER
Called and left VM regarding results of neuropsychological evaluation.  Instructed to call should she have any questions or want additional recommendations.     Brigette Baig PsyD, LP  Clinical Neuropsychologist  June 30, 2025

## 2025-06-30 NOTE — PROGRESS NOTES
Provider: AL      Tech: KS      Patient Name: Marsha Mcneill     : 1975      MRN: [id number]     KIRBY: 2025      Age: 49      Education: 16      Ethnicity: W      Handedness: Right      Station: OP             NEUROPSYCHOLOGICAL TESTS RAW SCORE STANDARDIZED SCORE* PERCENTILE   WAIS-IV Digit Span       RDS 9 --            NAB Orientation        Total Score 29 --            ACS Test of Premorbid Functioning (TOPF)       Total Score 63 Actual SS= 120 91%     Predicted SS= 109 73%     E-FSIQ= 115 84%          Wechsler Adult Intelligence Scale-IV (WAIS-IV)       Digit Span 26 ScS= 9 37%   Digit Span Forward 10 ScS= 10 50%   Digit Span Backward 7 ScS= 8 25%   Digit Span Sequencing 9 ScS= 11 63%   Digit Span-Longest Digit Forward 6      Digit Span-Longest Digit Backward 4      Digit Span-Longest Digit Sequencing 6          Similarities 26 ScS= 10 50%   Matrix Reasoning 16 ScS= 10 50%          Test of Sustained Attention and Tracking (TSAT)       Time 66 Z= 0.83 80%   Errors 0 Z= 0.92 82%   Oral Trail Making Test (Time/Errors)        Part A 7/0 Z= -0.01 50%   Part B 70/1 Z= 2.78 99%   Auditory Consonant Trigrams       0s 15 --     9s 9 Z= -1.19 12%   18s 9 Z= -0.48 32%   36s 7 Z= -0.95 17%   Jazmine-Conti Executive Function System (D-KEFS) Color-Word Interference Test     Color Naming 26 ScS= 11 63%   Word Reading 18 ScS= 12 75%   Inhibition 68 ScS= 7 16%   Inhibition Total Errors 0 ScS= 12 75%   Inhibition/Switching 75 ScS= 8 25%   Inhibition/Switching Total Errors 2 ScS= 10 50%          Neuropsychological Assessment Battery (NAB) List Learning (Form 1)     Trial 1 4 --  10   Trial 2 7 --  17   Trial 3 9 --  24   Total Trials 1-3 20 T= 33 4%   List B  5 T= 46 34%   Short Delay 4 T= 21 <1%   Long Delay 5 T= 29 1%   % Retention 125% --  90   Forced Choice Recognition 9 --  5   Forced Choice False Alarms 1 --  12   Discriminability 8 --  25   Perseverations 0 --  36   Neuropsychological Assessment Battery (NAB) Shape  Learning (Form 1)     Trial 1 6 --  75   Trial 2 7 --  75   Trial 3 8 --  75   Total Trials 1 - 3 21 T= 55 69%   Delayed Recall 6 T= 21 <1%   % Retention 75% --  9   Forced Choice Recognition 8 --  25   Forced Choice False Alarms 0 --  50   Discriminability 8 --  50   Neuropsychological Assessment Battery (NAB) Story Learning (Form 1)     Trial 1 Phrase 30 --  50   Trial 2 Phrase 36 --  50   Story Learning Phrase Immediate 66 T= 46 34%   Story Learning Thematic Immediate 19 --  75   Trial 1 Thematic 9 --  75   Trial 2 Thematic 10 --  75   Phrase Delay Recall 37 T= 51 54%   Thematic Delay Recall 10 --  75   Phrase % Retention 103% --  75          COWAT (Form: FAS)       Total 40 T= 44 27%   Repetition Errors 0      Set Loss Errors 0      Animal Fluency       Total 15 T= 34 5%   Repetition Errors 1      Set Loss Errors 0      [place]orden Naming Test       Total 56 T= 42 21%   Phonemic Cue (Correct/Administered) 2/4             Neuropsychological Assessment Battery (NAB) Spatial Module      Visual Discrimination 13 T= 30 2%          Depression, Anxiety, Stress Scale (ALEX-42)       Depression 9      Anxiety 4      Stress 13      Abstract       WNL = within normal limits. DC = discontinued due to patient s inability to complete the test. (E) = Utilized age and education-corrected norms.   *Standardized scores: T= T-score; mean = 50, standard deviation =10; Z= z-score; mean = 0, standard deviation = 1; ScS = scaled score; mean = 10, standard deviation = 3; MAS = [place] Older Adult Normative Study age adjusted scaled score; mean = 10, standard deviation = 3; SS = standard score; mean = 100, standard deviation = 15.   **Descriptive ranges are based on American Academy of Clinical Neuropsychology (2020) consensus guidelines, or test manuals where appropriate.

## 2025-07-01 DIAGNOSIS — F33.0 MILD EPISODE OF RECURRENT MAJOR DEPRESSIVE DISORDER: ICD-10-CM

## 2025-07-01 RX ORDER — FLUOXETINE HYDROCHLORIDE 40 MG/1
40 CAPSULE ORAL DAILY
Qty: 30 CAPSULE | Refills: 11 | Status: SHIPPED | OUTPATIENT
Start: 2025-07-01

## 2025-07-01 NOTE — TELEPHONE ENCOUNTER
[facility] Call Center    Phone Message    May a detailed message be left on voicemail: yes     Reason for Call: Pt was returning ac all from provider from 6/30/25. Pt states she will be available all day today 7/1/25 via phone. Please return Pt's call at [phone]    Action Taken: [place] [facility]    Travel Screening: Not Applicable

## 2025-07-01 NOTE — TELEPHONE ENCOUNTER
Received refill request for fluoxetine from [facility]; Patient was last seen in June and has follow up appointment in December with Dr. Hall. Refilled per [facility] refill protocol.    Vilma Russell RN

## 2025-07-17 ENCOUNTER — TELEPHONE (OUTPATIENT)
Dept: UROLOGY | Facility: CLINIC | Age: 50
End: 2025-07-17
Payer: MEDICARE

## 2025-07-17 NOTE — TELEPHONE ENCOUNTER
[facility] Call Center    Phone Message    May a detailed message be left on voicemail: yes     Reason for Call: Other: pt calling and wondering when she is to do her ultra sound   please reach out to her regarding this     Action Taken: Other: urology    Travel Screening: Not Applicable     Date of Service:

## 2025-07-17 NOTE — TELEPHONE ENCOUNTER
Called, no answer, left message. MUNIRA can be done at patient's earliest convenience.    FLO Thomas  Care Coordinator- Urology   [phone]

## 2025-07-18 ENCOUNTER — TELEPHONE (OUTPATIENT)
Dept: NEUROLOGY | Facility: CLINIC | Age: 50
End: 2025-07-18
Payer: MEDICARE

## 2025-07-18 DIAGNOSIS — G35 MULTIPLE SCLEROSIS (H): Primary | ICD-10-CM

## 2025-07-21 ENCOUNTER — PRE VISIT (OUTPATIENT)
Dept: UROLOGY | Facility: CLINIC | Age: 50
End: 2025-07-21
Payer: MEDICARE

## 2025-07-21 RX ORDER — HEPARIN SODIUM (PORCINE) LOCK FLUSH IV SOLN 100 UNIT/ML 100 UNIT/ML
5 SOLUTION INTRAVENOUS
OUTPATIENT
Start: 2025-08-18

## 2025-07-21 RX ORDER — EPINEPHRINE 1 MG/ML
0.3 INJECTION, SOLUTION, CONCENTRATE INTRAVENOUS EVERY 5 MIN PRN
OUTPATIENT
Start: 2025-08-18

## 2025-07-21 RX ORDER — MEPERIDINE HYDROCHLORIDE 25 MG/ML
25 INJECTION INTRAMUSCULAR; INTRAVENOUS; SUBCUTANEOUS
OUTPATIENT
Start: 2025-08-18

## 2025-07-21 RX ORDER — DIPHENHYDRAMINE HYDROCHLORIDE 50 MG/ML
25 INJECTION, SOLUTION INTRAMUSCULAR; INTRAVENOUS
Start: 2025-08-18

## 2025-07-21 RX ORDER — METHYLPREDNISOLONE SODIUM SUCCINATE 125 MG/2ML
125 INJECTION INTRAMUSCULAR; INTRAVENOUS ONCE
OUTPATIENT
Start: 2025-08-18

## 2025-07-21 RX ORDER — METHYLPREDNISOLONE SODIUM SUCCINATE 40 MG/ML
40 INJECTION INTRAMUSCULAR; INTRAVENOUS
Start: 2025-08-18

## 2025-07-21 RX ORDER — DIPHENHYDRAMINE HYDROCHLORIDE 50 MG/ML
50 INJECTION, SOLUTION INTRAMUSCULAR; INTRAVENOUS
Start: 2025-08-18

## 2025-07-21 RX ORDER — ALBUTEROL SULFATE 90 UG/1
1-2 INHALANT RESPIRATORY (INHALATION)
Start: 2025-08-18

## 2025-07-21 RX ORDER — HEPARIN SODIUM,PORCINE 10 UNIT/ML
5-20 VIAL (ML) INTRAVENOUS DAILY PRN
OUTPATIENT
Start: 2025-08-18

## 2025-07-21 RX ORDER — DIPHENHYDRAMINE HCL 25 MG
50 CAPSULE ORAL ONCE
Start: 2025-08-18

## 2025-07-21 RX ORDER — ACETAMINOPHEN 325 MG/1
650 TABLET ORAL ONCE
Start: 2025-08-18

## 2025-07-21 RX ORDER — ALBUTEROL SULFATE 0.83 MG/ML
2.5 SOLUTION RESPIRATORY (INHALATION)
OUTPATIENT
Start: 2025-08-18

## 2025-07-21 NOTE — TELEPHONE ENCOUNTER
Previsit Planning        Reason for visit: Cystoscopy     Relevant Information: UDS follow-up    Records/imaging/labs/orders: Working on scheduling MUNIRA and obtaining outside imaging records.     Rooming: Standard Cystoscope

## 2025-08-04 ENCOUNTER — TELEPHONE (OUTPATIENT)
Dept: UROLOGY | Facility: CLINIC | Age: 50
End: 2025-08-04

## (undated) DEVICE — CONNECTOR WATER VALVE PERFUSION PACK STR 020272801

## (undated) DEVICE — DRAPE MAYO STAND 23X54 8337

## (undated) DEVICE — PREP TECHNI-CARE CHLOROXYLENOL 3% 4OZ BOTTLE C222-4ZWO

## (undated) DEVICE — NDL BLUNT 18GA 1" W/O FILTER 305181

## (undated) DEVICE — SU VICRYL 2-0 TIE 54" J615H

## (undated) DEVICE — ESU GROUND PAD ADULT W/CORD E7507

## (undated) DEVICE — PACK CYSTO CUSTOM ASC

## (undated) DEVICE — SUCTION MANIFOLD NEPTUNE 2 SYS 4 PORT 0702-020-000

## (undated) DEVICE — BARRIER SEPRAFILM 3X5" X6 SHEETS 5086-02

## (undated) DEVICE — KIT NEW IMAGE COLOSTOMY/ILEOSTOMY 2 3/4" LF 19354

## (undated) DEVICE — GLOVE LINER HALF FINGER NYLON KP5015/50

## (undated) DEVICE — STPL SKIN 35W ROTATING HEAD PRW35

## (undated) DEVICE — SUCTION MANIFOLD DORNOCH ULTRA CART UL-CL500

## (undated) DEVICE — SU VICRYL 2-0 CT-2 27" J333H

## (undated) DEVICE — PACK AB HYST II

## (undated) DEVICE — SU VICRYL 4-0 PS-2 18" UND J496H

## (undated) DEVICE — PAD CHUX UNDERPAD 30X30"

## (undated) DEVICE — LINEN TOWEL PACK X5 5464

## (undated) DEVICE — SYR 10ML LL W/O NDL 302995

## (undated) DEVICE — PREP CHLORAPREP 26ML TINTED ORANGE  260815

## (undated) DEVICE — BLADE CLIPPER SGL USE 9680

## (undated) DEVICE — NDL ASPIRATING INJECT 22GA 31.25CM

## (undated) DEVICE — GLOVE PROTEXIS W/NEU-THERA 7.5  2D73TE75

## (undated) DEVICE — SYR 10ML LL W/O NDL

## (undated) DEVICE — SOL NACL 0.9% IRRIG 500ML BOTTLE 2F7123

## (undated) DEVICE — LINEN GOWN XLG 5407

## (undated) DEVICE — LINEN TOWEL PACK X30 5481

## (undated) DEVICE — SYR 30ML LL W/O NDL 302832

## (undated) DEVICE — GLOVE PROTEXIS BLUE W/NEU-THERA 8.0  2D73EB80

## (undated) DEVICE — SOL NACL 0.9% 10ML VIAL 0409-4888-02

## (undated) DEVICE — GLOVE PROTEXIS W/NEU-THERA 8.0  2D73TE80

## (undated) DEVICE — SOL WATER IRRIG 3000ML BAG 2B7117

## (undated) DEVICE — WIPE PREMOIST CLEANSING WASHCLOTHS 7988

## (undated) DEVICE — SU PDS II 0 TP-1 60" Z991G

## (undated) DEVICE — LINEN TOWEL PACK X6 WHITE 5487

## (undated) DEVICE — DRAPE LEGGINGS CLEAR 8430

## (undated) RX ORDER — CIPROFLOXACIN 2 MG/ML
INJECTION, SOLUTION INTRAVENOUS
Status: DISPENSED
Start: 2020-02-26

## (undated) RX ORDER — SODIUM CHLORIDE, SODIUM LACTATE, POTASSIUM CHLORIDE, CALCIUM CHLORIDE 600; 310; 30; 20 MG/100ML; MG/100ML; MG/100ML; MG/100ML
INJECTION, SOLUTION INTRAVENOUS
Status: DISPENSED
Start: 2020-02-26

## (undated) RX ORDER — DEXAMETHASONE SODIUM PHOSPHATE 4 MG/ML
INJECTION, SOLUTION INTRA-ARTICULAR; INTRALESIONAL; INTRAMUSCULAR; INTRAVENOUS; SOFT TISSUE
Status: DISPENSED
Start: 2020-02-26

## (undated) RX ORDER — PROPOFOL 10 MG/ML
INJECTION, EMULSION INTRAVENOUS
Status: DISPENSED
Start: 2020-02-26

## (undated) RX ORDER — HEPARIN SODIUM (PORCINE) LOCK FLUSH IV SOLN 100 UNIT/ML 100 UNIT/ML
SOLUTION INTRAVENOUS
Status: DISPENSED
Start: 2021-01-25

## (undated) RX ORDER — PHENAZOPYRIDINE HYDROCHLORIDE 200 MG/1
TABLET, FILM COATED ORAL
Status: DISPENSED
Start: 2020-02-26

## (undated) RX ORDER — HEPARIN SODIUM (PORCINE) LOCK FLUSH IV SOLN 100 UNIT/ML 100 UNIT/ML
SOLUTION INTRAVENOUS
Status: DISPENSED
Start: 2021-01-08

## (undated) RX ORDER — PROPOFOL 10 MG/ML
INJECTION, EMULSION INTRAVENOUS
Status: DISPENSED
Start: 2021-01-08

## (undated) RX ORDER — PHENYLEPHRINE HCL IN 0.9% NACL 1 MG/10 ML
SYRINGE (ML) INTRAVENOUS
Status: DISPENSED
Start: 2020-02-26

## (undated) RX ORDER — IBUPROFEN 200 MG
TABLET ORAL
Status: DISPENSED
Start: 2020-02-26

## (undated) RX ORDER — CIPROFLOXACIN 500 MG/1
TABLET, FILM COATED ORAL
Status: DISPENSED
Start: 2020-08-24

## (undated) RX ORDER — EPHEDRINE SULFATE 50 MG/ML
INJECTION, SOLUTION INTRAMUSCULAR; INTRAVENOUS; SUBCUTANEOUS
Status: DISPENSED
Start: 2020-02-26

## (undated) RX ORDER — HYDROMORPHONE HYDROCHLORIDE 1 MG/ML
INJECTION, SOLUTION INTRAMUSCULAR; INTRAVENOUS; SUBCUTANEOUS
Status: DISPENSED
Start: 2020-02-26

## (undated) RX ORDER — ACETAMINOPHEN 325 MG/1
TABLET ORAL
Status: DISPENSED
Start: 2020-02-26

## (undated) RX ORDER — CALCIUM CHLORIDE 100 MG/ML
INJECTION INTRAVENOUS; INTRAVENTRICULAR
Status: DISPENSED
Start: 2020-02-26

## (undated) RX ORDER — FENTANYL CITRATE 50 UG/ML
INJECTION, SOLUTION INTRAMUSCULAR; INTRAVENOUS
Status: DISPENSED
Start: 2020-02-26

## (undated) RX ORDER — LIDOCAINE HYDROCHLORIDE 20 MG/ML
INJECTION, SOLUTION EPIDURAL; INFILTRATION; INTRACAUDAL; PERINEURAL
Status: DISPENSED
Start: 2020-02-26

## (undated) RX ORDER — ACETAMINOPHEN 325 MG/1
TABLET ORAL
Status: DISPENSED
Start: 2021-01-08

## (undated) RX ORDER — ONDANSETRON 2 MG/ML
INJECTION INTRAMUSCULAR; INTRAVENOUS
Status: DISPENSED
Start: 2020-02-26

## (undated) RX ORDER — FENTANYL CITRATE-0.9 % NACL/PF 10 MCG/ML
PLASTIC BAG, INJECTION (ML) INTRAVENOUS
Status: DISPENSED
Start: 2021-01-08

## (undated) RX ORDER — KETAMINE HCL IN 0.9 % NACL 50 MG/5 ML
SYRINGE (ML) INTRAVENOUS
Status: DISPENSED
Start: 2020-02-26

## (undated) RX ORDER — GENTAMICIN 40 MG/ML
INJECTION, SOLUTION INTRAMUSCULAR; INTRAVENOUS
Status: DISPENSED
Start: 2021-01-08